# Patient Record
Sex: FEMALE | Race: WHITE | NOT HISPANIC OR LATINO | Employment: PART TIME | URBAN - METROPOLITAN AREA
[De-identification: names, ages, dates, MRNs, and addresses within clinical notes are randomized per-mention and may not be internally consistent; named-entity substitution may affect disease eponyms.]

---

## 2017-03-08 ENCOUNTER — ALLSCRIPTS OFFICE VISIT (OUTPATIENT)
Dept: OTHER | Facility: OTHER | Age: 72
End: 2017-03-08

## 2017-03-08 DIAGNOSIS — N18.30 CHRONIC KIDNEY DISEASE, STAGE III (MODERATE) (HCC): ICD-10-CM

## 2017-03-27 ENCOUNTER — GENERIC CONVERSION - ENCOUNTER (OUTPATIENT)
Dept: OTHER | Facility: OTHER | Age: 72
End: 2017-03-27

## 2017-09-27 ENCOUNTER — GENERIC CONVERSION - ENCOUNTER (OUTPATIENT)
Dept: OTHER | Facility: OTHER | Age: 72
End: 2017-09-27

## 2017-11-21 ENCOUNTER — ALLSCRIPTS OFFICE VISIT (OUTPATIENT)
Dept: OTHER | Facility: OTHER | Age: 72
End: 2017-11-21

## 2017-11-21 DIAGNOSIS — N17.9 ACUTE KIDNEY FAILURE (HCC): ICD-10-CM

## 2017-11-21 DIAGNOSIS — N18.30 CHRONIC KIDNEY DISEASE, STAGE III (MODERATE) (HCC): ICD-10-CM

## 2017-11-22 NOTE — PROGRESS NOTES
Assessment  1  PRATIMA (acute kidney injury) (584 9) (N17 9)   2  Stage III chronic kidney disease (585 3) (N18 3)   3  Diabetes mellitus, type 2 (250 00) (E11 9)   4  Hypertension (401 9) (I10)    Plan  PRATIMA (acute kidney injury)    · (1) BASIC METABOLIC PROFILE; Status:Active; Requested SZW:55PPX7623;    Perform:[facility]; FAJ:74YHN5988; Ordered; For:PRATIMA (acute kidney injury); Ordered By:Ramy Mir;  Hyperlipidemia, mixed    · Simvastatin 40 MG Oral Tablet   Rx By: Yuriy Marino; Dispense: 30 Days ; #:30 TAB; Refill: 10; For: Hyperlipidemia, mixed; GERRY = N; Sent To: [facility]/PHARMACY #5571; Last Updated By: Merissa Garza; 11/21/2017 2:00:20 PM  Hypertension    · Valsartan-Hydrochlorothiazide 320-25 MG Oral Tablet   Rx By: Yuriy Marino; Dispense: 30 Days ; #:30 TAB; Refill: 3;For: Hypertension; GERRY = N; Sent To: [facility]/PHARMACY #8663; Last Updated By: Merissa Garza; 11/21/2017 2:00:19 PM  Hypothyroidism    · Levothyroxine Sodium 150 MCG Oral Tablet   Rx By: Yuriy Marino; Dispense: 30 Days ; #:30 TAB; Refill: 5;For: Hypothyroidism; GERRY = N; Sent To: [facility]/PHARMACY #5659; Last Updated By: Merissa Garza; 11/21/2017 2:00:19 PM  Stage III chronic kidney disease    · (1) BASIC METABOLIC PROFILE; Status:Active; Requested KJF:96VSI3164;    Perform:[facility]; RYV:58NGV1888; Ordered; For:Stage III chronic kidney disease; Ordered By:Ramy Mir;   · (1) CBC/ PLT (NO DIFF); Status:Active; Requested HFW:56TQK3877;    Perform:[facility]; VDU:48BNJ3068; Ordered; For:Stage III chronic kidney disease; Ordered By:Ramy Mir;   · (1) PHOSPHORUS; Status:Active; Requested UVC:82ZQU1464;    Perform:[facility]; AKQ:36UYK1619; Ordered; For:Stage III chronic kidney disease; Ordered By:Ramy Mir;   · (1) PTH N-TERMINAL (INTACT); Status:Active; Requested FKI:73FCD8676;    Perform:[facility]; HXH:96YNC4347; Ordered;III chronic kidney disease; Ordered By:Ramy Mir;   · (1) URINALYSIS (will reflex a microscopy if leukocytes, occult blood, protein or nitrites arenot within normal limits); Status:Active; Requested QKW:83PGL4917;    Perform:[facility]; LISETTE:14FVN8761; Ordered; For:Stage III chronic kidney disease; Ordered By:Ramy Mir;   · (1) URINE PROTEIN CREATININE RATIO; Status:Active; Requested FRANCIS:31UUZ5495;    Perform:[facility]; NUS:06NZV4762; Ordered;III chronic kidney disease; Ordered By:Ramy Mir;   · Follow-up visit in 3 months Evaluation and Treatment  Follow-up  Status: Complete -Scheduling  Done: 16LOU0015 02:53PM   Ordered;Stage III chronic kidney disease; Ordered By: Jefe Ponce Performed:  Due: 74DCV6760; Last Updated By: Bassam Willoughby; 11/21/2017 2:53:29 PM  Unlinked    · Simvastatin 40 MG Oral Tablet   Dispense: 0 Days ; #: Sufficient Tablet; Refill: 0; GERRY = N; Record; Last Updated By: Jefe Ponce; 11/21/2017 2:01:15 PM    Discussion/Summary    Acute kidney injury: Kidney function is due to reacting I am not sure why  Advised to drink lots of liquid and I will repeat blood test in 1 month  stage 3: Will continue to monitor closely  Very well control  will see her back in 3 months but she will get blood tests in 1 month and in 3 months  The patient was counseled regarding diagnostic results,-- instructions for management,-- risk factor reductions,-- prognosis  The patient has the current Goals: Keep kidney function stable  The patent has the current Barriers: None  Patient is able to Self-Care  Possible side effects of new medications were reviewed with the patient/guardian today  Indication for Services: CKD Stage 3  CKD Teaching includes hypertension management, Avoid nephrotoxic medication, sodium restriction and fluid management  Reason For Visit  Eulalio Hein came in today for follow-up of stage III CKD      History of Present Illness  She is feeling quite well  Denies any complaint  Nothing much to happened since her last visit   No change in her medication      Review of Systems   Constitutional: No complaints of fever, no chills, no anorexia, no tiredness, no recent weight gain or weight loss  Integumentary: No complaints of skin rash  Gastrointestinal: No complains of abdominal pain, no constipation or diarrhea, no nausea or vomiting  Respiratory: No complaints of shortness of breath, no cough, no productive sputum  Cardiovascular: No complaints of orthopnea, no PND, no chest pain, no palpitations, no lower extremity edema  Musculoskeletal: No complaints of joint pain or swelling  Neurological: No complaints of headache, no lightheadedness or dizziness  Genitourinary: No dysuria, no hematuria, no nocturia, no urinary frequency, no incomplete emptying of bladder, no foamy urine  Eyes: No complaints of eyesight problems or dryness of eyes  ENT: no complaints of hearing loss, no nasal discharge  Psychiatric: Not suicidal, no sleep disturbance, no anxiety or depression, no change in personality, no emotional problems  ROS reviewed  Past Medical History    The active problems and past medical history were reviewed and updated today  Surgical History    The surgical history was reviewed and updated today  Current Meds   1  Aspirin Adult Low Dose 81 MG Oral Tablet Delayed Release; TAKE 1 TABLET DAILY; Therapy: (Recorded:22Jun2016) to Recorded   2  DilTIAZem HCl ER Beads 180 MG Oral Capsule Extended Release 24 Hour; TAKE 1 CAPSULE DAILY IN THE MORNING; Therapy: (Buffy [place]) to Recorded   3  Ferrous Sulfate 325 (65 Fe) MG Oral Tablet; TAKE 1 TABLET DAILY; Therapy: (Recorded:04May2016) to Recorded   4  Furosemide 20 MG Oral Tablet; TAKE 1 TABLET DAILY; Therapy: (Recorded:04May2016) to Recorded   5  GlyBURIDE 5 MG Oral Tablet; TAKE 2 TABLETS BY MOUTH IN THE AM,AND 1 IN THE PM; Therapy: 32MEK6803 to (Evaluate:21Upd7979)  Requested for: 14Apr2015; Last Rx:14Apr2015 Ordered   6   Klor-Con 10 10 MEQ Oral Tablet Extended Release; TAKE 1 TABLET DAILY WITH FOOD; Therapy: ([place] [facility]) to Recorded   7  Levo-T 175 MCG Oral Tablet; Therapy: 49HNH6826 to Recorded   8  Levothyroxine Sodium 150 MCG Oral Tablet; take 1 tablet every day; Therapy: 73TJM5782 to (Evaluate:27Jun2016)  Requested for: 69RCO0315; Last Rx:48Qyf0570; Status: ACTIVE - Renewal Denied Ordered   9  Metoprolol Succinate  MG Oral Tablet Extended Release 24 Hour; TAKE 2 TABLETS DAILY; Therapy: ([place]) to Recorded   10  Simvastatin 40 MG Oral Tablet; take 1 tablet by mouth every day; Therapy: 41ANY1219 to (Robin Stokeston)  Requested for: 77Yzy6934; Last  Rx:44Red0768; Status: ACTIVE - Renewal Denied Ordered   11  Simvastatin 40 MG Oral Tablet; TAKE 1 TABLET DAILY; Therapy: ([facility]) to Recorded   12  Triamcinolone Acetonide 0 5 % External Cream; APPLY 2-3 TIMES DAILY TO AFFECTED  AREA(S); Therapy: 22FPR4820 to (Last Rx:23Mar2015)  Requested for: 23Mar2015 Ordered   13  Valsartan-Hydrochlorothiazide 320-25 MG Oral Tablet; TAKE 1 TABLET DAILY; Therapy: ([facility]) to Recorded   14  Valsartan-Hydrochlorothiazide 320-25 MG Oral Tablet; take 1 tablet every day; Therapy: 79JNM5259 to (Evaluate:17Nov2015)  Requested for: 58FBA7300; Last  Rx:04Emh5276; Status: ACTIVE - Renewal Denied Ordered    The medication list was reviewed and updated today  Allergies  1  Augmentin   2  bacitracin   3  clindamycin  4  Seasonal    Vitals  Vital Signs    Recorded: 21Nov2017 02:04PM Recorded: 21Nov2017 01:54PM   Temperature  97 9 F, Oral   Heart Rate 80, Apical    Pulse Quality Normal, Apical    Respiration Quality Normal    Respiration 16    Systolic 739, LUE, Sitting    Diastolic 80, LUE, Sitting    Height  5 ft 4 in   Weight  247 lb 4 oz   BMI Calculated  42 44   BSA Calculated  2 14       Physical Exam   Constitutional: General appearance: No acute distress, well appearing and well nourished  Eyes: Anicteric sclerae      Neck: No bruit heard over either carotid  JVD:  No JVD present  Pulmonary: Respiratory effort: No increased work of breathing or signs of respiratory distress  -- Auscultation of lungs: Clear to auscultation  Cardiovascular: Auscultation of heart: Normal rate and rhythm, normal S1 and S2, without murmurs  Abdomen: Non-tender, no masses  Extremities: No cyanosis, clubbing or edema  Pulses: Dorsalis Pedis and Posterior Tibial pulses normal   Rash: No rash present  Neurologic: Non Focal     Psychiatric: Orientation to person, place, and time: Normal        Health Management  Other screening mammogram   Digital Bilateral Screening Mammogram With CAD; every 1 year; Next Due: 03CNX5789; Overdue  Screening for diabetic retinopathy   *VB - Eye Exam; every 1 year; Next Due: 40GCE6517; Overdue    Future Appointments    Date/Time Provider Specialty Site   02/26/2018 10:15 AM ARPIT Quarles   Nephrology [address]       Signatures   Electronically signed by : ARPIT Gonsalves ; Nov 21 2017  3:58PM EST                       (Author)

## 2018-01-09 NOTE — MISCELLANEOUS
Message   Recorded as Task   Date: 04/27/2016 04:55 PM, Created By: Marquis Tamez   Task Name: Call Back   Assigned To: Aiyana Messina   Regarding Patient: Mikaela Richter, Status: In Progress   Comment:    Freida Jackson - 27 Apr 2016 4:55 PM     TASK CREATED  Caller: Self; [phone] (Home); [phone] (Work)  SHARON CALLED AND SAID HER DERMATOLOGIST SAID SHE WANTS TO PRESCRIBED FEXOFENADINE BUT THAT YOU HAVE TO DECIDE WHAT DOSAGE  Ramy Mir - 27 Apr 2016 5:39 PM     TASK REASSIGNED: Previously Assigned To Adeola,Ramy  no dose adjusment   Aiyana Messina - 28 Apr 2016 8:38 AM     TASK IN PROGRESS   Aiyana Messina - 28 Apr 2016 8:39 AM     TASK EDITED  Left message for the patient to call back  Rocco Solothal had called and said her Dermatologist said she wants to prescribe fexofenadine but that you have to decide  Per Dr Jas Lucas no dose adjustment  Spoke with the patient and she is aware  Kaiden Whipple      Active Problems    1  Anemia (285 9) (D64 9)   2  Chronic kidney disease (CKD) (585 9) (N18 9)   3  Diabetes mellitus, type 2 (250 00) (E11 9)   4  Eczema (692 9) (L30 9)   5  Hyperlipidemia, mixed (272 2) (E78 2)   6  Hypertension (401 9) (I10)   7  Hypothyroidism (244 9) (E03 9)   8  Obesity (278 00) (E66 9)   9  Osteoarthritis (715 90) (M19 90)   10  Other screening mammogram (V76 12) (Z12 31)   11  Screening for diabetic retinopathy (V80 2) (Z13 5)    Current Meds   1  Adult Aspirin Low Strength TBDP Recorded   2  Allopurinol 300 MG Oral Tablet; take 1 tablet by mouth daily; Therapy: 14XQC3433 to (Chante Holt)  Requested for: 95ZBP7179; Last   Rx:78Klz8415; Status: ACTIVE - Renewal Denied Ordered   3  Diltiazem HCl ER Coated Beads 180 MG Oral Capsule Extended Release 24 Hour; take   1 capsule daily in the morning; Therapy: 03PUC6252 to (Evaluate:18Mar2016)  Requested for: 28Mar2016; Last   Rx:75Ded6833; Status: ACTIVE - Renewal Denied Ordered   4   Furosemide 40 MG Oral Tablet; TAKE 1 TABLET DAILY; Therapy: 89Ang8326 to (Evaluate:16Jan2016)  Requested for: 08Iyp4598; Last   Rx:77Xyr3192; Status: ACTIVE - Renewal Denied Ordered   5  GlyBURIDE 5 MG Oral Tablet; TAKE 2 TABLETS BY MOUTH IN THE AM,AND 1 IN THE   PM;   Therapy: 98TWS4461 to (Evaluate:10Dec2015)  Requested for: 14Apr2015; Last   Rx:14Apr2015 Ordered   6  Klor-Con 10 10 MEQ Oral Tablet Extended Release; TAKE 1 TABLET DAILY; Therapy: 01Mxq5821 to (Evaluate:16Jan2016)  Requested for: 83Lyy3517; Last   Rx:74Pun5434; Status: ACTIVE - Renewal Denied Ordered   7  Klor-Con M10 10 MEQ Oral Tablet Extended Release; TAKE 1 TABLET DAILY    Requested for: 64FBW1082; Last Rx:26Jan2015; Status: ACTIVE - Renewal Denied   Ordered   8  Levothyroxine Sodium 150 MCG Oral Tablet; take 1 tablet every day; Therapy: 21XSI7329 to (Evaluate:27Jun2016)  Requested for: 39EZU4643; Last   Rx:89Ztp6625 Ordered   9  Metoprolol Succinate  MG Oral Tablet Extended Release 24 Hour; TAKE 2   TABLETS EVERY DAY; Therapy: 84CRC3383 to (Hunter Apple)  Requested for: 28Apr2016; Last   Rx:37Qso8201; Status: ACTIVE - Renewal Denied Ordered   10  Simvastatin 40 MG Oral Tablet; take 1 tablet by mouth every day; Therapy: 34EVU4770 to (Hunter Apple)  Requested for: 99Ibi8578; Last    Rx:14Apr2015; Status: ACTIVE - Renewal Denied Ordered   11  Triamcinolone Acetonide 0 5 % External Cream; APPLY 2-3 TIMES DAILY TO    AFFECTED AREA(S); Therapy: 76IUZ2647 to (Last Rx:23Mar2015)  Requested for: 23Mar2015 Ordered   12  Valsartan-Hydrochlorothiazide 320-25 MG Oral Tablet; take 1 tablet every day; Therapy: 96ABE7000 to (Evaluate:17Nov2015)  Requested for: 96HSL2202; Last    Rx:73Rze3411; Status: ACTIVE - Renewal Denied Ordered    Allergies    1   Augmentin   2  clindamycin    Signatures   Electronically signed by : ARPIT Madera ; Apr 29 2016  7:35PM EST                       (Author)

## 2018-01-12 NOTE — PROGRESS NOTES
Assessment    1  Stage III chronic kidney disease (585 3) (N18 3)   2  Diabetes mellitus, type 2 (250 00) (E11 9)   3  Hyperkalemia (276 7) (E87 5)    Plan  Hyperkalemia    · (1) BASIC METABOLIC PROFILE; Status:Hold For - Exact Date; Requested for:Before next  appointment;    Perform:[facility]; VEF:89RYM9491; Ordered;  For:Hyperkalemia; Ordered By:Ramy Mir;   · Follow-up visit in 6 months Evaluation and Treatment  Follow-up  Status: Complete -  Scheduling  Done: 01AET0269 02:12PM   Ordered; For: Hyperkalemia; Ordered By: Ian Lees Performed:  Due: 09COX2154; Last Updated By: Rosemary Delacruz; 3/8/2017 2:12:17 PM  Stage III chronic kidney disease    · (1) BASIC METABOLIC PROFILE; Status:Active; Requested for:08Mar2017;    Perform:[facility]; PCN:98TIU4565; Ordered; For:Stage III chronic kidney disease; Ordered By:Ramy Mir;   · (1) CBC/ PLT (NO DIFF); Status:Active; Requested for:08Mar2017;    Perform:[facility]; ZSS:51BAQ0515; Ordered; For:Stage III chronic kidney disease; Ordered By:Ramy Mir;   · (1) PHOSPHORUS; Status:Active; Requested for:08Mar2017;    Perform:[facility]; VLD:47HFG5873; Ordered; For:Stage III chronic kidney disease; Ordered By:Ramy Mir;   · (1) PTH N-TERMINAL (INTACT); Status:Active; Requested for:08Mar2017;    Perform:[facility]; QHB:59WWA6158; Ordered; For:Stage III chronic kidney disease; Ordered By:Ramy Mir;   · (1) URINALYSIS (will reflex a microscopy if leukocytes, occult blood, protein or nitrites are  not within normal limits); Status:Active; Requested for:08Mar2017;    Perform:[facility]; UZY:74VSN3794; Ordered; For:Stage III chronic kidney disease; Ordered By:Ramy Mir;   · (1) URINE PROTEIN CREATININE RATIO; Status:Active; Requested for:08Mar2017;    Perform:[facility]; CRK:34OCX9234; Ordered;   For:Stage III chronic kidney disease; Ordered By:Adeola Ramy;    Discussion/Summary    I discuss blood work with her which suggest stable kidney function and potassium is high  Advised her to stop potassium which she is taking I will recheck her blood work in 2 weeks  I will see her back in 6 month unless blood work is still abnormal  I will redo blood work in 11 month  The patient has the current Goals: Keep kidney function is stable and improvement in potassium  The patent has the current Barriers:   Possible side effects of new medications were reviewed with the patient/guardian today  The patient was counseled regarding diagnostic results, instructions for management, prognosis  She has no barriers to learning  Indication for Services: CKD Stage 3  CKD Teaching includes hypertension management, Avoid nephrotoxic medication and dietician counseling  Reason For Visit  Deo Cooper him in today for follow-up of CKD stage III      History of Present Illness  She is oral doing well  Recovering from sinusitis though she claims see is getting that again  No other acute complaint      Review of Systems    Constitutional: No complaints of fever, no chills, no anorexia, no tiredness, no recent weight gain or weight loss  Integumentary: No complaints of skin rash  Respiratory: No complaints of shortness of breath, no cough, no productive sputum  Cardiovascular: No complaints of orthopnea, no PND, no chest pain, no palpitations, no lower extremity edema  Musculoskeletal: No complaints of joint pain or swelling  Neurological: No complaints of headache, no lightheadedness or dizziness  Genitourinary: No dysuria, no hematuria, no nocturia, no urinary frequency, no incomplete emptying of bladder, no foamy urine  Eyes: No complaints of eyesight problems or dryness of eyes  ENT: no complaints of hearing loss, no nasal discharge  Psychiatric: Not suicidal, no sleep disturbance, no anxiety or depression, no change in personality, no emotional problems       ROS reviewed  Past Medical History    The active problems and past medical history were reviewed and updated today  Current Meds   1  Allopurinol 300 MG Oral Tablet; take 1 tablet by mouth daily; Therapy: 86HSQ4690 to (Jefferson Pineda)  Requested for: 86CLJ7359; Last   Rx:29Cpl8772; Status: ACTIVE - Renewal Denied Ordered   2  Aspirin Adult Low Dose 81 MG Oral Tablet Delayed Release; TAKE 1 TABLET DAILY; Therapy: (Adonica Jubilee) to Recorded   3  DiltiaZEM HCl ER Beads 180 MG Oral Capsule Extended Release 24 Hour; TAKE 1   CAPSULE DAILY IN THE MORNING; Therapy: (Adonica Jubilee) to Recorded   4  DiltiaZEM HCl ER Coated Beads 180 MG Oral Capsule Extended Release 24 Hour; take   1 capsule daily in the morning; Therapy: 57JMJ6416 to (Evaluate:18Mar2016)  Requested for: 28Mar2016; Last   Rx:20Oct2015; Status: ACTIVE - Renewal Denied Ordered   5  Ferrous Sulfate 325 (65 Fe) MG Oral Tablet; TAKE 1 TABLET DAILY; Therapy: (Recorded:11Zkj1061) to Recorded   6  Furosemide 20 MG Oral Tablet; TAKE 1 TABLET DAILY; Therapy: (Recorded:04May2016) to Recorded   7  Furosemide 40 MG Oral Tablet; TAKE 1 TABLET DAILY; Therapy: 27Gjl2662 to (Evaluate:16Jan2016)  Requested for: 18Dza0650; Last   Rx:61Qrc4312; Status: ACTIVE - Renewal Denied Ordered   8  GlyBURIDE 5 MG Oral Tablet; TAKE 2 TABLETS BY MOUTH IN THE AM,AND 1 IN THE PM;   Therapy: 57OQE2771 to (Evaluate:39Lpw3453)  Requested for: 14Apr2015; Last   Rx:14Apr2015 Ordered   9  Klor-Con 10 10 MEQ Oral Tablet Extended Release; TAKE 1 TABLET DAILY WITH FOOD; Therapy: (Adonica Jubilee) to Recorded   10  Klor-Con 10 10 MEQ Oral Tablet Extended Release; TAKE 1 TABLET DAILY; Therapy: 35Itl3563 to (Evaluate:16Jan2016)  Requested for: 63Yjm5059; Last    Rx:73Lhj4607; Status: ACTIVE - Renewal Denied Ordered   11  Klor-Con M10 10 MEQ Oral Tablet Extended Release; TAKE 1 TABLET DAILY  Requested    for: 07YXG1561;  Last Rx:26Jan2015; Status: ACTIVE - Renewal Denied Ordered   12  Levothyroxine Sodium 150 MCG Oral Tablet; take 1 tablet every day; Therapy: 13KLT7540 to (Evaluate:27Jun2016)  Requested for: 47AXH7265; Last    Rx:90Ozt0250; Status: ACTIVE - Renewal Denied Ordered   13  Metoprolol Succinate  MG Oral Tablet Extended Release 24 Hour; TAKE 2    TABLETS DAILY; Therapy: (Jagdish Ro) to Recorded   14  Metoprolol Succinate  MG Oral Tablet Extended Release 24 Hour; TAKE 2    TABLETS EVERY DAY; Therapy: 06OAX5253 to (Dhaval Eldridge)  Requested for: 28Apr2016; Last    Rx:22Adk3111; Status: ACTIVE - Renewal Denied Ordered   15  Simvastatin 40 MG Oral Tablet; take 1 tablet by mouth every day; Therapy: 13BMC5581 to (Dhaval Eldridge)  Requested for: 42Ehz5079; Last    Rx:21Mec8145; Status: ACTIVE - Renewal Denied Ordered   16  Simvastatin 40 MG Oral Tablet; TAKE 1 TABLET DAILY; Therapy: (Jagdish Ro) to Recorded   17  Triamcinolone Acetonide 0 5 % External Cream; APPLY 2-3 TIMES DAILY TO AFFECTED    AREA(S); Therapy: 30FEU6822 to (Last Rx:23Mar2015)  Requested for: 23Mar2015 Ordered   18  Valsartan-Hydrochlorothiazide 320-25 MG Oral Tablet; TAKE 1 TABLET DAILY; Therapy: (Jagdish Ro) to Recorded   19  Valsartan-Hydrochlorothiazide 320-25 MG Oral Tablet; take 1 tablet every day; Therapy: 01WFD3177 to (Evaluate:17Nov2015)  Requested for: 04OEA1237; Last    Rx:28Sgi0937; Status: ACTIVE - Renewal Denied Ordered    The medication list was reviewed and updated today  Allergies    1  Augmentin   2  bacitracin   3  clindamycin    4   Seasonal    Vitals  Vital Signs    Recorded: 99STP7989 01:49PM Recorded: 20BRO7140 01:40PM   Temperature  97 6 F, Oral   Heart Rate 80, Apical    Pulse Quality Normal, Apical    Respiration Quality Normal    Respiration 16    Systolic 753, LUE, Sitting    Diastolic 60, LUE, Sitting    Height  5 ft 4 5 in   Weight  250 lb    BMI Calculated  42 25   BSA Calculated  2 16     Physical Exam    Constitutional: General appearance: Abnormal   obese  ENT: External ears and nose appear normal      Eyes: Anicteric sclerae  Neck: No bruit heard over either carotid  Pulmonary: Respiratory effort: No increased work of breathing or signs of respiratory distress  Auscultation of lungs: Clear to auscultation  Cardiovascular: Auscultation of heart: Normal rate and rhythm, normal S1 and S2, without murmurs  Abdomen: Non-tender, no masses  Extremities: No cyanosis, clubbing or edema  Neurologic: Non Focal      Psychiatric: Orientation to person, place, and time: Normal   and Mood and affect: Normal        Results/Data  Nephrology Flowsheet 18FJT2304 12:00AM Danish Florentino     Test Name Result Flag Reference   WBC 7 6     Hemoglobin 11 7     Hematocrit 34 8     Platelets 486     Sodium 143     Potassium 5 4     Chloride 105     Carbon Dioxide 21     Calcium 9 5     GLUCOSE 162     BUN 34     Serum Creatinine 162     GFR,  48     GFR, NON-AFRICAN AMERICAN 42     Phosphorus 4 3     PTH 30     Urine Protein Creatinine Ratio 121     Urinalysis Date 2/28/2017     SPECIFIC GRAVITY UA 1 013     BLOOD UA NEG     PH UA 6 0     PROTEIN UA NEG     NITRITE UA NEG     LEUKOCYTE ESTERASE UA 1+ A    WBC, Urine 6-10 A    RBC 0-2     BACTERIA FEW     Glucose, Urine NEG         Health Management  Other screening mammogram   Digital Bilateral Screening Mammogram With CAD; every 1 year; Next Due: 22LQJ6838; Overdue  Screening for diabetic retinopathy   *VB - Eye Exam; every 1 year; Next Due: 73AGS0198; Overdue    Future Appointments    Date/Time Provider Specialty Site   09/20/2017 01:30 PM [facility] Garrett   Nephrology [address]     Signatures   Electronically signed by : Lazarus Hooker, M D ; Mar  8 2017  4:38PM EST                       (Author)

## 2018-01-13 VITALS
HEART RATE: 80 BPM | BODY MASS INDEX: 41.65 KG/M2 | RESPIRATION RATE: 16 BRPM | TEMPERATURE: 97.6 F | DIASTOLIC BLOOD PRESSURE: 60 MMHG | WEIGHT: 250 LBS | HEIGHT: 65 IN | SYSTOLIC BLOOD PRESSURE: 120 MMHG

## 2018-01-14 VITALS
DIASTOLIC BLOOD PRESSURE: 80 MMHG | BODY MASS INDEX: 42.21 KG/M2 | WEIGHT: 247.25 LBS | SYSTOLIC BLOOD PRESSURE: 120 MMHG | HEIGHT: 64 IN | TEMPERATURE: 97.9 F | HEART RATE: 80 BPM | RESPIRATION RATE: 16 BRPM

## 2018-02-16 RX ORDER — TRIAMCINOLONE ACETONIDE 5 MG/G
CREAM TOPICAL 3 TIMES DAILY
COMMUNITY
Start: 2014-10-14 | End: 2018-02-26 | Stop reason: CLARIF

## 2018-02-16 RX ORDER — DILTIAZEM HYDROCHLORIDE 180 MG/1
360 CAPSULE, EXTENDED RELEASE ORAL DAILY
COMMUNITY

## 2018-02-16 RX ORDER — FUROSEMIDE 20 MG/1
1 TABLET ORAL DAILY
COMMUNITY
End: 2019-10-07 | Stop reason: ALTCHOICE

## 2018-02-16 RX ORDER — LEVOTHYROXINE SODIUM 175 UG/1
150 TABLET ORAL
COMMUNITY
Start: 2017-11-21

## 2018-02-16 RX ORDER — POTASSIUM CHLORIDE 750 MG/1
1 TABLET, FILM COATED, EXTENDED RELEASE ORAL DAILY
COMMUNITY
End: 2018-02-26 | Stop reason: CLARIF

## 2018-02-16 RX ORDER — VALSARTAN AND HYDROCHLOROTHIAZIDE 320; 25 MG/1; MG/1
1 TABLET, FILM COATED ORAL DAILY
COMMUNITY
End: 2018-10-03 | Stop reason: SDUPTHER

## 2018-02-16 RX ORDER — FERROUS SULFATE 325(65) MG
1 TABLET ORAL DAILY
COMMUNITY
End: 2018-10-03 | Stop reason: ALTCHOICE

## 2018-02-16 RX ORDER — ASPIRIN 81 MG/1
1 TABLET ORAL DAILY
COMMUNITY
End: 2019-10-07 | Stop reason: ALTCHOICE

## 2018-02-16 RX ORDER — GLYBURIDE 5 MG/1
TABLET ORAL 2 TIMES DAILY WITH MEALS
COMMUNITY
Start: 2015-03-24 | End: 2018-10-03 | Stop reason: ALTCHOICE

## 2018-02-16 RX ORDER — METOPROLOL SUCCINATE 100 MG/1
1 TABLET, EXTENDED RELEASE ORAL DAILY
COMMUNITY

## 2018-02-20 LAB
APPEARANCE UR: CLEAR
BACTERIA URNS QL MICRO: ABNORMAL
BILIRUB UR QL STRIP: NEGATIVE
BUN SERPL-MCNC: 30 MG/DL (ref 8–27)
BUN/CREAT SERPL: 26 (ref 12–28)
CALCIUM SERPL-MCNC: 9.6 MG/DL (ref 8.7–10.3)
CASTS URNS MICRO: ABNORMAL
CASTS URNS QL MICRO: PRESENT /LPF
CHLORIDE SERPL-SCNC: 100 MMOL/L (ref 96–106)
CO2 SERPL-SCNC: 23 MMOL/L (ref 18–29)
COLOR UR: YELLOW
CREAT SERPL-MCNC: 1.15 MG/DL (ref 0.57–1)
CREAT UR-MCNC: 44.3 MG/DL
EPI CELLS #/AREA URNS HPF: ABNORMAL /HPF
ERYTHROCYTE [DISTWIDTH] IN BLOOD BY AUTOMATED COUNT: 13.8 % (ref 12.3–15.4)
GLUCOSE SERPL-MCNC: 148 MG/DL (ref 65–99)
GLUCOSE UR QL: NEGATIVE
HCT VFR BLD AUTO: 32.7 % (ref 34–46.6)
HGB BLD-MCNC: 10.9 G/DL (ref 11.1–15.9)
HGB UR QL STRIP: NEGATIVE
KETONES UR QL STRIP: NEGATIVE
LEUKOCYTE ESTERASE UR QL STRIP: ABNORMAL
MCH RBC QN AUTO: 29.3 PG (ref 26.6–33)
MCHC RBC AUTO-ENTMCNC: 33.3 G/DL (ref 31.5–35.7)
MCV RBC AUTO: 88 FL (ref 79–97)
MICRO URNS: ABNORMAL
MUCOUS THREADS URNS QL MICRO: PRESENT
NITRITE UR QL STRIP: NEGATIVE
PH UR STRIP: 5.5 [PH] (ref 5–7.5)
PHOSPHATE SERPL-MCNC: 3.1 MG/DL (ref 2.5–4.5)
PLATELET # BLD AUTO: 289 X10E3/UL (ref 150–379)
POTASSIUM SERPL-SCNC: 4.9 MMOL/L (ref 3.5–5.2)
PROT UR QL STRIP: NEGATIVE
PROT UR-MCNC: 5.7 MG/DL
PROT/CREAT UR: 129 MG/G{CREAT} (ref 0–200)
PTH-INTACT SERPL-MCNC: 28 PG/ML (ref 15–65)
RBC # BLD AUTO: 3.72 X10E6/UL (ref 3.77–5.28)
RBC #/AREA URNS HPF: ABNORMAL /HPF
SL AMB EGFR AFRICAN AMERICAN: 55
SL AMB EGFR NON AFRICAN AMERICAN: 48
SODIUM SERPL-SCNC: 141 MMOL/L (ref 134–144)
SP GR UR: 1.01 (ref 1–1.03)
UROBILINOGEN UR STRIP-ACNC: 0.2 EU/DL (ref 0.2–1)
WBC # BLD AUTO: 6.9 X10E3/UL (ref 3.4–10.8)
WBC #/AREA URNS HPF: ABNORMAL /HPF

## 2018-02-26 ENCOUNTER — OFFICE VISIT (OUTPATIENT)
Dept: NEPHROLOGY | Facility: CLINIC | Age: 73
End: 2018-02-26
Payer: COMMERCIAL

## 2018-02-26 VITALS
TEMPERATURE: 98.2 F | HEART RATE: 78 BPM | BODY MASS INDEX: 38.41 KG/M2 | SYSTOLIC BLOOD PRESSURE: 130 MMHG | DIASTOLIC BLOOD PRESSURE: 70 MMHG | HEIGHT: 66 IN | WEIGHT: 239 LBS

## 2018-02-26 DIAGNOSIS — E11.22 TYPE 2 DIABETES MELLITUS WITH STAGE 3 CHRONIC KIDNEY DISEASE, WITHOUT LONG-TERM CURRENT USE OF INSULIN (HCC): ICD-10-CM

## 2018-02-26 DIAGNOSIS — N18.30 STAGE III CHRONIC KIDNEY DISEASE (HCC): ICD-10-CM

## 2018-02-26 DIAGNOSIS — N18.30 TYPE 2 DIABETES MELLITUS WITH STAGE 3 CHRONIC KIDNEY DISEASE, WITHOUT LONG-TERM CURRENT USE OF INSULIN (HCC): ICD-10-CM

## 2018-02-26 DIAGNOSIS — N18.30 CKD (CHRONIC KIDNEY DISEASE) STAGE 3, GFR 30-59 ML/MIN (HCC): Primary | ICD-10-CM

## 2018-02-26 PROBLEM — E87.5 HYPERKALEMIA: Status: ACTIVE | Noted: 2017-03-08

## 2018-02-26 PROCEDURE — 3066F NEPHROPATHY DOC TX: CPT | Performed by: INTERNAL MEDICINE

## 2018-02-26 PROCEDURE — 99213 OFFICE O/P EST LOW 20 MIN: CPT | Performed by: INTERNAL MEDICINE

## 2018-02-26 NOTE — PATIENT INSTRUCTIONS
Chronic Kidney Disease   AMBULATORY CARE:   Chronic kidney disease (CKD)  is the gradual and permanent loss of kidney function  Normally, the kidneys remove fluid, chemicals, and waste from your blood  These wastes are turned into urine by your kidneys  CKD may worsen over time and lead to kidney failure  Common symptoms include the following:   · Changes in how often you need to urinate    · Swelling in your arms, legs, or feet    · Shortness of breath    · Fatigue or weakness    · Bad or bitter taste in your mouth    · Nausea, vomiting, or loss of appetite  Seek care immediately if:   · You are confused and very drowsy  · You have a seizure  · You have shortness of breath  Contact your healthcare provider if:   · You suddenly gain or lose more weight than your healthcare provider has told you is okay  · You have itchy skin or a rash  · You urinate more or less than you normally do  · You have blood in your urine  · You have nausea and repeated vomiting  · You have fatigue or muscle weakness  · You have hiccups that will not stop  · You have questions or concerns about your condition or care  Treatment for CKD:  Medicines may be given to decrease blood pressure and get rid of extra fluid  You may also receive medicine to manage health conditions that may occur with CKD  Dialysis is a treatment to remove chemicals and waste from your blood when your kidneys can no longer do this  Surgery may be needed to create an arteriovenous fistula (AVF) in your arm or insert a catheter into your abdomen so that you can receive dialysis  A kidney transplant may be done if your CKD becomes severe  Manage CKD:   · Maintain a healthy weight  Ask your healthcare provider how much you should weigh  Ask him to help you create a weight loss plan if you are overweight  · Exercise 30 to 60 minutes a day, 4 to 7 times a week, or as directed  Ask about the best exercise plan for you   Regular exercise can help you manage CKD, high blood pressure, and diabetes  · Follow your healthcare provider's advice about what to eat and drink  He may tell you to eat food low in sodium (salt), potassium, phosphorus, or protein  You may need to see a dietitian if you need help planning meals  Ask how much liquid to drink each day and which liquids are best for you  · Limit alcohol  Ask how much alcohol is safe for you to drink  A drink of alcohol is 12 ounces of beer, 5 ounces of wine, or 1½ ounces of liquor  · Do not smoke  Nicotine and other chemicals in cigarettes and cigars can cause lung and kidney damage  Ask your healthcare provider for information if you currently smoke and need help to quit  E-cigarettes or smokeless tobacco still contain nicotine  Talk to your healthcare provider before you use these products  · Ask your healthcare provider if you need vaccines  Infections such as pneumonia, influenza, and hepatitis can be more harmful or more likely to occur in a person who has CKD  Vaccines reduce your risk of infection with these viruses  Follow up with your healthcare provider as directed:  Write down your questions so you remember to ask them during your visits  © 2017 [employer] Information is for End User's use only and may not be sold, redistributed or otherwise used for commercial purposes  All illustrations and images included in CareNotes® are the copyrighted property of [employer]  or [employer]  The above information is an  only  It is not intended as medical advice for individual conditions or treatments  Talk to your doctor, nurse or pharmacist before following any medical regimen to see if it is safe and effective for you

## 2018-02-26 NOTE — ASSESSMENT & PLAN NOTE
Kidney function is stable at this point  Advised to continue what she is doing with losing weight  Asymptomatic and progressive nature of kidney disease discussed with her with advised to avoid any nephrotoxic medicine    Hydration was also discussed with her

## 2018-02-26 NOTE — LETTER
February 26, 2018     [address]  [facility]    Patient: Aung Victoria   YOB: 1945   Date of Visit: 2/26/2018       Dear Dr [facility]: Thank you for referring Jean Pierre Sahni to me for evaluation  Below are my notes for this consultation  If you have questions, please do not hesitate to call me  I look forward to following your patient along with you  Sincerely,        Peace Gambino MD        CC: No Recipients  Peace Gambino MD  2/26/2018 11:56 AM  Sign at close encounter  Uriah Long 72 y o  female MRN: [id number]    Encounter: [id number] 2/26/2018    REASON FOR VISIT: Aung Victoria is a 72 y o  female who is here on 2/26/2018 for No chief complaint on file  Griffith Organ HPI:    Adore Castillo came for follow-up of stage III CKD  Feeling quite well  Denies any complaint  Losing some weight with diet and exercise and quite happy about it        REVIEW OF SYSTEMS:    Review of Systems   Constitutional: Negative for activity change and fatigue  HENT: Negative for congestion and ear discharge  Eyes: Negative for photophobia and pain  Respiratory: Negative for apnea and choking  Cardiovascular: Negative for chest pain and palpitations  Gastrointestinal: Negative for abdominal distention and blood in stool  Endocrine: Negative for heat intolerance and polyphagia  Genitourinary: Negative for flank pain and urgency  Musculoskeletal: Negative for neck pain and neck stiffness  Skin: Negative for color change and wound  Allergic/Immunologic: Negative for food allergies and immunocompromised state  Neurological: Negative for seizures and facial asymmetry  Hematological: Negative for adenopathy  Does not bruise/bleed easily  Psychiatric/Behavioral: Negative for self-injury and suicidal ideas           PAST MEDICAL HISTORY:  Past Medical History:   Diagnosis Date    Anemia     Chronic kidney disease     Diabetes mellitus ([facility] [address] )     Hypertension        PAST SURGICAL HISTORY:  Past Surgical History:   Procedure Laterality Date    GALLBLADDER SURGERY      HERNIA REPAIR      REPLACEMENT TOTAL KNEE      TONSILLECTOMY         SOCIAL HISTORY:  History   Alcohol Use No     History   Drug Use No     History   Smoking Status    Former Smoker   Smokeless Tobacco    Never Used       FAMILY HISTORY:  Family History   Problem Relation Age of Onset    No Known Problems Mother     Heart disease Father     No Known Problems Sister        MEDICATIONS:    Current Outpatient Prescriptions:     aspirin (ASPIRIN ADULT LOW DOSE) 81 mg EC tablet, Take 1 tablet by mouth daily, Disp: , Rfl:     diltiazem (TIAZAC) 180 MG 24 hr capsule, Take 1 capsule by mouth Daily, Disp: , Rfl:     ferrous sulfate 325 (65 Fe) mg tablet, Take 1 tablet by mouth daily, Disp: , Rfl:     furosemide (LASIX) 20 mg tablet, Take 1 tablet by mouth daily, Disp: , Rfl:     glyBURIDE (DIABETA) 5 mg tablet, Take by mouth 2 (two) times a day with meals  , Disp: , Rfl:     levothyroxine (LEVO-T) 175 mcg tablet, Take by mouth, Disp: , Rfl:     metoprolol succinate (TOPROL-XL) 100 mg 24 hr tablet, Take 2 tablets by mouth daily, Disp: , Rfl:     valsartan-hydrochlorothiazide (DIOVAN-HCT) 320-25 MG per tablet, Take 1 tablet by mouth daily, Disp: , Rfl:     PHYSICAL EXAM:  Vitals:    02/26/18 1000   BP: 130/70   BP Location: Right arm   Patient Position: Sitting   Pulse: 78   Temp: 98 2 °F (36 8 °C)   Weight: 108 kg (239 lb)   Height: 5' 6" (1 676 m)     Body mass index is 38 58 kg/m²  Physical Exam   Constitutional: She is oriented to person, place, and time  She appears well-developed  No distress  HENT:   Head: Normocephalic  Mouth/Throat: Oropharynx is clear and moist    Eyes: Conjunctivae are normal  No scleral icterus  Neck: Neck supple  No JVD present  Cardiovascular: Normal rate and normal heart sounds      Pulmonary/Chest: Effort normal  She has no wheezes  Abdominal: Soft  There is no tenderness  Musculoskeletal: Normal range of motion  She exhibits edema  Neurological: She is alert and oriented to person, place, and time  Skin: Skin is warm  No rash noted  Psychiatric: She has a normal mood and affect  Her behavior is normal        LAB RESULTS:  Results for orders placed or performed in visit on 02/19/18   Urinalysis with microscopic   Result Value Ref Range    Specific Gravity 1 012 1 005 - 1 030    Ph 5 5 5 0 - 7 5    SL AMB COLOR, URINE Yellow Yellow    Urine Appearance Clear Clear    SL AMB LEUKOCYTE ESTERASE URINE Trace (A) Negative    Protein Negative Negative/Trace    Glucose, Urine Negative Negative    SL AMB KETONE, URINE, QUAL  Negative Negative    SL AMB BLOOD, URINE Negative Negative    SL AMB BILIRUBIN, URINE Negative Negative    Urobilinogen Urine 0 2 0 2 - 1 0 EU/dL    SL AMB NITRITES URINE, QUAL  Negative Negative    Microscopic Examination See below:    Microscopic Examination   Result Value Ref Range    SL AMB WBC, URINE 6-10 (A) 0 - 5 /hpf    SL AMB RBC, URINE 0-2 0 - 2 /hpf    Epithelial Cells (non renal) 0-10 0 - 10 /hpf    Casts Present (A) None seen /lpf    Cast Type Hyaline casts N/A    Mucus Threads Present Not Estab      Bacteria, Urine None seen None seen/Few   CBC   Result Value Ref Range    SL AMB LAB WHITE BLOOD CELL COUNT 6 9 3 4 - 10 8 x10E3/uL    SL AMB LAB RED BLOOD CELLS 3 72 (L) 3 77 - 5 28 x10E6/uL    Hemoglobin 10 9 (L) 11 1 - 15 9 g/dL    Hematocrit 32 7 (L) 34 0 - 46 6 %    MCV 88 79 - 97 fL    MCH 29 3 26 6 - 33 0 pg    MCHC 33 3 31 5 - 35 7 g/dL    RDW 13 8 12 3 - 15 4 %    Platelet Count 560 588 - 379 [id number]   Basic metabolic panel   Result Value Ref Range    SL AMB GLUCOSE 148 (H) 65 - 99 mg/dL    BUN 30 (H) 8 - 27 mg/dL    Creatinine, Serum 1 15 (H) 0 57 - 1 00 mg/dL    eGFR Non  48 (L) >59    SL AMB EGFR  55 (L) >59    SL AMB BUN/CREATININE RATIO 26 12 - 28    SL AMB SODIUM 141 134 - 144 mmol/L    SL AMB POTASSIUM 4 9 3 5 - 5 2 mmol/L    SL AMB CHLORIDE 100 96 - 106 mmol/L    SL AMB CARBON DIOXIDE 23 18 - 29 mmol/L    CALCIUM 9 6 8 7 - 10 3 mg/dL   Protein / creatinine ratio, urine   Result Value Ref Range    Creatinine, Urine 44 3 Not Estab  mg/dL    SL AMB TOTAL PROTEIN, URINE 5 7 Not Estab  mg/dL    Prot/Creat Ratio, Ur 129 0 - 200   Phosphorus   Result Value Ref Range    Phosphorus, Serum 3 1 2 5 - 4 5 mg/dL   PTH, intact   Result Value Ref Range    PTH, Intact 28 15 - 65 pg/mL       ASSESSMENT and PLAN:      Stage III chronic kidney disease  Kidney function is stable at this point  Advised to continue what she is doing with losing weight  Asymptomatic and progressive nature of kidney disease discussed with her with advised to avoid any nephrotoxic medicine  Hydration was also discussed with her    Diabetes mellitus, type 2 ([facility] [address] )  Diabetes is reasonably well controlled and being monitored by primary doctor      I will see her back in 6 months  Will get blood and urine test before that visit        Portions of the record may have been created with voice recognition software  Occasional wrong word or "sound a like" substitutions may have occurred due to the inherent limitations of voice recognition software  Read the chart carefully and recognize, using context, where substitutions have occurred  If you have any questions, please contact the dictating provider

## 2018-02-26 NOTE — PROGRESS NOTES
NEPHROLOGY OFFICE FOLLOW UP  Ese Ramirez 72 y o  female MRN: [id number]    Encounter: [id number] 2/26/2018    REASON FOR VISIT: Ese Ramirez is a 72 y o  female who is here on 2/26/2018 for No chief complaint on file  Lajean Cassette HPI:    Dara Addison came for follow-up of stage III CKD  Feeling quite well  Denies any complaint  Losing some weight with diet and exercise and quite happy about it        REVIEW OF SYSTEMS:    Review of Systems   Constitutional: Negative for activity change and fatigue  HENT: Negative for congestion and ear discharge  Eyes: Negative for photophobia and pain  Respiratory: Negative for apnea and choking  Cardiovascular: Negative for chest pain and palpitations  Gastrointestinal: Negative for abdominal distention and blood in stool  Endocrine: Negative for heat intolerance and polyphagia  Genitourinary: Negative for flank pain and urgency  Musculoskeletal: Negative for neck pain and neck stiffness  Skin: Negative for color change and wound  Allergic/Immunologic: Negative for food allergies and immunocompromised state  Neurological: Negative for seizures and facial asymmetry  Hematological: Negative for adenopathy  Does not bruise/bleed easily  Psychiatric/Behavioral: Negative for self-injury and suicidal ideas           PAST MEDICAL HISTORY:  Past Medical History:   Diagnosis Date    Anemia     Chronic kidney disease     Diabetes mellitus ([address] )     Hypertension        PAST SURGICAL HISTORY:  Past Surgical History:   Procedure Laterality Date    GALLBLADDER SURGERY      HERNIA REPAIR      REPLACEMENT TOTAL KNEE      TONSILLECTOMY         SOCIAL HISTORY:  History   Alcohol Use No     History   Drug Use No     History   Smoking Status    Former Smoker   Smokeless Tobacco    Never Used       FAMILY HISTORY:  Family History   Problem Relation Age of Onset    No Known Problems Mother     Heart disease Father     No Known Problems Sister MEDICATIONS:    Current Outpatient Prescriptions:     aspirin (ASPIRIN ADULT LOW DOSE) 81 mg EC tablet, Take 1 tablet by mouth daily, Disp: , Rfl:     diltiazem (TIAZAC) 180 MG 24 hr capsule, Take 1 capsule by mouth Daily, Disp: , Rfl:     ferrous sulfate 325 (65 Fe) mg tablet, Take 1 tablet by mouth daily, Disp: , Rfl:     furosemide (LASIX) 20 mg tablet, Take 1 tablet by mouth daily, Disp: , Rfl:     glyBURIDE (DIABETA) 5 mg tablet, Take by mouth 2 (two) times a day with meals  , Disp: , Rfl:     levothyroxine (LEVO-T) 175 mcg tablet, Take by mouth, Disp: , Rfl:     metoprolol succinate (TOPROL-XL) 100 mg 24 hr tablet, Take 2 tablets by mouth daily, Disp: , Rfl:     valsartan-hydrochlorothiazide (DIOVAN-HCT) 320-25 MG per tablet, Take 1 tablet by mouth daily, Disp: , Rfl:     PHYSICAL EXAM:  Vitals:    02/26/18 1000   BP: 130/70   BP Location: Right arm   Patient Position: Sitting   Pulse: 78   Temp: 98 2 °F (36 8 °C)   Weight: 108 kg (239 lb)   Height: 5' 6" (1 676 m)     Body mass index is 38 58 kg/m²  Physical Exam   Constitutional: She is oriented to person, place, and time  She appears well-developed  No distress  HENT:   Head: Normocephalic  Mouth/Throat: Oropharynx is clear and moist    Eyes: Conjunctivae are normal  No scleral icterus  Neck: Neck supple  No JVD present  Cardiovascular: Normal rate and normal heart sounds  Pulmonary/Chest: Effort normal  She has no wheezes  Abdominal: Soft  There is no tenderness  Musculoskeletal: Normal range of motion  She exhibits edema  Neurological: She is alert and oriented to person, place, and time  Skin: Skin is warm  No rash noted  Psychiatric: She has a normal mood and affect   Her behavior is normal        LAB RESULTS:  Results for orders placed or performed in visit on 02/19/18   Urinalysis with microscopic   Result Value Ref Range    Specific Gravity 1 012 1 005 - 1 030    Ph 5 5 5 0 - 7 5    SL AMB COLOR, URINE Yellow Yellow    Urine Appearance Clear Clear    SL AMB LEUKOCYTE ESTERASE URINE Trace (A) Negative    Protein Negative Negative/Trace    Glucose, Urine Negative Negative    SL AMB KETONE, URINE, QUAL  Negative Negative    SL AMB BLOOD, URINE Negative Negative    SL AMB BILIRUBIN, URINE Negative Negative    Urobilinogen Urine 0 2 0 2 - 1 0 EU/dL    SL AMB NITRITES URINE, QUAL  Negative Negative    Microscopic Examination See below:    Microscopic Examination   Result Value Ref Range    SL AMB WBC, URINE 6-10 (A) 0 - 5 /hpf    SL AMB RBC, URINE 0-2 0 - 2 /hpf    Epithelial Cells (non renal) 0-10 0 - 10 /hpf    Casts Present (A) None seen /lpf    Cast Type Hyaline casts N/A    Mucus Threads Present Not Estab      Bacteria, Urine None seen None seen/Few   CBC   Result Value Ref Range    SL AMB LAB WHITE BLOOD CELL COUNT 6 9 3 4 - 10 8 x10E3/uL    SL AMB LAB RED BLOOD CELLS 3 72 (L) 3 77 - 5 28 x10E6/uL    Hemoglobin 10 9 (L) 11 1 - 15 9 g/dL    Hematocrit 32 7 (L) 34 0 - 46 6 %    MCV 88 79 - 97 fL    MCH 29 3 26 6 - 33 0 pg    MCHC 33 3 31 5 - 35 7 g/dL    RDW 13 8 12 3 - 15 4 %    Platelet Count 690 440 - 379 [id number]   Basic metabolic panel   Result Value Ref Range    SL AMB GLUCOSE 148 (H) 65 - 99 mg/dL    BUN 30 (H) 8 - 27 mg/dL    Creatinine, Serum 1 15 (H) 0 57 - 1 00 mg/dL    eGFR Non  48 (L) >59    SL AMB EGFR  55 (L) >59    SL AMB BUN/CREATININE RATIO 26 12 - 28    SL AMB SODIUM 141 134 - 144 mmol/L    SL AMB POTASSIUM 4 9 3 5 - 5 2 mmol/L    SL AMB CHLORIDE 100 96 - 106 mmol/L    SL AMB CARBON DIOXIDE 23 18 - 29 mmol/L    CALCIUM 9 6 8 7 - 10 3 mg/dL   Protein / creatinine ratio, urine   Result Value Ref Range    Creatinine, Urine 44 3 Not Estab  mg/dL    SL AMB TOTAL PROTEIN, URINE 5 7 Not Estab  mg/dL    Prot/Creat Ratio, Ur 129 0 - 200   Phosphorus   Result Value Ref Range    Phosphorus, Serum 3 1 2 5 - 4 5 mg/dL   PTH, intact   Result Value Ref Range    PTH, Intact 28 15 - 65 pg/mL       ASSESSMENT and PLAN:      Stage III chronic kidney disease  Kidney function is stable at this point  Advised to continue what she is doing with losing weight  Asymptomatic and progressive nature of kidney disease discussed with her with advised to avoid any nephrotoxic medicine  Hydration was also discussed with her    Diabetes mellitus, type 2 ([facility] [address] )  Diabetes is reasonably well controlled and being monitored by primary doctor      I will see her back in 6 months  Will get blood and urine test before that visit        Portions of the record may have been created with voice recognition software  Occasional wrong word or "sound a like" substitutions may have occurred due to the inherent limitations of voice recognition software  Read the chart carefully and recognize, using context, where substitutions have occurred  If you have any questions, please contact the dictating provider

## 2018-09-28 LAB
APPEARANCE UR: CLEAR
BACTERIA URNS QL MICRO: ABNORMAL
BASOPHILS # BLD AUTO: 0 X10E3/UL (ref 0–0.2)
BASOPHILS NFR BLD AUTO: 0 %
BILIRUB UR QL STRIP: NEGATIVE
BUN SERPL-MCNC: 41 MG/DL (ref 8–27)
BUN/CREAT SERPL: 28 (ref 12–28)
CALCIUM SERPL-MCNC: 9.7 MG/DL (ref 8.7–10.3)
CASTS URNS MICRO: ABNORMAL
CASTS URNS QL MICRO: PRESENT /LPF
CHLORIDE SERPL-SCNC: 100 MMOL/L (ref 96–106)
CO2 SERPL-SCNC: 23 MMOL/L (ref 20–29)
COLOR UR: YELLOW
CREAT SERPL-MCNC: 1.45 MG/DL (ref 0.57–1)
CREAT UR-MCNC: 34.9 MG/DL
EOSINOPHIL # BLD AUTO: 0.3 X10E3/UL (ref 0–0.4)
EOSINOPHIL NFR BLD AUTO: 5 %
EPI CELLS #/AREA URNS HPF: ABNORMAL /HPF
ERYTHROCYTE [DISTWIDTH] IN BLOOD BY AUTOMATED COUNT: 13.9 % (ref 12.3–15.4)
GLUCOSE SERPL-MCNC: 181 MG/DL (ref 65–99)
GLUCOSE UR QL: NEGATIVE
HCT VFR BLD AUTO: 34.6 % (ref 34–46.6)
HGB BLD-MCNC: 11.5 G/DL (ref 11.1–15.9)
HGB UR QL STRIP: NEGATIVE
IMM GRANULOCYTES # BLD: 0 X10E3/UL (ref 0–0.1)
IMM GRANULOCYTES NFR BLD: 0 %
KETONES UR QL STRIP: NEGATIVE
LEUKOCYTE ESTERASE UR QL STRIP: ABNORMAL
LYMPHOCYTES # BLD AUTO: 1.8 X10E3/UL (ref 0.7–3.1)
LYMPHOCYTES NFR BLD AUTO: 26 %
MCH RBC QN AUTO: 29.5 PG (ref 26.6–33)
MCHC RBC AUTO-ENTMCNC: 33.2 G/DL (ref 31.5–35.7)
MCV RBC AUTO: 89 FL (ref 79–97)
MICRO URNS: ABNORMAL
MONOCYTES # BLD AUTO: 0.5 X10E3/UL (ref 0.1–0.9)
MONOCYTES NFR BLD AUTO: 7 %
MUCOUS THREADS URNS QL MICRO: PRESENT
NEUTROPHILS # BLD AUTO: 4.1 X10E3/UL (ref 1.4–7)
NEUTROPHILS NFR BLD AUTO: 62 %
NITRITE UR QL STRIP: NEGATIVE
PH UR STRIP: 6 [PH] (ref 5–7.5)
PHOSPHATE SERPL-MCNC: 3.4 MG/DL (ref 2.5–4.5)
PLATELET # BLD AUTO: 239 X10E3/UL (ref 150–379)
POTASSIUM SERPL-SCNC: 4.9 MMOL/L (ref 3.5–5.2)
PROT UR QL STRIP: NEGATIVE
PROT UR-MCNC: 7.8 MG/DL
PROT/CREAT UR: 223 MG/G CREAT (ref 0–200)
PTH-INTACT SERPL-MCNC: 37 PG/ML (ref 15–65)
RBC # BLD AUTO: 3.9 X10E6/UL (ref 3.77–5.28)
RBC #/AREA URNS HPF: ABNORMAL /HPF
SL AMB EGFR AFRICAN AMERICAN: 42 ML/MIN/1.73
SL AMB EGFR NON AFRICAN AMERICAN: 36 ML/MIN/1.73
SODIUM SERPL-SCNC: 139 MMOL/L (ref 134–144)
SP GR UR: 1.01 (ref 1–1.03)
UROBILINOGEN UR STRIP-ACNC: 0.2 EU/DL (ref 0.2–1)
WBC # BLD AUTO: 6.7 X10E3/UL (ref 3.4–10.8)
WBC #/AREA URNS HPF: ABNORMAL /HPF

## 2018-10-01 LAB
BUN SERPL-MCNC: 41 MG/DL (ref 5–25)
CREAT ?TM UR-SCNC: 34.9 UMOL/L
CREAT SERPL-MCNC: 1.45 MG/DL (ref 0.6–1.3)
EXT BILIRUBIN, UA: ABNORMAL
EXT BLOOD, UA: ABNORMAL
EXT COLOR, UA: YELLOW
EXT GLUCOSE BLD: 181
EXT GLUCOSE, UA: ABNORMAL
EXT KETONES: ABNORMAL
EXT NITRITE, UA: ABNORMAL
EXT PH, UA: 6
EXT PROTEIN URINE: 7.8
EXT PROTEIN, UA: ABNORMAL
EXT SPECIFIC GRAVITY, UA: 1.01
EXT UROBILINOGEN: 0.2
EXTERNAL BACTERIA (UA): ABNORMAL
EXTERNAL CALCIUM: 9.7
EXTERNAL CASTS (UA): PRESENT
EXTERNAL CHLORIDE: 100
EXTERNAL CO2: 23
EXTERNAL EGFR: 36
EXTERNAL PHOSPHORUS: 3.4
EXTERNAL POTASSIUM: 4.9
EXTERNAL PTH: 37
EXTERNAL RBC (UA): ABNORMAL
EXTERNAL SODIUM: 139
EXTERNAL WBC (UA): ABNORMAL
HCT VFR BLD AUTO: 34.6 % (ref 34.8–46.1)
HGB BLD-MCNC: 11.5 G/DL (ref 11.5–15.4)
PLATELET # BLD AUTO: 239 THOUSANDS/UL (ref 149–390)
PROT/CREAT UR: 223 MG/G{CREAT}
WBC # BLD AUTO: 6.7 THOUSAND/UL
WBC # BLD EST: ABNORMAL 10*3/UL

## 2018-10-03 ENCOUNTER — OFFICE VISIT (OUTPATIENT)
Dept: NEPHROLOGY | Facility: CLINIC | Age: 73
End: 2018-10-03
Payer: COMMERCIAL

## 2018-10-03 VITALS
WEIGHT: 246 LBS | HEART RATE: 80 BPM | HEIGHT: 66 IN | RESPIRATION RATE: 16 BRPM | SYSTOLIC BLOOD PRESSURE: 100 MMHG | TEMPERATURE: 98 F | BODY MASS INDEX: 39.53 KG/M2 | DIASTOLIC BLOOD PRESSURE: 70 MMHG

## 2018-10-03 DIAGNOSIS — E11.22 TYPE 2 DIABETES MELLITUS WITH STAGE 3 CHRONIC KIDNEY DISEASE, WITHOUT LONG-TERM CURRENT USE OF INSULIN (HCC): ICD-10-CM

## 2018-10-03 DIAGNOSIS — N18.30 TYPE 2 DIABETES MELLITUS WITH STAGE 3 CHRONIC KIDNEY DISEASE, WITHOUT LONG-TERM CURRENT USE OF INSULIN (HCC): ICD-10-CM

## 2018-10-03 DIAGNOSIS — N18.30 STAGE III CHRONIC KIDNEY DISEASE (HCC): Primary | ICD-10-CM

## 2018-10-03 DIAGNOSIS — I10 ESSENTIAL HYPERTENSION: ICD-10-CM

## 2018-10-03 PROCEDURE — 99213 OFFICE O/P EST LOW 20 MIN: CPT | Performed by: INTERNAL MEDICINE

## 2018-10-03 RX ORDER — LOSARTAN POTASSIUM AND HYDROCHLOROTHIAZIDE 12.5; 5 MG/1; MG/1
1 TABLET ORAL DAILY
Qty: 30 TABLET | Refills: 3 | Status: SHIPPED | OUTPATIENT
Start: 2018-10-03 | End: 2019-02-14 | Stop reason: SDUPTHER

## 2018-10-03 RX ORDER — LINAGLIPTIN 5 MG/1
5 TABLET, FILM COATED ORAL DAILY
Refills: 3 | Status: ON HOLD | COMMUNITY
Start: 2018-07-12 | End: 2019-11-13 | Stop reason: ALTCHOICE

## 2018-10-03 NOTE — PATIENT INSTRUCTIONS
Chronic Kidney Disease   AMBULATORY CARE:   Chronic kidney disease (CKD)  is the gradual and permanent loss of kidney function  Normally, the kidneys remove fluid, chemicals, and waste from your blood  These wastes are turned into urine by your kidneys  CKD may worsen over time and lead to kidney failure  Common symptoms include the following:   · Changes in how often you need to urinate    · Swelling in your arms, legs, or feet    · Shortness of breath    · Fatigue or weakness    · Bad or bitter taste in your mouth    · Nausea, vomiting, or loss of appetite  Seek care immediately if:   · You are confused and very drowsy  · You have a seizure  · You have shortness of breath  Contact your healthcare provider if:   · You suddenly gain or lose more weight than your healthcare provider has told you is okay  · You have itchy skin or a rash  · You urinate more or less than you normally do  · You have blood in your urine  · You have nausea and repeated vomiting  · You have fatigue or muscle weakness  · You have hiccups that will not stop  · You have questions or concerns about your condition or care  Treatment for CKD:  Medicines may be given to decrease blood pressure and get rid of extra fluid  You may also receive medicine to manage health conditions that may occur with CKD  Dialysis is a treatment to remove chemicals and waste from your blood when your kidneys can no longer do this  Surgery may be needed to create an arteriovenous fistula (AVF) in your arm or insert a catheter into your abdomen so that you can receive dialysis  A kidney transplant may be done if your CKD becomes severe  Manage CKD:   · Maintain a healthy weight  Ask your healthcare provider how much you should weigh  Ask him to help you create a weight loss plan if you are overweight  · Exercise 30 to 60 minutes a day, 4 to 7 times a week, or as directed  Ask about the best exercise plan for you   Regular exercise can help you manage CKD, high blood pressure, and diabetes  · Follow your healthcare provider's advice about what to eat and drink  He may tell you to eat food low in sodium (salt), potassium, phosphorus, or protein  You may need to see a dietitian if you need help planning meals  Ask how much liquid to drink each day and which liquids are best for you  · Limit alcohol  Ask how much alcohol is safe for you to drink  A drink of alcohol is 12 ounces of beer, 5 ounces of wine, or 1½ ounces of liquor  · Do not smoke  Nicotine and other chemicals in cigarettes and cigars can cause lung and kidney damage  Ask your healthcare provider for information if you currently smoke and need help to quit  E-cigarettes or smokeless tobacco still contain nicotine  Talk to your healthcare provider before you use these products  · Ask your healthcare provider if you need vaccines  Infections such as pneumonia, influenza, and hepatitis can be more harmful or more likely to occur in a person who has CKD  Vaccines reduce your risk of infection with these viruses  Follow up with your healthcare provider as directed:  Write down your questions so you remember to ask them during your visits  © 2017 [employer] Information is for End User's use only and may not be sold, redistributed or otherwise used for commercial purposes  All illustrations and images included in CareNotes® are the copyrighted property of [employer]  or [employer]  The above information is an  only  It is not intended as medical advice for individual conditions or treatments  Talk to your doctor, nurse or pharmacist before following any medical regimen to see if it is safe and effective for you

## 2018-10-03 NOTE — LETTER
October 3, 2018     Shana Garza DO  [address] [place] [address]    Patient: Marycarmen Fofana   YOB: 1945   Date of Visit: 10/3/2018       Dear Dr Ginger Martínez: Thank you for referring Chapo Danette to me for evaluation  Below are my notes for this consultation  If you have questions, please do not hesitate to call me  I look forward to following your patient along with you  Sincerely,        Flavio Cm MD        CC: No Recipients  Flavio Cm MD  10/3/2018  4:39 PM  Sign at close encounter  [address] 72 y o  female MRN: [id number]    Encounter: [id number] 10/3/2018    REASON FOR VISIT: Marycarmen Fofana is a 72 y o  female who is here on 10/3/2018 for Follow-up and CKD III       HPI:    Jes Ramon came in today for follow-up of CKD stage 3  She is overall doing well  Denies any complaint no chest pain no palpitation no shortness of breath no breathing problem still taking same medication        REVIEW OF SYSTEMS:    Review of Systems   Constitutional: Negative for activity change, chills, fatigue and unexpected weight change  HENT: Negative for congestion, ear discharge, sinus pain and sinus pressure  Eyes: Negative for photophobia and pain  Respiratory: Negative for apnea, cough, choking, chest tightness and shortness of breath  Cardiovascular: Negative for chest pain, palpitations and leg swelling  Gastrointestinal: Negative for abdominal distention, abdominal pain, anal bleeding, blood in stool, constipation and diarrhea  Endocrine: Negative for heat intolerance, polyphagia and polyuria  Genitourinary: Negative for dysuria, flank pain, genital sores and urgency  Musculoskeletal: Positive for arthralgias and back pain  Negative for neck pain and neck stiffness  Skin: Negative for color change and wound  Allergic/Immunologic: Negative for food allergies and immunocompromised state     Neurological: Negative for seizures, facial asymmetry and weakness  Hematological: Negative for adenopathy  Does not bruise/bleed easily  Psychiatric/Behavioral: Negative for self-injury and suicidal ideas  PAST MEDICAL HISTORY:  Past Medical History:   Diagnosis Date    Anemia     Chronic kidney disease     Diabetes mellitus ([address] )     Hypertension        PAST SURGICAL HISTORY:  Past Surgical History:   Procedure Laterality Date    CATARACT EXTRACTION, BILATERAL      GALLBLADDER SURGERY      HERNIA REPAIR      REPLACEMENT TOTAL KNEE      TONSILLECTOMY         SOCIAL HISTORY:  History   Alcohol Use No     History   Drug Use No     History   Smoking Status    Former Smoker   Smokeless Tobacco    Never Used       FAMILY HISTORY:  Family History   Problem Relation Age of Onset    No Known Problems Mother     Heart disease Father     No Known Problems Sister        MEDICATIONS:    Current Outpatient Prescriptions:     aspirin (ASPIRIN ADULT LOW DOSE) 81 mg EC tablet, Take 1 tablet by mouth daily, Disp: , Rfl:     diltiazem (TIAZAC) 180 MG 24 hr capsule, Take 1 capsule by mouth Daily, Disp: , Rfl:     furosemide (LASIX) 20 mg tablet, Take 1 tablet by mouth daily, Disp: , Rfl:     levothyroxine (LEVO-T) 175 mcg tablet, Take by mouth, Disp: , Rfl:     losartan-hydrochlorothiazide (HYZAAR) 50-12 5 mg per tablet, Take 1 tablet by mouth daily, Disp: 30 tablet, Rfl: 3    metoprolol succinate (TOPROL-XL) 100 mg 24 hr tablet, Take 2 tablets by mouth daily, Disp: , Rfl:     TRADJENTA 5 MG TABS, Take 5 mg by mouth daily, Disp: , Rfl: 3    PHYSICAL EXAM:  Vitals:    10/03/18 1509   BP: 100/70   BP Location: Right arm   Patient Position: Sitting   Pulse: 80   Resp: 16   Temp: 98 °F (36 7 °C)   TempSrc: Tympanic   Weight: 112 kg (246 lb)   Height: 5' 6" (1 676 m)     Body mass index is 39 71 kg/m²  Physical Exam   Constitutional: She is oriented to person, place, and time  She appears well-developed  No distress     HENT: Head: Normocephalic and atraumatic  Mouth/Throat: Oropharynx is clear and moist    Eyes: Pupils are equal, round, and reactive to light  Conjunctivae and EOM are normal  No scleral icterus  Neck: Normal range of motion  Neck supple  No JVD present  Cardiovascular: Normal rate, regular rhythm and normal heart sounds  No murmur heard  Pulmonary/Chest: Effort normal and breath sounds normal  No respiratory distress  She has no wheezes  She has no rales  Abdominal: Soft  Bowel sounds are normal  There is no tenderness  Musculoskeletal: Normal range of motion  She exhibits edema  Neurological: She is alert and oriented to person, place, and time  Skin: Skin is warm  No rash noted  Psychiatric: She has a normal mood and affect   Her behavior is normal        LAB RESULTS:  Results for orders placed or performed in visit on 10/01/18   CBC   Result Value Ref Range    WBC 6 70 Thousand/uL    Hemoglobin 11 5 11 5 - 15 4 g/dL    Hematocrit 34 6 (A) 34 8 - 46 1 %    Platelets 893 127 - 747 Thousands/uL   Urinalysis with microscopic   Result Value Ref Range    EXTERNAL COLOR,UA yellow     EXT Spec Grav, UA (Ref:1 003-1 030) 1 013     EXT Glucose, UA (Ref: Negative) neg     Ketones, UA (Ref: Negative) neg     Blood, UA neg     Nitrite, UA (Ref: Negative) neg      Leukocytes, UA (Ref: Negative) 1+     EXT pH, UA (Ref: 4 5-8 0) 6 0     EXT Protein, UA (Ref: Negative) neg     EXT Bilirubin, UA (Ref: Negative) neg     Urobilinogen, UA (Ref: 0 2- 1 0) 0 2     RBC, UA 0-2     EXTERNAL WBC, UA 6-10     EXTERNAL BACTERIA, UA few     EXTERNAL CASTS, UA present    Basic metabolic panel   Result Value Ref Range    SODIUM 139     POTASSIUM 4 9     CHLORIDE 100     CO2 23     BUN 41 (A) 5 - 25 mg/dL    Creatinine 1 45 (A) 0 60 - 1 30 mg/dL    Glucose 181     EXTERNAL CALCIUM 9 7     GFR MDRD Af Amer 42 ml/min/1 73sq m    EXTERNAL EGFR 36    Protein / creatinine ratio, urine   Result Value Ref Range    PROTEIN UA 7 8 EXT Creatinine Urine 34 9     EXTERNAL Ur Prot/Creat Ratio 223    Phosphorus   Result Value Ref Range    EXTERNAL PHOSPHORUS 3 4    PTH, intact   Result Value Ref Range    EXTERNAL PTH 37        ASSESSMENT and PLAN:      Stage III chronic kidney disease  Kidney function is deteriorating  Blood pressure is running low  I will reduce dose of valsartan from 320 actually I will change medicine to losartan 50 of leg 12 5 once a day  Advised to monitor blood pressure at home I will repeat blood test in 2 months and I will see her back in 3 months    Hypertension  Blood pressure is actually low so I will reduce the dose of medication    Diabetes mellitus, type 2 (HCC)  No results found for: HGBA1C    No results for input(s): POCGLU in the last 72 hours  Blood Sugar Average: Last 72 hrs:    Do not have any recent hemoglobin A1c  Advised to monitor glucose at home  Discussed with her about importance of diabetic control and kidney disease      I will see her back in 3 months        Portions of the record may have been created with voice recognition software  Occasional wrong word or "sound a like" substitutions may have occurred due to the inherent limitations of voice recognition software  Read the chart carefully and recognize, using context, where substitutions have occurred  If you have any questions, please contact the dictating provider

## 2018-10-03 NOTE — ASSESSMENT & PLAN NOTE
No results found for: HGBA1C    No results for input(s): POCGLU in the last 72 hours  Blood Sugar Average: Last 72 hrs:    Do not have any recent hemoglobin A1c  Advised to monitor glucose at home    Discussed with her about importance of diabetic control and kidney disease

## 2018-10-03 NOTE — PROGRESS NOTES
NEPHROLOGY OFFICE FOLLOW UP  Jordon Maurice 72 y o  female MRN: [id number]    Encounter: [id number] 10/3/2018    REASON FOR VISIT: Jordon Maurice is a 72 y o  female who is here on 10/3/2018 for Follow-up and CKD III       HPI:    Valentin Notice came in today for follow-up of CKD stage 3  She is overall doing well  Denies any complaint no chest pain no palpitation no shortness of breath no breathing problem still taking same medication        REVIEW OF SYSTEMS:    Review of Systems   Constitutional: Negative for activity change, chills, fatigue and unexpected weight change  HENT: Negative for congestion, ear discharge, sinus pain and sinus pressure  Eyes: Negative for photophobia and pain  Respiratory: Negative for apnea, cough, choking, chest tightness and shortness of breath  Cardiovascular: Negative for chest pain, palpitations and leg swelling  Gastrointestinal: Negative for abdominal distention, abdominal pain, anal bleeding, blood in stool, constipation and diarrhea  Endocrine: Negative for heat intolerance, polyphagia and polyuria  Genitourinary: Negative for dysuria, flank pain, genital sores and urgency  Musculoskeletal: Positive for arthralgias and back pain  Negative for neck pain and neck stiffness  Skin: Negative for color change and wound  Allergic/Immunologic: Negative for food allergies and immunocompromised state  Neurological: Negative for seizures, facial asymmetry and weakness  Hematological: Negative for adenopathy  Does not bruise/bleed easily  Psychiatric/Behavioral: Negative for self-injury and suicidal ideas           PAST MEDICAL HISTORY:  Past Medical History:   Diagnosis Date    Anemia     Chronic kidney disease     Diabetes mellitus ([address] )     Hypertension        PAST SURGICAL HISTORY:  Past Surgical History:   Procedure Laterality Date    CATARACT EXTRACTION, BILATERAL      GALLBLADDER SURGERY      HERNIA REPAIR      REPLACEMENT TOTAL KNEE      TONSILLECTOMY         SOCIAL HISTORY:  History   Alcohol Use No     History   Drug Use No     History   Smoking Status    Former Smoker   Smokeless Tobacco    Never Used       FAMILY HISTORY:  Family History   Problem Relation Age of Onset    No Known Problems Mother     Heart disease Father     No Known Problems Sister        MEDICATIONS:    Current Outpatient Prescriptions:     aspirin (ASPIRIN ADULT LOW DOSE) 81 mg EC tablet, Take 1 tablet by mouth daily, Disp: , Rfl:     diltiazem (TIAZAC) 180 MG 24 hr capsule, Take 1 capsule by mouth Daily, Disp: , Rfl:     furosemide (LASIX) 20 mg tablet, Take 1 tablet by mouth daily, Disp: , Rfl:     levothyroxine (LEVO-T) 175 mcg tablet, Take by mouth, Disp: , Rfl:     losartan-hydrochlorothiazide (HYZAAR) 50-12 5 mg per tablet, Take 1 tablet by mouth daily, Disp: 30 tablet, Rfl: 3    metoprolol succinate (TOPROL-XL) 100 mg 24 hr tablet, Take 2 tablets by mouth daily, Disp: , Rfl:     TRADJENTA 5 MG TABS, Take 5 mg by mouth daily, Disp: , Rfl: 3    PHYSICAL EXAM:  Vitals:    10/03/18 1509   BP: 100/70   BP Location: Right arm   Patient Position: Sitting   Pulse: 80   Resp: 16   Temp: 98 °F (36 7 °C)   TempSrc: Tympanic   Weight: 112 kg (246 lb)   Height: 5' 6" (1 676 m)     Body mass index is 39 71 kg/m²  Physical Exam   Constitutional: She is oriented to person, place, and time  She appears well-developed  No distress  HENT:   Head: Normocephalic and atraumatic  Mouth/Throat: Oropharynx is clear and moist    Eyes: Pupils are equal, round, and reactive to light  Conjunctivae and EOM are normal  No scleral icterus  Neck: Normal range of motion  Neck supple  No JVD present  Cardiovascular: Normal rate, regular rhythm and normal heart sounds  No murmur heard  Pulmonary/Chest: Effort normal and breath sounds normal  No respiratory distress  She has no wheezes  She has no rales  Abdominal: Soft  Bowel sounds are normal  There is no tenderness  Musculoskeletal: Normal range of motion  She exhibits edema  Neurological: She is alert and oriented to person, place, and time  Skin: Skin is warm  No rash noted  Psychiatric: She has a normal mood and affect  Her behavior is normal        LAB RESULTS:  Results for orders placed or performed in visit on 10/01/18   CBC   Result Value Ref Range    WBC 6 70 Thousand/uL    Hemoglobin 11 5 11 5 - 15 4 g/dL    Hematocrit 34 6 (A) 34 8 - 46 1 %    Platelets 548 691 - 805 Thousands/uL   Urinalysis with microscopic   Result Value Ref Range    EXTERNAL COLOR,UA yellow     EXT Spec Grav, UA (Ref:1 003-1 030) 1 013     EXT Glucose, UA (Ref: Negative) neg     Ketones, UA (Ref: Negative) neg     Blood, UA neg     Nitrite, UA (Ref: Negative) neg      Leukocytes, UA (Ref: Negative) 1+     EXT pH, UA (Ref: 4 5-8 0) 6 0     EXT Protein, UA (Ref: Negative) neg     EXT Bilirubin, UA (Ref: Negative) neg     Urobilinogen, UA (Ref: 0 2- 1 0) 0 2     RBC, UA 0-2     EXTERNAL WBC, UA 6-10     EXTERNAL BACTERIA, UA few     EXTERNAL CASTS, UA present    Basic metabolic panel   Result Value Ref Range    SODIUM 139     POTASSIUM 4 9     CHLORIDE 100     CO2 23     BUN 41 (A) 5 - 25 mg/dL    Creatinine 1 45 (A) 0 60 - 1 30 mg/dL    Glucose 181     EXTERNAL CALCIUM 9 7     GFR MDRD Af Amer 42 ml/min/1 73sq m    EXTERNAL EGFR 36    Protein / creatinine ratio, urine   Result Value Ref Range    PROTEIN UA 7 8     EXT Creatinine Urine 34 9     EXTERNAL Ur Prot/Creat Ratio 223    Phosphorus   Result Value Ref Range    EXTERNAL PHOSPHORUS 3 4    PTH, intact   Result Value Ref Range    EXTERNAL PTH 37        ASSESSMENT and PLAN:      Stage III chronic kidney disease  Kidney function is deteriorating  Blood pressure is running low  I will reduce dose of valsartan from 320 actually I will change medicine to losartan 50 of leg 12 5 once a day    Advised to monitor blood pressure at home I will repeat blood test in 2 months and I will see her back in 3 months    Hypertension  Blood pressure is actually low so I will reduce the dose of medication    Diabetes mellitus, type 2 (HCC)  No results found for: HGBA1C    No results for input(s): POCGLU in the last 72 hours  Blood Sugar Average: Last 72 hrs:    Do not have any recent hemoglobin A1c  Advised to monitor glucose at home  Discussed with her about importance of diabetic control and kidney disease      I will see her back in 3 months        Portions of the record may have been created with voice recognition software  Occasional wrong word or "sound a like" substitutions may have occurred due to the inherent limitations of voice recognition software  Read the chart carefully and recognize, using context, where substitutions have occurred  If you have any questions, please contact the dictating provider

## 2018-10-03 NOTE — ASSESSMENT & PLAN NOTE
Kidney function is deteriorating  Blood pressure is running low  I will reduce dose of valsartan from 320 actually I will change medicine to losartan 50 of leg 12 5 once a day    Advised to monitor blood pressure at home I will repeat blood test in 2 months and I will see her back in 3 months

## 2019-02-05 ENCOUNTER — TELEPHONE (OUTPATIENT)
Dept: NEPHROLOGY | Facility: CLINIC | Age: 74
End: 2019-02-05

## 2019-02-05 NOTE — TELEPHONE ENCOUNTER
A message has been left asking pt to contact the office to inquire if any blood work was completed for an upcoming appointment with Dr Ayanna Stovall

## 2019-02-14 DIAGNOSIS — N18.30 STAGE III CHRONIC KIDNEY DISEASE (HCC): ICD-10-CM

## 2019-02-14 RX ORDER — LOSARTAN POTASSIUM AND HYDROCHLOROTHIAZIDE 12.5; 5 MG/1; MG/1
1 TABLET ORAL DAILY
Qty: 30 TABLET | Refills: 3 | Status: SHIPPED | OUTPATIENT
Start: 2019-02-14 | End: 2019-06-20 | Stop reason: SDUPTHER

## 2019-03-27 LAB
BASOPHILS # BLD AUTO: 49 CELLS/UL (ref 0–200)
BASOPHILS NFR BLD AUTO: 0.6 %
BUN SERPL-MCNC: 50 MG/DL (ref 7–25)
BUN/CREAT SERPL: 33 (CALC) (ref 6–22)
CALCIUM SERPL-MCNC: 9.2 MG/DL (ref 8.6–10.4)
CHLORIDE SERPL-SCNC: 97 MMOL/L (ref 98–110)
CO2 SERPL-SCNC: 30 MMOL/L (ref 20–32)
CREAT SERPL-MCNC: 1.53 MG/DL (ref 0.6–0.93)
EOSINOPHIL # BLD AUTO: 8 CELLS/UL (ref 15–500)
EOSINOPHIL NFR BLD AUTO: 0.1 %
ERYTHROCYTE [DISTWIDTH] IN BLOOD BY AUTOMATED COUNT: 13.1 % (ref 11–15)
GLUCOSE SERPL-MCNC: 222 MG/DL (ref 65–99)
HCT VFR BLD AUTO: 34.9 % (ref 35–45)
HGB BLD-MCNC: 11.6 G/DL (ref 11.7–15.5)
LYMPHOCYTES # BLD AUTO: 1583 CELLS/UL (ref 850–3900)
LYMPHOCYTES NFR BLD AUTO: 19.3 %
MCH RBC QN AUTO: 29.4 PG (ref 27–33)
MCHC RBC AUTO-ENTMCNC: 33.2 G/DL (ref 32–36)
MCV RBC AUTO: 88.4 FL (ref 80–100)
MONOCYTES # BLD AUTO: 689 CELLS/UL (ref 200–950)
MONOCYTES NFR BLD AUTO: 8.4 %
NEUTROPHILS # BLD AUTO: 5871 CELLS/UL (ref 1500–7800)
NEUTROPHILS NFR BLD AUTO: 71.6 %
PLATELET # BLD AUTO: 247 THOUSAND/UL (ref 140–400)
PMV BLD REES-ECKER: 10.3 FL (ref 7.5–12.5)
POTASSIUM SERPL-SCNC: 4.4 MMOL/L (ref 3.5–5.3)
RBC # BLD AUTO: 3.95 MILLION/UL (ref 3.8–5.1)
SL AMB EGFR AFRICAN AMERICAN: 39 ML/MIN/1.73M2
SL AMB EGFR NON AFRICAN AMERICAN: 34 ML/MIN/1.73M2
SODIUM SERPL-SCNC: 136 MMOL/L (ref 135–146)
WBC # BLD AUTO: 8.2 THOUSAND/UL (ref 3.8–10.8)

## 2019-03-29 ENCOUNTER — TELEPHONE (OUTPATIENT)
Dept: NEPHROLOGY | Facility: CLINIC | Age: 74
End: 2019-03-29

## 2019-04-01 ENCOUNTER — OFFICE VISIT (OUTPATIENT)
Dept: NEPHROLOGY | Facility: CLINIC | Age: 74
End: 2019-04-01
Payer: COMMERCIAL

## 2019-04-01 VITALS
RESPIRATION RATE: 16 BRPM | SYSTOLIC BLOOD PRESSURE: 130 MMHG | HEART RATE: 80 BPM | DIASTOLIC BLOOD PRESSURE: 70 MMHG | BODY MASS INDEX: 41.69 KG/M2 | HEIGHT: 65 IN | WEIGHT: 250.2 LBS | TEMPERATURE: 97.1 F

## 2019-04-01 DIAGNOSIS — I10 BENIGN ESSENTIAL HYPERTENSION: ICD-10-CM

## 2019-04-01 DIAGNOSIS — N18.30 STAGE III CHRONIC KIDNEY DISEASE (HCC): Primary | ICD-10-CM

## 2019-04-01 DIAGNOSIS — I48.91 ATRIAL FIBRILLATION, UNSPECIFIED TYPE (HCC): ICD-10-CM

## 2019-04-01 DIAGNOSIS — N18.30 TYPE 2 DIABETES MELLITUS WITH STAGE 3 CHRONIC KIDNEY DISEASE, WITHOUT LONG-TERM CURRENT USE OF INSULIN (HCC): ICD-10-CM

## 2019-04-01 DIAGNOSIS — E11.22 TYPE 2 DIABETES MELLITUS WITH STAGE 3 CHRONIC KIDNEY DISEASE, WITHOUT LONG-TERM CURRENT USE OF INSULIN (HCC): ICD-10-CM

## 2019-04-01 PROCEDURE — 99213 OFFICE O/P EST LOW 20 MIN: CPT | Performed by: INTERNAL MEDICINE

## 2019-04-01 PROCEDURE — 3066F NEPHROPATHY DOC TX: CPT | Performed by: INTERNAL MEDICINE

## 2019-04-01 RX ORDER — ERGOCALCIFEROL (VITAMIN D2) 50 MCG
2000 CAPSULE ORAL DAILY
COMMUNITY

## 2019-06-20 DIAGNOSIS — N18.30 STAGE III CHRONIC KIDNEY DISEASE (HCC): ICD-10-CM

## 2019-06-20 RX ORDER — LOSARTAN POTASSIUM AND HYDROCHLOROTHIAZIDE 12.5; 5 MG/1; MG/1
1 TABLET ORAL DAILY
Qty: 30 TABLET | Refills: 3 | Status: SHIPPED | OUTPATIENT
Start: 2019-06-20 | End: 2019-10-07 | Stop reason: ALTCHOICE

## 2019-09-26 ENCOUNTER — TRANSCRIBE ORDERS (OUTPATIENT)
Dept: ADMINISTRATIVE | Facility: HOSPITAL | Age: 74
End: 2019-09-26

## 2019-09-26 ENCOUNTER — APPOINTMENT (OUTPATIENT)
Dept: LAB | Facility: HOSPITAL | Age: 74
End: 2019-09-26
Attending: INTERNAL MEDICINE
Payer: COMMERCIAL

## 2019-09-26 DIAGNOSIS — E11.9 DIABETES MELLITUS WITHOUT COMPLICATION (HCC): ICD-10-CM

## 2019-09-26 DIAGNOSIS — R73.9 HYPERGLYCEMIA: ICD-10-CM

## 2019-09-26 DIAGNOSIS — E03.8 TSH DEFICIENCY: ICD-10-CM

## 2019-09-26 DIAGNOSIS — R53.83 TIREDNESS: Primary | ICD-10-CM

## 2019-09-26 DIAGNOSIS — N18.30 STAGE III CHRONIC KIDNEY DISEASE (HCC): ICD-10-CM

## 2019-09-26 DIAGNOSIS — E04.9 ENLARGEMENT OF THYROID: ICD-10-CM

## 2019-09-26 DIAGNOSIS — R53.83 TIREDNESS: ICD-10-CM

## 2019-09-26 LAB
ALBUMIN SERPL BCP-MCNC: 3.3 G/DL (ref 3.5–5)
ALP SERPL-CCNC: 70 U/L (ref 46–116)
ALT SERPL W P-5'-P-CCNC: 12 U/L (ref 12–78)
ANION GAP SERPL CALCULATED.3IONS-SCNC: 11 MMOL/L (ref 4–13)
AST SERPL W P-5'-P-CCNC: 15 U/L (ref 5–45)
BACTERIA UR QL AUTO: ABNORMAL /HPF
BASOPHILS # BLD AUTO: 0.06 THOUSANDS/ΜL (ref 0–0.1)
BASOPHILS NFR BLD AUTO: 1 % (ref 0–1)
BILIRUB SERPL-MCNC: 1.5 MG/DL (ref 0.2–1)
BILIRUB UR QL STRIP: NEGATIVE
BUN SERPL-MCNC: 14 MG/DL (ref 5–25)
CALCIUM SERPL-MCNC: 9.6 MG/DL (ref 8.3–10.1)
CHLORIDE SERPL-SCNC: 100 MMOL/L (ref 100–108)
CLARITY UR: CLEAR
CO2 SERPL-SCNC: 28 MMOL/L (ref 21–32)
COLOR UR: YELLOW
CREAT SERPL-MCNC: 1.21 MG/DL (ref 0.6–1.3)
CREAT UR-MCNC: 113 MG/DL
EOSINOPHIL # BLD AUTO: 0.55 THOUSAND/ΜL (ref 0–0.61)
EOSINOPHIL NFR BLD AUTO: 7 % (ref 0–6)
ERYTHROCYTE [DISTWIDTH] IN BLOOD BY AUTOMATED COUNT: 13.8 % (ref 11.6–15.1)
EST. AVERAGE GLUCOSE BLD GHB EST-MCNC: 217 MG/DL
GFR SERPL CREATININE-BSD FRML MDRD: 45 ML/MIN/1.73SQ M
GLUCOSE P FAST SERPL-MCNC: 250 MG/DL (ref 65–99)
GLUCOSE UR STRIP-MCNC: NEGATIVE MG/DL
HBA1C MFR BLD: 9.2 % (ref 4.2–6.3)
HCT VFR BLD AUTO: 39.1 % (ref 34.8–46.1)
HGB BLD-MCNC: 12.2 G/DL (ref 11.5–15.4)
HGB UR QL STRIP.AUTO: NEGATIVE
HYALINE CASTS #/AREA URNS LPF: ABNORMAL /LPF
IMM GRANULOCYTES # BLD AUTO: 0.04 THOUSAND/UL (ref 0–0.2)
IMM GRANULOCYTES NFR BLD AUTO: 1 % (ref 0–2)
KETONES UR STRIP-MCNC: NEGATIVE MG/DL
LEUKOCYTE ESTERASE UR QL STRIP: ABNORMAL
LYMPHOCYTES # BLD AUTO: 1.75 THOUSANDS/ΜL (ref 0.6–4.47)
LYMPHOCYTES NFR BLD AUTO: 22 % (ref 14–44)
MCH RBC QN AUTO: 27.7 PG (ref 26.8–34.3)
MCHC RBC AUTO-ENTMCNC: 31.2 G/DL (ref 31.4–37.4)
MCV RBC AUTO: 89 FL (ref 82–98)
MICROALBUMIN UR-MCNC: 162 MG/L (ref 0–20)
MICROALBUMIN/CREAT 24H UR: 143 MG/G CREATININE (ref 0–30)
MONOCYTES # BLD AUTO: 0.63 THOUSAND/ΜL (ref 0.17–1.22)
MONOCYTES NFR BLD AUTO: 8 % (ref 4–12)
NEUTROPHILS # BLD AUTO: 5.02 THOUSANDS/ΜL (ref 1.85–7.62)
NEUTS SEG NFR BLD AUTO: 61 % (ref 43–75)
NITRITE UR QL STRIP: NEGATIVE
NON-SQ EPI CELLS URNS QL MICRO: ABNORMAL /HPF
NRBC BLD AUTO-RTO: 0 /100 WBCS
PH UR STRIP.AUTO: 6 [PH]
PHOSPHATE SERPL-MCNC: 3.8 MG/DL (ref 2.3–4.1)
PLATELET # BLD AUTO: 303 THOUSANDS/UL (ref 149–390)
PMV BLD AUTO: 10.7 FL (ref 8.9–12.7)
POTASSIUM SERPL-SCNC: 4.2 MMOL/L (ref 3.5–5.3)
PROT SERPL-MCNC: 7.1 G/DL (ref 6.4–8.2)
PROT UR STRIP-MCNC: ABNORMAL MG/DL
PTH-INTACT SERPL-MCNC: 26.7 PG/ML (ref 18.4–80.1)
RBC # BLD AUTO: 4.4 MILLION/UL (ref 3.81–5.12)
RBC #/AREA URNS AUTO: ABNORMAL /HPF
SODIUM SERPL-SCNC: 139 MMOL/L (ref 136–145)
SP GR UR STRIP.AUTO: 1.01 (ref 1–1.03)
T3FREE SERPL-MCNC: 2.32 PG/ML (ref 2.3–4.2)
T4 FREE SERPL-MCNC: 1.83 NG/DL (ref 0.76–1.46)
TSH SERPL DL<=0.05 MIU/L-ACNC: 0.62 UIU/ML (ref 0.36–3.74)
UROBILINOGEN UR QL STRIP.AUTO: 0.2 E.U./DL
WBC # BLD AUTO: 8.05 THOUSAND/UL (ref 4.31–10.16)
WBC #/AREA URNS AUTO: ABNORMAL /HPF

## 2019-09-26 PROCEDURE — 81001 URINALYSIS AUTO W/SCOPE: CPT

## 2019-09-26 PROCEDURE — 83970 ASSAY OF PARATHORMONE: CPT

## 2019-09-26 PROCEDURE — 80053 COMPREHEN METABOLIC PANEL: CPT

## 2019-09-26 PROCEDURE — 84100 ASSAY OF PHOSPHORUS: CPT

## 2019-09-26 PROCEDURE — 82043 UR ALBUMIN QUANTITATIVE: CPT | Performed by: GENERAL PRACTICE

## 2019-09-26 PROCEDURE — 84481 FREE ASSAY (FT-3): CPT

## 2019-09-26 PROCEDURE — 82570 ASSAY OF URINE CREATININE: CPT | Performed by: GENERAL PRACTICE

## 2019-09-26 PROCEDURE — 84439 ASSAY OF FREE THYROXINE: CPT

## 2019-09-26 PROCEDURE — 84443 ASSAY THYROID STIM HORMONE: CPT

## 2019-09-26 PROCEDURE — 85025 COMPLETE CBC W/AUTO DIFF WBC: CPT

## 2019-09-26 PROCEDURE — 83036 HEMOGLOBIN GLYCOSYLATED A1C: CPT

## 2019-09-26 PROCEDURE — 36415 COLL VENOUS BLD VENIPUNCTURE: CPT

## 2019-09-28 ENCOUNTER — APPOINTMENT (OUTPATIENT)
Dept: LAB | Facility: HOSPITAL | Age: 74
End: 2019-09-28
Attending: INTERNAL MEDICINE
Payer: COMMERCIAL

## 2019-09-28 PROCEDURE — 82570 ASSAY OF URINE CREATININE: CPT

## 2019-09-28 PROCEDURE — 84156 ASSAY OF PROTEIN URINE: CPT

## 2019-09-29 LAB
CREAT UR-MCNC: 94.5 MG/DL
PROT UR-MCNC: 27 MG/DL
PROT/CREAT UR: 0.29 MG/G{CREAT} (ref 0–0.1)

## 2019-10-07 ENCOUNTER — OFFICE VISIT (OUTPATIENT)
Dept: NEPHROLOGY | Facility: CLINIC | Age: 74
End: 2019-10-07
Payer: COMMERCIAL

## 2019-10-07 VITALS
WEIGHT: 237.4 LBS | HEIGHT: 65 IN | SYSTOLIC BLOOD PRESSURE: 120 MMHG | DIASTOLIC BLOOD PRESSURE: 80 MMHG | BODY MASS INDEX: 39.55 KG/M2 | HEART RATE: 68 BPM | RESPIRATION RATE: 16 BRPM | TEMPERATURE: 97.9 F

## 2019-10-07 DIAGNOSIS — N18.30 TYPE 2 DIABETES MELLITUS WITH STAGE 3 CHRONIC KIDNEY DISEASE, WITHOUT LONG-TERM CURRENT USE OF INSULIN (HCC): ICD-10-CM

## 2019-10-07 DIAGNOSIS — N18.9 CHRONIC KIDNEY DISEASE-MINERAL AND BONE DISORDER: ICD-10-CM

## 2019-10-07 DIAGNOSIS — E83.9 CHRONIC KIDNEY DISEASE-MINERAL AND BONE DISORDER: ICD-10-CM

## 2019-10-07 DIAGNOSIS — N18.30 STAGE III CHRONIC KIDNEY DISEASE (HCC): Primary | ICD-10-CM

## 2019-10-07 DIAGNOSIS — M89.9 CHRONIC KIDNEY DISEASE-MINERAL AND BONE DISORDER: ICD-10-CM

## 2019-10-07 DIAGNOSIS — I48.91 ATRIAL FIBRILLATION, UNSPECIFIED TYPE (HCC): ICD-10-CM

## 2019-10-07 DIAGNOSIS — I10 BENIGN ESSENTIAL HYPERTENSION: ICD-10-CM

## 2019-10-07 DIAGNOSIS — E11.22 TYPE 2 DIABETES MELLITUS WITH STAGE 3 CHRONIC KIDNEY DISEASE, WITHOUT LONG-TERM CURRENT USE OF INSULIN (HCC): ICD-10-CM

## 2019-10-07 PROCEDURE — 99214 OFFICE O/P EST MOD 30 MIN: CPT | Performed by: INTERNAL MEDICINE

## 2019-10-07 PROCEDURE — 3074F SYST BP LT 130 MM HG: CPT | Performed by: INTERNAL MEDICINE

## 2019-10-07 PROCEDURE — 3079F DIAST BP 80-89 MM HG: CPT | Performed by: INTERNAL MEDICINE

## 2019-10-07 RX ORDER — SIMVASTATIN 40 MG
TABLET ORAL
COMMUNITY

## 2019-10-07 RX ORDER — MULTIVIT WITH MINERALS/LUTEIN
1 TABLET ORAL DAILY
COMMUNITY

## 2019-10-07 RX ORDER — MONTELUKAST SODIUM 10 MG/1
10 TABLET ORAL
COMMUNITY

## 2019-10-07 NOTE — ASSESSMENT & PLAN NOTE
Lab Results   Component Value Date    HGBA1C 9 2 (H) 09/26/2019    Diabetes is not well control  She is going discussed with the primary doctor about that    She is working on it that also importance of diabetic control and kidney disease discussed with her

## 2019-10-07 NOTE — PROGRESS NOTES
NEPHROLOGY OFFICE FOLLOW UP  Tucker Lopez 69 y o  female MRN: [id number]    Encounter: [id number] 10/7/2019    REASON FOR VISIT: Tucker Lopez is a 69 y o  female who is here on 10/7/2019 for Follow-up and Chronic Kidney Disease    HPI:    Familia Hardin came in today for follow-up of stage III CKD  She is doing quite well  Since I saw her last she was hospitalized with atrial fibrillation and rapid ventricular response  She claims he forgot to take her medicine for couple of days  In hospital her ACE-inhibitor was stopped and diltiazem was increased  She is being closely monitored by cardiologist    She is feeling well denies any chest pain no palpitation no shortness of breath      REVIEW OF SYSTEMS:    Review of Systems   Constitutional: Negative for activity change and fatigue  HENT: Negative for congestion and ear discharge  Eyes: Negative for photophobia and pain  Respiratory: Negative for apnea and choking  Cardiovascular: Negative for chest pain and palpitations  Gastrointestinal: Negative for abdominal distention, abdominal pain, blood in stool and diarrhea  Endocrine: Negative for heat intolerance and polyphagia  Genitourinary: Negative for flank pain and urgency  Musculoskeletal: Negative for neck pain and neck stiffness  Skin: Negative for color change and wound  Allergic/Immunologic: Negative for food allergies and immunocompromised state  Neurological: Negative for seizures and facial asymmetry  Hematological: Negative for adenopathy  Does not bruise/bleed easily  Psychiatric/Behavioral: Negative for self-injury and suicidal ideas           PAST MEDICAL HISTORY:  Past Medical History:   Diagnosis Date    Anemia     Chronic kidney disease     Diabetes mellitus ([address] )     Hypertension        PAST SURGICAL HISTORY:  Past Surgical History:   Procedure Laterality Date    CATARACT EXTRACTION, BILATERAL      GALLBLADDER SURGERY      HERNIA REPAIR      REPLACEMENT TOTAL KNEE      TONSILLECTOMY         SOCIAL HISTORY:  Social History     Substance and Sexual Activity   Alcohol Use No     Social History     Substance and Sexual Activity   Drug Use No     Social History     Tobacco Use   Smoking Status Former Smoker   Smokeless Tobacco Never Used       FAMILY HISTORY:  Family History   Problem Relation Age of Onset    No Known Problems Mother     Heart disease Father     No Known Problems Sister        MEDICATIONS:    Current Outpatient Medications:     apixaban (ELIQUIS) 5 mg, Take 5 mg by mouth 2 (two) times a day, Disp: , Rfl:     diltiazem (TIAZAC) 180 MG 24 hr capsule, Take 360 mg by mouth Daily , Disp: , Rfl:     levothyroxine (LEVO-T) 175 mcg tablet, Take by mouth, Disp: , Rfl:     metoprolol succinate (TOPROL-XL) 100 mg 24 hr tablet, Take 2 tablets by mouth daily, Disp: , Rfl:     montelukast (SINGULAIR) 10 mg tablet, Take 10 mg by mouth daily at bedtime, Disp: , Rfl:     Multiple Vitamins-Minerals (CENTRUM SILVER) tablet, Take 1 tablet by mouth daily, Disp: , Rfl:     simvastatin (ZOCOR) 40 mg tablet, simvastatin 40 mg tablet  TAKE 1 TABLET BY MOUTH EVERY DAY, Disp: , Rfl:     tiotropium (SPIRIVA RESPIMAT) 1 25 MCG/ACT AERS inhaler, Inhale 2 puffs daily, Disp: , Rfl:     TRADJENTA 5 MG TABS, Take 5 mg by mouth daily, Disp: , Rfl: 3    Vitamin D, Ergocalciferol, 2000 units CAPS, Take 2,000 capsules by mouth daily, Disp: , Rfl:     PHYSICAL EXAM:  Vitals:    10/07/19 0859   BP: 120/80   BP Location: Right arm   Patient Position: Sitting   Pulse: 68   Resp: 16   Temp: 97 9 °F (36 6 °C)   TempSrc: Tympanic   Weight: 108 kg (237 lb 6 4 oz)   Height: 5' 4 5" (1 638 m)     Body mass index is 40 12 kg/m²  Physical Exam   Constitutional: She is oriented to person, place, and time  She appears well-developed  No distress  HENT:   Head: Normocephalic  Mouth/Throat: Oropharynx is clear and moist    Eyes: Pupils are equal, round, and reactive to light  Conjunctivae are normal  No scleral icterus  Neck: Normal range of motion  Neck supple  No JVD present  Cardiovascular: Normal rate, regular rhythm and normal heart sounds  Pulmonary/Chest: Effort normal and breath sounds normal  No respiratory distress  She has no wheezes  Abdominal: Soft  Bowel sounds are normal  There is no tenderness  Musculoskeletal: Normal range of motion  She exhibits edema  Neurological: She is alert and oriented to person, place, and time  Skin: Skin is warm  Rash noted  Psychiatric: She has a normal mood and affect   Her behavior is normal        LAB RESULTS:  Results for orders placed or performed in visit on 09/26/19   CBC and differential   Result Value Ref Range    WBC 8 05 4 31 - 10 16 Thousand/uL    RBC 4 40 3 81 - 5 12 Million/uL    Hemoglobin 12 2 11 5 - 15 4 g/dL    Hematocrit 39 1 34 8 - 46 1 %    MCV 89 82 - 98 fL    MCH 27 7 26 8 - 34 3 pg    MCHC 31 2 (L) 31 4 - 37 4 g/dL    RDW 13 8 11 6 - 15 1 %    MPV 10 7 8 9 - 12 7 fL    Platelets 996 424 - 593 Thousands/uL    nRBC 0 /100 WBCs    Neutrophils Relative 61 43 - 75 %    Immat GRANS % 1 0 - 2 %    Lymphocytes Relative 22 14 - 44 %    Monocytes Relative 8 4 - 12 %    Eosinophils Relative 7 (H) 0 - 6 %    Basophils Relative 1 0 - 1 %    Neutrophils Absolute 5 02 1 85 - 7 62 Thousands/µL    Immature Grans Absolute 0 04 0 00 - 0 20 Thousand/uL    Lymphocytes Absolute 1 75 0 60 - 4 47 Thousands/µL    Monocytes Absolute 0 63 0 17 - 1 22 Thousand/µL    Eosinophils Absolute 0 55 0 00 - 0 61 Thousand/µL    Basophils Absolute 0 06 0 00 - 0 10 Thousands/µL   Phosphorus   Result Value Ref Range    Phosphorus 3 8 2 3 - 4 1 mg/dL   Protein / creatinine ratio, urine   Result Value Ref Range    Creatinine, Ur 94 5 mg/dL    Protein Urine Random 27 mg/dL    Prot/Creat Ratio, Ur 0 29 (H) 0 00 - 0 10   Urinalysis with reflex to microscopic   Result Value Ref Range    Color, UA Yellow     Clarity, UA Clear     Specific [place], UA 1 015 1 003 - 1 030    pH, UA 6 0 4 5, 5 0, 5 5, 6 0, 6 5, 7 0, 7 5, 8 0    Leukocytes, UA Moderate (A) Negative    Nitrite, UA Negative Negative    Protein, UA 30 (1+) (A) Negative mg/dl    Glucose, UA Negative Negative mg/dl    Ketones, UA Negative Negative mg/dl    Urobilinogen, UA 0 2 0 2, 1 0 E U /dl E U /dl    Bilirubin, UA Negative Negative    Blood, UA Negative Negative   Comprehensive metabolic panel   Result Value Ref Range    Sodium 139 136 - 145 mmol/L    Potassium 4 2 3 5 - 5 3 mmol/L    Chloride 100 100 - 108 mmol/L    CO2 28 21 - 32 mmol/L    ANION GAP 11 4 - 13 mmol/L    BUN 14 5 - 25 mg/dL    Creatinine 1 21 0 60 - 1 30 mg/dL    Glucose, Fasting 250 (H) 65 - 99 mg/dL    Calcium 9 6 8 3 - 10 1 mg/dL    AST 15 5 - 45 U/L    ALT 12 12 - 78 U/L    Alkaline Phosphatase 70 46 - 116 U/L    Total Protein 7 1 6 4 - 8 2 g/dL    Albumin 3 3 (L) 3 5 - 5 0 g/dL    Total Bilirubin 1 50 (H) 0 20 - 1 00 mg/dL    eGFR 45 ml/min/1 73sq m   TSH, 3rd generation   Result Value Ref Range    TSH 3RD GENERATON 0 618 0 358 - 3 740 uIU/mL   T3, free   Result Value Ref Range    T3, Free 2 32 2 30 - 4 20 pg/mL   T4, free   Result Value Ref Range    Free T4 1 83 (H) 0 76 - 1 46 ng/dL   Hemoglobin A1C   Result Value Ref Range    Hemoglobin A1C 9 2 (H) 4 2 - 6 3 %     mg/dl   PTH, intact   Result Value Ref Range    PTH 26 7 18 4 - 80 1 pg/mL   Urine Microscopic   Result Value Ref Range    RBC, UA 1-2 (A) None Seen, 0-5 /hpf    WBC, UA 10-20 (A) None Seen, 0-5, 5-55, 5-65 /hpf    Epithelial Cells Occasional None Seen, Occasional /hpf    Bacteria, UA Moderate (A) None Seen, Occasional /hpf    Hyaline Casts, UA 0-1 (A) (none) /lpf       ASSESSMENT and PLAN:      Stage III chronic kidney disease  Her renal function is stable creatinine is 1 2 with GFR of 45  Advised hydration and avoidance of any nephrotoxic medicine    She is off ACE-inhibitor which I prefer to start her back once get clear by cardiologist      Chronic kidney disease-mineral and bone disorder  PTH and phosphorus are within normal range  Will continue to monitor as part of the CKD management    Afib ([email or web address] 75 )  Rate seems to well control  She is going to see cardiologist within couple of weeks for further management    Diabetes mellitus, type 2 ([facility] [[email or web address] 75 )    Lab Results   Component Value Date    HGBA1C 9 2 (H) 09/26/2019    Diabetes is not well control  She is going discussed with the primary doctor about that  She is working on it that also importance of diabetic control and kidney disease discussed with her    Benign essential hypertension  Very well control with medication  Again will prefer ACE-inhibitor as part of the management    She will come back to see me in 6 months  Will do blood work and urine test before that visit  Everything discussed at length with her          Portions of the record may have been created with voice recognition software  Occasional wrong word or "sound a like" substitutions may have occurred due to the inherent limitations of voice recognition software  Read the chart carefully and recognize, using context, where substitutions have occurred  If you have any questions, please contact the dictating provider

## 2019-10-07 NOTE — ASSESSMENT & PLAN NOTE
Rate seems to well control    She is going to see cardiologist within couple of weeks for further management

## 2019-10-07 NOTE — LETTER
October 7, 2019     Dedrick Paez, DO  [address] [place] [address]    Patient: Anne Pham   YOB: 1945   Date of Visit: 10/7/2019       Dear Dr Bri Little: Thank you for referring Jos Alecia to me for evaluation  Below are my notes for this consultation  If you have questions, please do not hesitate to call me  I look forward to following your patient along with you  Sincerely,        Kaley Morris MD        CC: Pamela Barros MD  10/7/2019  1:05 PM  Sign at close encounter  Uriah Long 69 y o  female MRN: [id number]    Encounter: [id number] 10/7/2019    REASON FOR VISIT: Anne Pham is a 69 y o  female who is here on 10/7/2019 for Follow-up and Chronic Kidney Disease    HPI:    Izabella Borden came in today for follow-up of stage III CKD  She is doing quite well  Since I saw her last she was hospitalized with atrial fibrillation and rapid ventricular response  She claims he forgot to take her medicine for couple of days  In hospital her ACE-inhibitor was stopped and diltiazem was increased  She is being closely monitored by cardiologist    She is feeling well denies any chest pain no palpitation no shortness of breath      REVIEW OF SYSTEMS:    Review of Systems   Constitutional: Negative for activity change and fatigue  HENT: Negative for congestion and ear discharge  Eyes: Negative for photophobia and pain  Respiratory: Negative for apnea and choking  Cardiovascular: Negative for chest pain and palpitations  Gastrointestinal: Negative for abdominal distention, abdominal pain, blood in stool and diarrhea  Endocrine: Negative for heat intolerance and polyphagia  Genitourinary: Negative for flank pain and urgency  Musculoskeletal: Negative for neck pain and neck stiffness  Skin: Negative for color change and wound     Allergic/Immunologic: Negative for food allergies and immunocompromised state    Neurological: Negative for seizures and facial asymmetry  Hematological: Negative for adenopathy  Does not bruise/bleed easily  Psychiatric/Behavioral: Negative for self-injury and suicidal ideas           PAST MEDICAL HISTORY:  Past Medical History:   Diagnosis Date    Anemia     Chronic kidney disease     Diabetes mellitus ([address] )     Hypertension        PAST SURGICAL HISTORY:  Past Surgical History:   Procedure Laterality Date    CATARACT EXTRACTION, BILATERAL      GALLBLADDER SURGERY      HERNIA REPAIR      REPLACEMENT TOTAL KNEE      TONSILLECTOMY         SOCIAL HISTORY:  Social History     Substance and Sexual Activity   Alcohol Use No     Social History     Substance and Sexual Activity   Drug Use No     Social History     Tobacco Use   Smoking Status Former Smoker   Smokeless Tobacco Never Used       FAMILY HISTORY:  Family History   Problem Relation Age of Onset    No Known Problems Mother     Heart disease Father     No Known Problems Sister        MEDICATIONS:    Current Outpatient Medications:     apixaban (ELIQUIS) 5 mg, Take 5 mg by mouth 2 (two) times a day, Disp: , Rfl:     diltiazem (TIAZAC) 180 MG 24 hr capsule, Take 360 mg by mouth Daily , Disp: , Rfl:     levothyroxine (LEVO-T) 175 mcg tablet, Take by mouth, Disp: , Rfl:     metoprolol succinate (TOPROL-XL) 100 mg 24 hr tablet, Take 2 tablets by mouth daily, Disp: , Rfl:     montelukast (SINGULAIR) 10 mg tablet, Take 10 mg by mouth daily at bedtime, Disp: , Rfl:     Multiple Vitamins-Minerals (CENTRUM SILVER) tablet, Take 1 tablet by mouth daily, Disp: , Rfl:     simvastatin (ZOCOR) 40 mg tablet, simvastatin 40 mg tablet  TAKE 1 TABLET BY MOUTH EVERY DAY, Disp: , Rfl:     tiotropium (SPIRIVA RESPIMAT) 1 25 MCG/ACT AERS inhaler, Inhale 2 puffs daily, Disp: , Rfl:     TRADJENTA 5 MG TABS, Take 5 mg by mouth daily, Disp: , Rfl: 3    Vitamin D, Ergocalciferol, 2000 units CAPS, Take 2,000 capsules by mouth daily, Disp: , Rfl:     PHYSICAL EXAM:  Vitals:    10/07/19 0859   BP: 120/80   BP Location: Right arm   Patient Position: Sitting   Pulse: 68   Resp: 16   Temp: 97 9 °F (36 6 °C)   TempSrc: Tympanic   Weight: 108 kg (237 lb 6 4 oz)   Height: 5' 4 5" (1 638 m)     Body mass index is 40 12 kg/m²  Physical Exam   Constitutional: She is oriented to person, place, and time  She appears well-developed  No distress  HENT:   Head: Normocephalic  Mouth/Throat: Oropharynx is clear and moist    Eyes: Pupils are equal, round, and reactive to light  Conjunctivae are normal  No scleral icterus  Neck: Normal range of motion  Neck supple  No JVD present  Cardiovascular: Normal rate, regular rhythm and normal heart sounds  Pulmonary/Chest: Effort normal and breath sounds normal  No respiratory distress  She has no wheezes  Abdominal: Soft  Bowel sounds are normal  There is no tenderness  Musculoskeletal: Normal range of motion  She exhibits edema  Neurological: She is alert and oriented to person, place, and time  Skin: Skin is warm  Rash noted  Psychiatric: She has a normal mood and affect   Her behavior is normal        LAB RESULTS:  Results for orders placed or performed in visit on 09/26/19   CBC and differential   Result Value Ref Range    WBC 8 05 4 31 - 10 16 Thousand/uL    RBC 4 40 3 81 - 5 12 Million/uL    Hemoglobin 12 2 11 5 - 15 4 g/dL    Hematocrit 39 1 34 8 - 46 1 %    MCV 89 82 - 98 fL    MCH 27 7 26 8 - 34 3 pg    MCHC 31 2 (L) 31 4 - 37 4 g/dL    RDW 13 8 11 6 - 15 1 %    MPV 10 7 8 9 - 12 7 fL    Platelets 219 320 - 902 Thousands/uL    nRBC 0 /100 WBCs    Neutrophils Relative 61 43 - 75 %    Immat GRANS % 1 0 - 2 %    Lymphocytes Relative 22 14 - 44 %    Monocytes Relative 8 4 - 12 %    Eosinophils Relative 7 (H) 0 - 6 %    Basophils Relative 1 0 - 1 %    Neutrophils Absolute 5 02 1 85 - 7 62 Thousands/µL    Immature Grans Absolute 0 04 0 00 - 0 20 Thousand/uL    Lymphocytes Absolute 1 75 0 60 - 4 47 Thousands/µL    Monocytes Absolute 0 63 0 17 - 1 22 Thousand/µL    Eosinophils Absolute 0 55 0 00 - 0 61 Thousand/µL    Basophils Absolute 0 06 0 00 - 0 10 Thousands/µL   Phosphorus   Result Value Ref Range    Phosphorus 3 8 2 3 - 4 1 mg/dL   Protein / creatinine ratio, urine   Result Value Ref Range    Creatinine, Ur 94 5 mg/dL    Protein Urine Random 27 mg/dL    Prot/Creat Ratio, Ur 0 29 (H) 0 00 - 0 10   Urinalysis with reflex to microscopic   Result Value Ref Range    Color, UA Yellow     Clarity, UA Clear     Specific [place], UA 1 015 1 003 - 1 030    pH, UA 6 0 4 5, 5 0, 5 5, 6 0, 6 5, 7 0, 7 5, 8 0    Leukocytes, UA Moderate (A) Negative    Nitrite, UA Negative Negative    Protein, UA 30 (1+) (A) Negative mg/dl    Glucose, UA Negative Negative mg/dl    Ketones, UA Negative Negative mg/dl    Urobilinogen, UA 0 2 0 2, 1 0 E U /dl E U /dl    Bilirubin, UA Negative Negative    Blood, UA Negative Negative   Comprehensive metabolic panel   Result Value Ref Range    Sodium 139 136 - 145 mmol/L    Potassium 4 2 3 5 - 5 3 mmol/L    Chloride 100 100 - 108 mmol/L    CO2 28 21 - 32 mmol/L    ANION GAP 11 4 - 13 mmol/L    BUN 14 5 - 25 mg/dL    Creatinine 1 21 0 60 - 1 30 mg/dL    Glucose, Fasting 250 (H) 65 - 99 mg/dL    Calcium 9 6 8 3 - 10 1 mg/dL    AST 15 5 - 45 U/L    ALT 12 12 - 78 U/L    Alkaline Phosphatase 70 46 - 116 U/L    Total Protein 7 1 6 4 - 8 2 g/dL    Albumin 3 3 (L) 3 5 - 5 0 g/dL    Total Bilirubin 1 50 (H) 0 20 - 1 00 mg/dL    eGFR 45 ml/min/1 73sq m   TSH, 3rd generation   Result Value Ref Range    TSH 3RD GENERATON 0 618 0 358 - 3 740 uIU/mL   T3, free   Result Value Ref Range    T3, Free 2 32 2 30 - 4 20 pg/mL   T4, free   Result Value Ref Range    Free T4 1 83 (H) 0 76 - 1 46 ng/dL   Hemoglobin A1C   Result Value Ref Range    Hemoglobin A1C 9 2 (H) 4 2 - 6 3 %     mg/dl   PTH, intact   Result Value Ref Range    PTH 26 7 18 4 - 80 1 pg/mL   Urine Microscopic   Result Value Ref Range RBC, UA 1-2 (A) None Seen, 0-5 /hpf    WBC, UA 10-20 (A) None Seen, 0-5, 5-55, 5-65 /hpf    Epithelial Cells Occasional None Seen, Occasional /hpf    Bacteria, UA Moderate (A) None Seen, Occasional /hpf    Hyaline Casts, UA 0-1 (A) (none) /lpf       ASSESSMENT and PLAN:      Stage III chronic kidney disease  Her renal function is stable creatinine is 1 2 with GFR of 45  Advised hydration and avoidance of any nephrotoxic medicine  She is off ACE-inhibitor which I prefer to start her back once get clear by cardiologist      Chronic kidney disease-mineral and bone disorder  PTH and phosphorus are within normal range  Will continue to monitor as part of the CKD management    Afib ([facility] )  Rate seems to well control  She is going to see cardiologist within couple of weeks for further management    Diabetes mellitus, type 2 ([facility] )    Lab Results   Component Value Date    HGBA1C 9 2 (H) 09/26/2019    Diabetes is not well control  She is going discussed with the primary doctor about that  She is working on it that also importance of diabetic control and kidney disease discussed with her    Benign essential hypertension  Very well control with medication  Again will prefer ACE-inhibitor as part of the management    She will come back to see me in 6 months  Will do blood work and urine test before that visit  Everything discussed at length with her          Portions of the record may have been created with voice recognition software  Occasional wrong word or "sound a like" substitutions may have occurred due to the inherent limitations of voice recognition software  Read the chart carefully and recognize, using context, where substitutions have occurred  If you have any questions, please contact the dictating provider

## 2019-10-07 NOTE — PATIENT INSTRUCTIONS
Chronic Kidney Disease   AMBULATORY CARE:   Chronic kidney disease (CKD)  is the gradual and permanent loss of kidney function  Normally, the kidneys remove fluid, chemicals, and waste from your blood  These wastes are turned into urine by your kidneys  CKD may worsen over time and lead to kidney failure  Common symptoms include the following:   · Changes in how often you need to urinate    · Swelling in your arms, legs, or feet    · Shortness of breath    · Fatigue or weakness    · Bad or bitter taste in your mouth    · Nausea, vomiting, or loss of appetite  Seek care immediately if:   · You are confused and very drowsy  · You have a seizure  · You have shortness of breath  Contact your healthcare provider if:   · You suddenly gain or lose more weight than your healthcare provider has told you is okay  · You have itchy skin or a rash  · You urinate more or less than you normally do  · You have blood in your urine  · You have nausea and repeated vomiting  · You have fatigue or muscle weakness  · You have hiccups that will not stop  · You have questions or concerns about your condition or care  Treatment for CKD:  Medicines may be given to decrease blood pressure and get rid of extra fluid  You may also receive medicine to manage health conditions that may occur with CKD  Dialysis is a treatment to remove chemicals and waste from your blood when your kidneys can no longer do this  Surgery may be needed to create an arteriovenous fistula (AVF) in your arm or insert a catheter into your abdomen so that you can receive dialysis  A kidney transplant may be done if your CKD becomes severe  Manage CKD:   · Maintain a healthy weight  Ask your healthcare provider how much you should weigh  Ask him to help you create a weight loss plan if you are overweight  · Exercise 30 to 60 minutes a day, 4 to 7 times a week, or as directed  Ask about the best exercise plan for you   Regular exercise can help you manage CKD, high blood pressure, and diabetes  · Follow your healthcare provider's advice about what to eat and drink  He may tell you to eat food low in sodium (salt), potassium, phosphorus, or protein  You may need to see a dietitian if you need help planning meals  Ask how much liquid to drink each day and which liquids are best for you  · Limit alcohol  Ask how much alcohol is safe for you to drink  A drink of alcohol is 12 ounces of beer, 5 ounces of wine, or 1½ ounces of liquor  · Do not smoke  Nicotine and other chemicals in cigarettes and cigars can cause lung and kidney damage  Ask your healthcare provider for information if you currently smoke and need help to quit  E-cigarettes or smokeless tobacco still contain nicotine  Talk to your healthcare provider before you use these products  · Ask your healthcare provider if you need vaccines  Infections such as pneumonia, influenza, and hepatitis can be more harmful or more likely to occur in a person who has CKD  Vaccines reduce your risk of infection with these viruses  Follow up with your healthcare provider as directed:  Write down your questions so you remember to ask them during your visits  © 2017 [employer] Information is for End User's use only and may not be sold, redistributed or otherwise used for commercial purposes  All illustrations and images included in CareNotes® are the copyrighted property of [employer]  or [employer]  The above information is an  only  It is not intended as medical advice for individual conditions or treatments  Talk to your doctor, nurse or pharmacist before following any medical regimen to see if it is safe and effective for you

## 2019-10-07 NOTE — ASSESSMENT & PLAN NOTE
Her renal function is stable creatinine is 1 2 with GFR of 45  Advised hydration and avoidance of any nephrotoxic medicine    She is off ACE-inhibitor which I prefer to start her back once get clear by cardiologist

## 2019-10-07 NOTE — ASSESSMENT & PLAN NOTE
PTH and phosphorus are within normal range    Will continue to monitor as part of the CKD management

## 2019-11-13 ENCOUNTER — HOSPITAL ENCOUNTER (INPATIENT)
Facility: HOSPITAL | Age: 74
LOS: 2 days | Discharge: HOME | DRG: 392 | End: 2019-11-15
Attending: EMERGENCY MEDICINE | Admitting: HOSPITALIST
Payer: COMMERCIAL

## 2019-11-13 ENCOUNTER — APPOINTMENT (EMERGENCY)
Dept: CT IMAGING | Facility: HOSPITAL | Age: 74
DRG: 392 | End: 2019-11-13
Payer: COMMERCIAL

## 2019-11-13 DIAGNOSIS — E86.0 DEHYDRATION: ICD-10-CM

## 2019-11-13 DIAGNOSIS — N85.8 UTERINE MASS: Primary | ICD-10-CM

## 2019-11-13 DIAGNOSIS — R11.2 NAUSEA AND VOMITING: ICD-10-CM

## 2019-11-13 DIAGNOSIS — I48.91 ATRIAL FIBRILLATION (HCC): ICD-10-CM

## 2019-11-13 LAB
ALBUMIN SERPL BCP-MCNC: 3.2 G/DL (ref 3.5–5)
ALP SERPL-CCNC: 82 U/L (ref 46–116)
ALT SERPL W P-5'-P-CCNC: 21 U/L (ref 12–78)
ANION GAP SERPL CALCULATED.3IONS-SCNC: 20 MMOL/L (ref 4–13)
AST SERPL W P-5'-P-CCNC: 34 U/L (ref 5–45)
ATRIAL RATE: 111 BPM
ATRIAL RATE: 159 BPM
BASOPHILS # BLD AUTO: 0.02 THOUSANDS/ΜL (ref 0–0.1)
BASOPHILS NFR BLD AUTO: 0 % (ref 0–1)
BILIRUB SERPL-MCNC: 1.2 MG/DL (ref 0.2–1)
BUN SERPL-MCNC: 28 MG/DL (ref 5–25)
CALCIUM SERPL-MCNC: 8.7 MG/DL (ref 8.3–10.1)
CHLORIDE SERPL-SCNC: 97 MMOL/L (ref 100–108)
CO2 SERPL-SCNC: 19 MMOL/L (ref 21–32)
CREAT SERPL-MCNC: 1.6 MG/DL (ref 0.6–1.3)
EOSINOPHIL # BLD AUTO: 0 THOUSAND/ΜL (ref 0–0.61)
EOSINOPHIL NFR BLD AUTO: 0 % (ref 0–6)
ERYTHROCYTE [DISTWIDTH] IN BLOOD BY AUTOMATED COUNT: 15.1 % (ref 11.6–15.1)
GFR SERPL CREATININE-BSD FRML MDRD: 32 ML/MIN/1.73SQ M
GLUCOSE SERPL-MCNC: 119 MG/DL (ref 65–140)
GLUCOSE SERPL-MCNC: 144 MG/DL (ref 65–140)
HCT VFR BLD AUTO: 48.2 % (ref 34.8–46.1)
HGB BLD-MCNC: 15.3 G/DL (ref 11.5–15.4)
IMM GRANULOCYTES # BLD AUTO: 0.02 THOUSAND/UL (ref 0–0.2)
IMM GRANULOCYTES NFR BLD AUTO: 0 % (ref 0–2)
LIPASE SERPL-CCNC: 118 U/L (ref 73–393)
LYMPHOCYTES # BLD AUTO: 0.69 THOUSANDS/ΜL (ref 0.6–4.47)
LYMPHOCYTES NFR BLD AUTO: 9 % (ref 14–44)
MCH RBC QN AUTO: 27.1 PG (ref 26.8–34.3)
MCHC RBC AUTO-ENTMCNC: 31.7 G/DL (ref 31.4–37.4)
MCV RBC AUTO: 86 FL (ref 82–98)
MONOCYTES # BLD AUTO: 0.45 THOUSAND/ΜL (ref 0.17–1.22)
MONOCYTES NFR BLD AUTO: 6 % (ref 4–12)
NEUTROPHILS # BLD AUTO: 6.5 THOUSANDS/ΜL (ref 1.85–7.62)
NEUTS SEG NFR BLD AUTO: 85 % (ref 43–75)
NRBC BLD AUTO-RTO: 0 /100 WBCS
PLATELET # BLD AUTO: 227 THOUSANDS/UL (ref 149–390)
PMV BLD AUTO: 9.7 FL (ref 8.9–12.7)
POTASSIUM SERPL-SCNC: 3.9 MMOL/L (ref 3.5–5.3)
PROT SERPL-MCNC: 7.4 G/DL (ref 6.4–8.2)
QRS AXIS: -35 DEGREES
QRS AXIS: -43 DEGREES
QRSD INTERVAL: 74 MS
QRSD INTERVAL: 76 MS
QT INTERVAL: 326 MS
QT INTERVAL: 372 MS
QTC INTERVAL: 483 MS
QTC INTERVAL: 553 MS
RBC # BLD AUTO: 5.64 MILLION/UL (ref 3.81–5.12)
SODIUM SERPL-SCNC: 136 MMOL/L (ref 136–145)
T WAVE AXIS: 77 DEGREES
T WAVE AXIS: 93 DEGREES
TROPONIN I SERPL-MCNC: <0.02 NG/ML
VENTRICULAR RATE: 132 BPM
VENTRICULAR RATE: 133 BPM
WBC # BLD AUTO: 7.68 THOUSAND/UL (ref 4.31–10.16)

## 2019-11-13 PROCEDURE — 93005 ELECTROCARDIOGRAM TRACING: CPT

## 2019-11-13 PROCEDURE — 99223 1ST HOSP IP/OBS HIGH 75: CPT | Performed by: HOSPITALIST

## 2019-11-13 PROCEDURE — 82948 REAGENT STRIP/BLOOD GLUCOSE: CPT

## 2019-11-13 PROCEDURE — 84484 ASSAY OF TROPONIN QUANT: CPT | Performed by: EMERGENCY MEDICINE

## 2019-11-13 PROCEDURE — 99285 EMERGENCY DEPT VISIT HI MDM: CPT | Performed by: EMERGENCY MEDICINE

## 2019-11-13 PROCEDURE — 96374 THER/PROPH/DIAG INJ IV PUSH: CPT

## 2019-11-13 PROCEDURE — 93010 ELECTROCARDIOGRAM REPORT: CPT | Performed by: INTERNAL MEDICINE

## 2019-11-13 PROCEDURE — 74176 CT ABD & PELVIS W/O CONTRAST: CPT

## 2019-11-13 PROCEDURE — 80053 COMPREHEN METABOLIC PANEL: CPT | Performed by: EMERGENCY MEDICINE

## 2019-11-13 PROCEDURE — 96361 HYDRATE IV INFUSION ADD-ON: CPT

## 2019-11-13 PROCEDURE — 83690 ASSAY OF LIPASE: CPT | Performed by: EMERGENCY MEDICINE

## 2019-11-13 PROCEDURE — 99285 EMERGENCY DEPT VISIT HI MDM: CPT

## 2019-11-13 PROCEDURE — 85025 COMPLETE CBC W/AUTO DIFF WBC: CPT | Performed by: EMERGENCY MEDICINE

## 2019-11-13 PROCEDURE — 36415 COLL VENOUS BLD VENIPUNCTURE: CPT | Performed by: EMERGENCY MEDICINE

## 2019-11-13 PROCEDURE — 96375 TX/PRO/DX INJ NEW DRUG ADDON: CPT

## 2019-11-13 RX ORDER — MONTELUKAST SODIUM 10 MG/1
10 TABLET ORAL
Status: DISCONTINUED | OUTPATIENT
Start: 2019-11-13 | End: 2019-11-15 | Stop reason: HOSPADM

## 2019-11-13 RX ORDER — ONDANSETRON 2 MG/ML
4 INJECTION INTRAMUSCULAR; INTRAVENOUS ONCE
Status: COMPLETED | OUTPATIENT
Start: 2019-11-13 | End: 2019-11-13

## 2019-11-13 RX ORDER — LABETALOL 20 MG/4 ML (5 MG/ML) INTRAVENOUS SYRINGE
10 ONCE
Status: COMPLETED | OUTPATIENT
Start: 2019-11-13 | End: 2019-11-13

## 2019-11-13 RX ORDER — SODIUM CHLORIDE 9 MG/ML
75 INJECTION, SOLUTION INTRAVENOUS CONTINUOUS
Status: DISCONTINUED | OUTPATIENT
Start: 2019-11-13 | End: 2019-11-14

## 2019-11-13 RX ORDER — METOPROLOL SUCCINATE 100 MG/1
200 TABLET, EXTENDED RELEASE ORAL DAILY
Status: DISCONTINUED | OUTPATIENT
Start: 2019-11-14 | End: 2019-11-15 | Stop reason: HOSPADM

## 2019-11-13 RX ORDER — DILTIAZEM HYDROCHLORIDE 5 MG/ML
20 INJECTION INTRAVENOUS ONCE
Status: COMPLETED | OUTPATIENT
Start: 2019-11-13 | End: 2019-11-13

## 2019-11-13 RX ORDER — METOCLOPRAMIDE HYDROCHLORIDE 5 MG/ML
10 INJECTION INTRAMUSCULAR; INTRAVENOUS ONCE
Status: COMPLETED | OUTPATIENT
Start: 2019-11-13 | End: 2019-11-13

## 2019-11-13 RX ORDER — LEVOTHYROXINE SODIUM 175 UG/1
175 TABLET ORAL
Status: DISCONTINUED | OUTPATIENT
Start: 2019-11-14 | End: 2019-11-15 | Stop reason: HOSPADM

## 2019-11-13 RX ORDER — ONDANSETRON 2 MG/ML
4 INJECTION INTRAMUSCULAR; INTRAVENOUS EVERY 6 HOURS PRN
Status: DISCONTINUED | OUTPATIENT
Start: 2019-11-13 | End: 2019-11-15 | Stop reason: HOSPADM

## 2019-11-13 RX ADMIN — APIXABAN 5 MG: 5 TABLET, FILM COATED ORAL at 20:20

## 2019-11-13 RX ADMIN — DILTIAZEM HYDROCHLORIDE 20 MG: 5 INJECTION INTRAVENOUS at 15:18

## 2019-11-13 RX ADMIN — ONDANSETRON 4 MG: 2 INJECTION INTRAMUSCULAR; INTRAVENOUS at 14:57

## 2019-11-13 RX ADMIN — SODIUM CHLORIDE 75 ML/HR: 0.9 INJECTION, SOLUTION INTRAVENOUS at 21:49

## 2019-11-13 RX ADMIN — DILTIAZEM HYDROCHLORIDE 5 MG/HR: 5 INJECTION INTRAVENOUS at 20:12

## 2019-11-13 RX ADMIN — LABETALOL 20 MG/4 ML (5 MG/ML) INTRAVENOUS SYRINGE 10 MG: at 16:33

## 2019-11-13 RX ADMIN — METOCLOPRAMIDE 10 MG: 5 INJECTION, SOLUTION INTRAMUSCULAR; INTRAVENOUS at 16:32

## 2019-11-13 RX ADMIN — SODIUM CHLORIDE 1000 ML: 0.9 INJECTION, SOLUTION INTRAVENOUS at 14:55

## 2019-11-13 NOTE — ED NOTES
1  CC    2  Is this admission due to an injury? 3  Orientation status A&OX3    4  Abnormal labs/ focused assessment/ vitals-CT    5  Medications/ drips-reglan/zofran    6  Narcotic time/ pain medications- none    7  IV lines/drains/ etc  20G rt AC    8  Isolation status-none    9  Skin-intact    10  Ambulation status-with assist    11   ED phone number [phone]     Sita Herrera RN  11/13/19 1771

## 2019-11-13 NOTE — ED PROVIDER NOTES
History  Chief Complaint   Patient presents with    Abdominal Pain     Patient c/o mid center abdominal pain that started Monday  Patient c/o nausea and vomitting  77-year-old female presents with nausea and vomiting over the past few days  Because of this she has been unable to keep down her cardiac medication (labetolol and cardizem)  She presents in AFib with RVR  She has no pain - denies CP, no Abd pain, just complains of nausea and vomiting  Vomits green here  Prior to Admission Medications   Prescriptions Last Dose Informant Patient Reported? Taking?    Multiple Vitamins-Minerals (CENTRUM SILVER) tablet 11/13/2019 at Unknown time Self Yes Yes   Sig: Take 1 tablet by mouth daily   TRADJENTA 5 MG TABS 11/13/2019 at Unknown time Self Yes Yes   Sig: Take 5 mg by mouth daily   Vitamin D, Ergocalciferol, 2000 units CAPS 11/13/2019 at Unknown time Self Yes Yes   Sig: Take 2,000 capsules by mouth daily   apixaban (ELIQUIS) 5 mg 11/13/2019 at Unknown time Self Yes Yes   Sig: Take 5 mg by mouth 2 (two) times a day   diltiazem (TIAZAC) 180 MG 24 hr capsule 11/13/2019 at Unknown time Self Yes Yes   Sig: Take 360 mg by mouth Daily    levothyroxine (LEVO-T) 175 mcg tablet 11/13/2019 at Unknown time Self Yes Yes   Sig: Take by mouth   metoprolol succinate (TOPROL-XL) 100 mg 24 hr tablet 11/13/2019 at Unknown time Self Yes Yes   Sig: Take 2 tablets by mouth daily   montelukast (SINGULAIR) 10 mg tablet 11/13/2019 at Unknown time Self Yes Yes   Sig: Take 10 mg by mouth daily at bedtime   simvastatin (ZOCOR) 40 mg tablet 11/13/2019 at Unknown time Self Yes Yes   Sig: simvastatin 40 mg tablet   TAKE 1 TABLET BY MOUTH EVERY DAY   tiotropium (SPIRIVA RESPIMAT) 1 25 MCG/ACT AERS inhaler 11/13/2019 at Unknown time Self Yes Yes   Sig: Inhale 2 puffs daily      Facility-Administered Medications: None       Past Medical History:   Diagnosis Date    [facility] [address] [facility]     Anemia     Chronic kidney disease     Diabetes mellitus ([facility] [address] [facility]     Hypertension        Past Surgical History:   Procedure Laterality Date    CATARACT EXTRACTION, BILATERAL      CHOLECYSTECTOMY      GALLBLADDER SURGERY      HERNIA REPAIR      REPLACEMENT TOTAL KNEE      TONSILLECTOMY         Family History   Problem Relation Age of Onset    No Known Problems Mother     Heart disease Father     No Known Problems Sister      I have reviewed and agree with the history as documented  Social History     Tobacco Use    Smoking status: Former Smoker    Smokeless tobacco: Former User   Substance Use Topics    Alcohol use: No    Drug use: No        Review of Systems   Constitutional: Positive for appetite change and fatigue  Negative for chills, diaphoresis and fever  HENT: Negative for congestion and sore throat  Respiratory: Negative for apnea, cough, chest tightness and shortness of breath  Cardiovascular: Positive for chest pain (patient states it is GERD)  Negative for palpitations and leg swelling  Gastrointestinal: Positive for nausea and vomiting  Negative for abdominal pain, constipation and diarrhea  Genitourinary: Positive for decreased urine volume  Negative for dysuria and flank pain  Musculoskeletal: Negative for back pain and neck pain  Skin: Negative for color change and rash  Allergic/Immunologic: Negative for immunocompromised state  Neurological: Negative for dizziness, syncope and headaches  Psychiatric/Behavioral: Negative for confusion  Physical Exam  Physical Exam   Constitutional: She is oriented to person, place, and time  She appears well-developed and well-nourished  Non-toxic appearance  She does not appear ill  No distress  HENT:   Head: Normocephalic and atraumatic  Oropharynx is clear but dry consistent dehydration   Eyes: Pupils are equal, round, and reactive to light  Conjunctivae and EOM are normal  Right eye exhibits no discharge  Left eye exhibits no discharge  No scleral icterus     Neck: Normal range of motion  Neck supple  No JVD present  Cardiovascular: Normal heart sounds and intact distal pulses  Exam reveals no gallop and no friction rub  No murmur heard  Tachycardia, AFib with RVR   Pulmonary/Chest: Effort normal and breath sounds normal  No respiratory distress  She has no wheezes  She has no rhonchi  She has no rales  She exhibits no tenderness  Abdominal: Soft  Bowel sounds are normal  She exhibits no distension  There is no tenderness  There is no rebound and no guarding  Musculoskeletal: Normal range of motion  She exhibits no edema, tenderness or deformity  Neurological: She is alert and oriented to person, place, and time  No cranial nerve deficit  Skin: Skin is warm and dry  No rash noted  She is not diaphoretic  No erythema  No pallor  Psychiatric: She has a normal mood and affect  Her behavior is normal    Vitals reviewed        Vital Signs  ED Triage Vitals   Temperature Pulse Respirations Blood Pressure SpO2   11/13/19 1318 11/13/19 1318 11/13/19 1318 11/13/19 1318 11/13/19 1318   98 1 °F (36 7 °C) 88 18 140/76 99 %      Temp Source Heart Rate Source Patient Position - Orthostatic VS BP Location FiO2 (%)   11/13/19 1318 11/13/19 1318 11/13/19 1318 11/13/19 1318 --   Oral Monitor Sitting Left arm       Pain Score       11/13/19 1530       No Pain           Vitals:    11/13/19 1445 11/13/19 1530 11/13/19 1600 11/13/19 1630   BP: 127/69 114/51 111/59 100/74   Pulse: (!) 127 (!) 112 (!) 114 (!) 117   Patient Position - Orthostatic VS:  Lying Sitting Sitting         Visual Acuity      ED Medications  Medications   sodium chloride 0 9 % bolus 1,000 mL (0 mL Intravenous Stopped 11/13/19 1555)   ondansetron (ZOFRAN) injection 4 mg (4 mg Intravenous Given 11/13/19 1457)   diltiazem (CARDIZEM) injection 20 mg (20 mg Intravenous Given 11/13/19 1518)   metoclopramide (REGLAN) injection 10 mg (10 mg Intravenous Given 11/13/19 1632)   Labetalol HCl (NORMODYNE) injection 10 mg (10 mg Intravenous Given 11/13/19 1633)       Diagnostic Studies  Results Reviewed     Procedure Component Value Units Date/Time    Troponin I [[id number]]  (Normal) Collected:  11/13/19 1454    Lab Status:  Final result Specimen:  Blood from Arm, Right Updated:  11/13/19 1526     Troponin I <0 02 ng/mL     Comprehensive metabolic panel [[id number]]  (Abnormal) Collected:  11/13/19 1454    Lab Status:  Final result Specimen:  Blood from Arm, Right Updated:  11/13/19 1524     Sodium 136 mmol/L      Potassium 3 9 mmol/L      Chloride 97 mmol/L      CO2 19 mmol/L      ANION GAP 20 mmol/L      BUN 28 mg/dL      Creatinine 1 60 mg/dL      Glucose 144 mg/dL      Calcium 8 7 mg/dL      AST 34 U/L      ALT 21 U/L      Alkaline Phosphatase 82 U/L      Total Protein 7 4 g/dL      Albumin 3 2 g/dL      Total Bilirubin 1 20 mg/dL      eGFR 32 ml/min/1 73sq m     Narrative:       [place] guidelines for Chronic Kidney Disease (CKD):     Stage 1 with normal or high GFR (GFR > 90 mL/min/1 73 square meters)    Stage 2 Mild CKD (GFR = 60-89 mL/min/1 73 square meters)    Stage 3A Moderate CKD (GFR = 45-59 mL/min/1 73 square meters)    Stage 3B Moderate CKD (GFR = 30-44 mL/min/1 73 square meters)    Stage 4 Severe CKD (GFR = 15-29 mL/min/1 73 square meters)    Stage 5 End Stage CKD (GFR <15 mL/min/1 73 square meters)  Note: GFR calculation is accurate only with a steady state creatinine    Lipase [[id number]]  (Normal) Collected:  11/13/19 1454    Lab Status:  Final result Specimen:  Blood from Arm, Right Updated:  11/13/19 1524     Lipase 118 u/L     CBC and differential [[id number]]  (Abnormal) Collected:  11/13/19 1454    Lab Status:  Final result Specimen:  Blood from Arm, Right Updated:  11/13/19 1506     WBC 7 68 Thousand/uL      RBC 5 64 Million/uL      Hemoglobin 15 3 g/dL      Hematocrit 48 2 %      MCV 86 fL      MCH 27 1 pg      MCHC 31 7 g/dL      RDW 15 1 %      MPV 9 7 fL      Platelets 280 Thousands/uL      nRBC 0 /100 WBCs      Neutrophils Relative 85 %      Immat GRANS % 0 %      Lymphocytes Relative 9 %      Monocytes Relative 6 %      Eosinophils Relative 0 %      Basophils Relative 0 %      Neutrophils Absolute 6 50 Thousands/µL      Immature Grans Absolute 0 02 Thousand/uL      Lymphocytes Absolute 0 69 Thousands/µL      Monocytes Absolute 0 45 Thousand/µL      Eosinophils Absolute 0 00 Thousand/µL      Basophils Absolute 0 02 Thousands/µL     UA w Reflex to Microscopic w Reflex to Culture [[id number]]     Lab Status:  No result Specimen:  Urine                  CT abdomen pelvis wo contrast   ED Interpretation by Clint Haynes DO (11/13 1740)   No acute inflammatory changes in the abdomen or pelvis       Hazy central mesenteric edema with multiple subcentimeter mesenteric lymph nodes of a nonspecific degree may indicate a mild chronic mesenteric panniculitis       Dense right adnexal lesion measuring 5 6 cm in keeping with 1 more likely to represent a uterine fibroid than a right adnexal hemorrhagic cyst  This can be better characterized with pelvic ultrasound  Final Result by Shyam Vasquez MD (11/13 1733)      No acute inflammatory changes in the abdomen or pelvis      Hazy central mesenteric edema with multiple subcentimeter mesenteric lymph nodes of a nonspecific degree may indicate a mild chronic mesenteric panniculitis      Dense right adnexal lesion measuring 5 6 cm in keeping with 1 more likely to represent a uterine fibroid than a right adnexal hemorrhagic cyst  This can be better characterized with pelvic ultrasound  The study was marked in [facility] for immediate notification            Workstation performed: [id number]                    Procedures  ECG 12 Lead Documentation Only  Date/Time: 11/13/2019 2:46 PM  Performed by: Clint Haynes DO  Authorized by: Clint Haynes DO     Indications / Diagnosis:  Atrial fibrillation  ECG reviewed by me, the ED Provider: yes    Patient location:  ED  Previous ECG:     Previous ECG:  Unavailable    Comparison to cardiac monitor: Yes    Interpretation:     Interpretation: abnormal    Rate:     ECG rate:  133    ECG rate assessment: tachycardic    Rhythm:     Rhythm comment:  Atrial fibrillation with RVR  Ectopy:     Ectopy: none    QRS:     QRS axis:  Normal    QRS intervals:  Normal  Conduction:     Conduction: abnormal    ST segments:     ST segments:  Normal  T waves:     T waves: normal             ED Course  ED Course as of Nov 13 1750   Wed Nov 13, 2019   1459 Currently in atrial fibrillation with RVR  Patient admits that she was unable to keep down metoprolol or Cardizem this morning because she threw them up  Will give her to them IV        1533 Dehydration, worsening from prior  Creatinine(!): 1 60                               MDM  Number of Diagnoses or Management Options  Atrial fibrillation [facility]):   Dehydration:   Nausea and vomiting:   Uterine mass:   Diagnosis management comments: Nausea vomiting over the past few days, patient has been unable to keep down food or fluid, or her medications  She is presenting in AFib with RVR  She is dehydrated  Worsening of her PRATIMA  Unlikely for her to be able to keep down her medications at home, she is admitted for dehydration, PRATIMA, and IV treatment of underlying AFib with RVR         Amount and/or Complexity of Data Reviewed  Clinical lab tests: reviewed and ordered  Tests in the radiology section of CPT®: ordered and reviewed        Disposition  Final diagnoses:   Uterine mass   Dehydration   Atrial fibrillation ([facility] [address] )   Nausea and vomiting     Time reflects when diagnosis was documented in both MDM as applicable and the Disposition within this note     Time User Action Codes Description Comment    11/13/2019  5:40 PM Jonathan Gu Add [N85 8] Uterine mass     11/13/2019  5:40 PM Charley Gu Add [E86 0] Dehydration     11/13/2019  5:41 PM Florecita Wagner [I48 91] Atrial fibrillation ([facility] [address] )     11/13/2019  5:41 PM Trae Gu [R11 2] Nausea and vomiting       ED Disposition     ED Disposition Condition Date/Time Comment    Admit Stable Wed Nov 13, 2019  5:50 PM Case was discussed with Shakeel VILA) and the patient's admission status was agreed to be Admission Status: inpatient status to the service of [facility]   Follow-up Information    None         Patient's Medications   Discharge Prescriptions    No medications on file     No discharge procedures on file      ED Provider  Electronically Signed by           Isa Mukherjee DO  11/13/19 3880

## 2019-11-14 LAB
ANION GAP SERPL CALCULATED.3IONS-SCNC: 21 MMOL/L (ref 4–13)
BACTERIA UR QL AUTO: ABNORMAL /HPF
BASOPHILS # BLD AUTO: 0.01 THOUSANDS/ΜL (ref 0–0.1)
BASOPHILS NFR BLD AUTO: 0 % (ref 0–1)
BILIRUB UR QL STRIP: ABNORMAL
BUN SERPL-MCNC: 28 MG/DL (ref 5–25)
CALCIUM SERPL-MCNC: 7.8 MG/DL (ref 8.3–10.1)
CHLORIDE SERPL-SCNC: 99 MMOL/L (ref 100–108)
CLARITY UR: ABNORMAL
CO2 SERPL-SCNC: 15 MMOL/L (ref 21–32)
COLOR UR: YELLOW
CREAT SERPL-MCNC: 1.49 MG/DL (ref 0.6–1.3)
EOSINOPHIL # BLD AUTO: 0 THOUSAND/ΜL (ref 0–0.61)
EOSINOPHIL NFR BLD AUTO: 0 % (ref 0–6)
ERYTHROCYTE [DISTWIDTH] IN BLOOD BY AUTOMATED COUNT: 15 % (ref 11.6–15.1)
GFR SERPL CREATININE-BSD FRML MDRD: 35 ML/MIN/1.73SQ M
GLUCOSE SERPL-MCNC: 112 MG/DL (ref 65–140)
GLUCOSE SERPL-MCNC: 124 MG/DL (ref 65–140)
GLUCOSE SERPL-MCNC: 156 MG/DL (ref 65–140)
GLUCOSE SERPL-MCNC: 164 MG/DL (ref 65–140)
GLUCOSE SERPL-MCNC: 181 MG/DL (ref 65–140)
GLUCOSE UR STRIP-MCNC: ABNORMAL MG/DL
HCT VFR BLD AUTO: 39.6 % (ref 34.8–46.1)
HGB BLD-MCNC: 12.4 G/DL (ref 11.5–15.4)
HGB UR QL STRIP.AUTO: ABNORMAL
IMM GRANULOCYTES # BLD AUTO: 0.02 THOUSAND/UL (ref 0–0.2)
IMM GRANULOCYTES NFR BLD AUTO: 0 % (ref 0–2)
KETONES UR STRIP-MCNC: ABNORMAL MG/DL
LEUKOCYTE ESTERASE UR QL STRIP: ABNORMAL
LYMPHOCYTES # BLD AUTO: 1.01 THOUSANDS/ΜL (ref 0.6–4.47)
LYMPHOCYTES NFR BLD AUTO: 18 % (ref 14–44)
MAGNESIUM SERPL-MCNC: 1.4 MG/DL (ref 1.6–2.6)
MCH RBC QN AUTO: 26.4 PG (ref 26.8–34.3)
MCHC RBC AUTO-ENTMCNC: 31.3 G/DL (ref 31.4–37.4)
MCV RBC AUTO: 84 FL (ref 82–98)
MONOCYTES # BLD AUTO: 0.5 THOUSAND/ΜL (ref 0.17–1.22)
MONOCYTES NFR BLD AUTO: 9 % (ref 4–12)
MUCOUS THREADS UR QL AUTO: ABNORMAL
NEUTROPHILS # BLD AUTO: 4.1 THOUSANDS/ΜL (ref 1.85–7.62)
NEUTS SEG NFR BLD AUTO: 73 % (ref 43–75)
NITRITE UR QL STRIP: NEGATIVE
NON-SQ EPI CELLS URNS QL MICRO: ABNORMAL /HPF
NRBC BLD AUTO-RTO: 0 /100 WBCS
PH UR STRIP.AUTO: 6 [PH]
PLATELET # BLD AUTO: 190 THOUSANDS/UL (ref 149–390)
PMV BLD AUTO: 9.8 FL (ref 8.9–12.7)
POTASSIUM SERPL-SCNC: 3.4 MMOL/L (ref 3.5–5.3)
PROT UR STRIP-MCNC: ABNORMAL MG/DL
RBC # BLD AUTO: 4.7 MILLION/UL (ref 3.81–5.12)
RBC #/AREA URNS AUTO: ABNORMAL /HPF
SODIUM SERPL-SCNC: 135 MMOL/L (ref 136–145)
SP GR UR STRIP.AUTO: 1.02 (ref 1–1.03)
UROBILINOGEN UR QL STRIP.AUTO: 0.2 E.U./DL
WBC # BLD AUTO: 5.64 THOUSAND/UL (ref 4.31–10.16)
WBC #/AREA URNS AUTO: ABNORMAL /HPF

## 2019-11-14 PROCEDURE — 87086 URINE CULTURE/COLONY COUNT: CPT | Performed by: HOSPITALIST

## 2019-11-14 PROCEDURE — 99232 SBSQ HOSP IP/OBS MODERATE 35: CPT | Performed by: INTERNAL MEDICINE

## 2019-11-14 PROCEDURE — 85025 COMPLETE CBC W/AUTO DIFF WBC: CPT | Performed by: HOSPITALIST

## 2019-11-14 PROCEDURE — 90662 IIV NO PRSV INCREASED AG IM: CPT | Performed by: HOSPITALIST

## 2019-11-14 PROCEDURE — 82948 REAGENT STRIP/BLOOD GLUCOSE: CPT

## 2019-11-14 PROCEDURE — 90471 IMMUNIZATION ADMIN: CPT | Performed by: HOSPITALIST

## 2019-11-14 PROCEDURE — 83735 ASSAY OF MAGNESIUM: CPT | Performed by: HOSPITALIST

## 2019-11-14 PROCEDURE — 81001 URINALYSIS AUTO W/SCOPE: CPT | Performed by: HOSPITALIST

## 2019-11-14 PROCEDURE — 80048 BASIC METABOLIC PNL TOTAL CA: CPT | Performed by: HOSPITALIST

## 2019-11-14 RX ORDER — DILTIAZEM HYDROCHLORIDE 180 MG/1
360 CAPSULE, COATED, EXTENDED RELEASE ORAL DAILY
Status: DISCONTINUED | OUTPATIENT
Start: 2019-11-14 | End: 2019-11-15 | Stop reason: HOSPADM

## 2019-11-14 RX ORDER — POTASSIUM CHLORIDE 20 MEQ/1
40 TABLET, EXTENDED RELEASE ORAL ONCE
Status: COMPLETED | OUTPATIENT
Start: 2019-11-14 | End: 2019-11-14

## 2019-11-14 RX ORDER — MAGNESIUM SULFATE HEPTAHYDRATE 40 MG/ML
2 INJECTION, SOLUTION INTRAVENOUS ONCE
Status: COMPLETED | OUTPATIENT
Start: 2019-11-14 | End: 2019-11-14

## 2019-11-14 RX ADMIN — DILTIAZEM HYDROCHLORIDE 360 MG: 180 CAPSULE, COATED, EXTENDED RELEASE ORAL at 09:10

## 2019-11-14 RX ADMIN — INSULIN LISPRO 1 UNITS: 100 INJECTION, SOLUTION INTRAVENOUS; SUBCUTANEOUS at 17:17

## 2019-11-14 RX ADMIN — INFLUENZA A VIRUS A/MICHIGAN/45/2015 X-275 (H1N1) ANTIGEN (FORMALDEHYDE INACTIVATED), INFLUENZA A VIRUS A/SINGAPORE/INFIMH-16-0019/2016 IVR-186 (H3N2) ANTIGEN (FORMALDEHYDE INACTIVATED), AND INFLUENZA B VIRUS B/MARYLAND/15/2016 BX-69A (A B/COLORADO/6/2017-LIKE VIRUS) ANTIGEN (FORMALDEHYDE INACTIVATED) 0.5 ML: 60; 60; 60 INJECTION, SUSPENSION INTRAMUSCULAR at 05:10

## 2019-11-14 RX ADMIN — POTASSIUM CHLORIDE 40 MEQ: 1500 TABLET, EXTENDED RELEASE ORAL at 09:52

## 2019-11-14 RX ADMIN — MONTELUKAST SODIUM 10 MG: 10 TABLET, FILM COATED ORAL at 21:39

## 2019-11-14 RX ADMIN — APIXABAN 5 MG: 5 TABLET, FILM COATED ORAL at 08:14

## 2019-11-14 RX ADMIN — LEVOTHYROXINE SODIUM 175 MCG: 175 TABLET ORAL at 05:10

## 2019-11-14 RX ADMIN — INSULIN LISPRO 1 UNITS: 100 INJECTION, SOLUTION INTRAVENOUS; SUBCUTANEOUS at 11:31

## 2019-11-14 RX ADMIN — METOPROLOL SUCCINATE 200 MG: 100 TABLET, EXTENDED RELEASE ORAL at 08:14

## 2019-11-14 RX ADMIN — DILTIAZEM HYDROCHLORIDE 15 MG/HR: 5 INJECTION INTRAVENOUS at 03:46

## 2019-11-14 RX ADMIN — TIOTROPIUM BROMIDE 18 MCG: 18 CAPSULE ORAL; RESPIRATORY (INHALATION) at 08:16

## 2019-11-14 RX ADMIN — APIXABAN 5 MG: 5 TABLET, FILM COATED ORAL at 17:18

## 2019-11-14 RX ADMIN — MAGNESIUM SULFATE HEPTAHYDRATE 2 G: 40 INJECTION, SOLUTION INTRAVENOUS at 09:52

## 2019-11-14 NOTE — MALNUTRITION/BMI
This medical record reflects one or more clinical indicators suggestive of malnutrition and/or morbid obesity  Morbid obesity class III r/t energy intake > energy output over time as evidenced by BMI 40 87       BMI Findings:  BMI Classifications: Morbid Obesity 40-44 9     Body mass index is 40 87 kg/m²  See Nutrition note dated 11/14/19 for additional details  Completed nutrition assessment is viewable in the nutrition documentation

## 2019-11-14 NOTE — UTILIZATION REVIEW
Initial Clinical Review    Admission: Date/Time/Statement: Inpatient Admission Orders (From admission, onward)     Ordered        11/13/19 1750  Inpatient Admission  Once                   Orders Placed This Encounter   Procedures    Inpatient Admission     Standing Status:   Standing     Number of Occurrences:   1     Order Specific Question:   Admitting Physician     Answer:   Balbina Keen [[id number]]     Order Specific Question:   Level of Care     Answer:   Med Surg [[id number]]     Order Specific Question:   Estimated length of stay     Answer:   More than 2 Midnights     Order Specific Question:   Certification     Answer:   I certify that inpatient services are medically necessary for this patient for a duration of greater than two midnights  See H&P and MD Progress Notes for additional information about the patient's course of treatment  ED Arrival Information     Expected Arrival Acuity Means of Arrival Escorted By Service Admission Type    - 11/13/2019 13:14 Urgent Walk-In Family Member Hospitalist Urgent    Arrival Complaint    Vomiting        Chief Complaint   Patient presents with    Abdominal Pain     Patient c/o mid center abdominal pain that started Monday  Patient c/o nausea and vomitting  Assessment/Plan:  69year old female to the ED from home with complaints of abdominal pain, nausea, and vomiting for 2 days prior to her arrival   Admitted to ICU stepdown  inpatient for intractable nausea and vomiting, afib with RVR  She has been unable to take her medications due to vomiting and is found to be in afib with HR In the 110-120s  Given IV Cardizem in the ED and started on drip  Activly vomiting green vomitous in the ED, denies abdominal pain  GIven IV zofran with relief  Oropharynx is clear but dry in keeping with dehydration in addition to PRATIMA  Gentle IV fluids initiated  LUngs clear   Intractable nausea and vomiting likely due to gatro enteritis, and possibly new diabetic med she recently started     ED Triage Vitals   Temperature Pulse Respirations Blood Pressure SpO2   11/13/19 1318 11/13/19 1318 11/13/19 1318 11/13/19 1318 11/13/19 1318   98 1 °F (36 7 °C) 88 18 140/76 99 %      Temp Source Heart Rate Source Patient Position - Orthostatic VS BP Location FiO2 (%)   11/13/19 1318 11/13/19 1318 11/13/19 1318 11/13/19 1318 --   Oral Monitor Sitting Left arm       Pain Score       11/13/19 1530       No Pain        Wt Readings from Last 1 Encounters:   10/07/19 108 kg (237 lb 6 4 oz)     Additional Vital Signs:   Date/Time Temp Pulse Resp BP MAP (mmHg) SpO2 O2 Device Patient Position - Orthostatic VS   11/14/19 07:31:50 97 6 °F (36 4 °C) 85 18 106/66 79 98 % None (Room air)    11/14/19 03:10:08 98 °F (36 7 °C) 98 18 97/65 76 97 % Nasal cannula Lying   11/13/19 22:58:24 98 2 °F (36 8 °C) 114Abnormal  18 111/68 82 95 %     11/13/19 22:36:01  108Abnormal  18 115/71 86 95 %     11/13/19 22:18:26  109Abnormal  17 114/72 86 97 %     11/13/19 21:42:31 98 2 °F (36 8 °C) 120Abnormal  19 110/70 83 96 %     11/13/19 1830  119Abnormal  20 119/81  94 %     11/13/19 1800  121Abnormal  24Abnormal  109/67  95 % Nasal cannula Sitting   11/13/19 1630  117Abnormal  19 100/74  97 % None (Room air) Sitting   11/13/19 1600  114Abnormal  19 111/59  93 % None (Room air) Sitting   11/13/19 1530  112Abnormal  18 114/51  98 % None (Room air) Lying   11/13/19 1500       None (Room air)    11/13/19 1445  127Abnormal  19 127/69           Pertinent Labs/Diagnostic Test Results:   CT A/P 11/13: No acute inflammatory changes in the abdomen or pelvis  Hazy central mesenteric edema with multiple subcentimeter mesenteric lymph nodes of a nonspecific degree may indicate a mild chronic mesenteric panniculitis  Dense right adnexal lesion measuring 5 6 cm in keeping with 1 more likely to represent a uterine fibroid than a right adnexal hemorrhagic cyst  This can be better characterized with pelvic ultrasound  EKG:   Interpretation:     Interpretation: abnormal    Rate:     ECG rate:  133    ECG rate assessment: tachycardic    Rhythm:     Rhythm comment:  Atrial fibrillation with RVR  Ectopy:     Ectopy: none    QRS:     QRS axis:  Normal    QRS intervals:  Normal  Conduction:     Conduction: abnormal    ST segments:     ST segments:  Normal  T waves:     T waves: normal    Results from last 7 days   Lab Units 11/14/19  0519 11/13/19  1454   WBC Thousand/uL 5 64 7 68   HEMOGLOBIN g/dL 12 4 15 3   HEMATOCRIT % 39 6 48 2*   PLATELETS Thousands/uL 190 227   NEUTROS ABS Thousands/µL 4 10 6 50         Results from last 7 days   Lab Units 11/14/19  0519 11/13/19  1454   SODIUM mmol/L 135* 136   POTASSIUM mmol/L 3 4* 3 9   CHLORIDE mmol/L 99* 97*   CO2 mmol/L 15* 19*   ANION GAP mmol/L 21* 20*   BUN mg/dL 28* 28*   CREATININE mg/dL 1 49* 1 60*   EGFR ml/min/1 73sq m 35 32   CALCIUM mg/dL 7 8* 8 7   MAGNESIUM mg/dL 1 4*  --      Results from last 7 days   Lab Units 11/13/19  1454   AST U/L 34   ALT U/L 21   ALK PHOS U/L 82   TOTAL PROTEIN g/dL 7 4   ALBUMIN g/dL 3 2*   TOTAL BILIRUBIN mg/dL 1 20*     Results from last 7 days   Lab Units 11/14/19  1113 11/14/19  0626 11/13/19  2111   POC GLUCOSE mg/dl 164* 124 119     Results from last 7 days   Lab Units 11/14/19  0519 11/13/19  1454   GLUCOSE RANDOM mg/dL 112 144*       Results from last 7 days   Lab Units 11/13/19  1454   TROPONIN I ng/mL <0 02     Results from last 7 days   Lab Units 11/13/19  1454   LIPASE u/L 118       ED Treatment:   Medication Administration from 11/13/2019 1314 to 11/13/2019 1925       Date/Time Order Dose Route Action     11/13/2019 1455 sodium chloride 0 9 % bolus 1,000 mL 1,000 mL Intravenous New Bag     11/13/2019 1457 ondansetron (ZOFRAN) injection 4 mg 4 mg Intravenous Given     11/13/2019 1518 diltiazem (CARDIZEM) injection 20 mg 20 mg Intravenous Given     11/13/2019 1632 metoclopramide (REGLAN) injection 10 mg 10 mg Intravenous Given 11/13/2019 1633 Labetalol HCl (NORMODYNE) injection 10 mg 10 mg Intravenous Given        Past Medical History:   Diagnosis Date    A-fib ([facility] )     Anemia     Chronic kidney disease     Diabetes mellitus ([facility] )     Hypertension      Admitting Diagnosis: Atrial fibrillation ([facility] ) [I48 91]  Dehydration [E86 0]  Abdominal pain [R10 9]  Nausea and vomiting [R11 2]  Uterine mass [N85 8]  Age/Sex: 69 y o  female  Admission Orders:  Tele  UA with reflex to micro  Scheduled Medications:    Medications:  apixaban 5 mg Oral BID   diltiazem 360 mg Oral Daily   insulin lispro 1-6 Units Subcutaneous TID AC   levothyroxine 175 mcg Oral Early Morning   magnesium sulfate 2 g Intravenous Once   metoprolol succinate 200 mg Oral Daily   montelukast 10 mg Oral HS   tiotropium 18 mcg Inhalation Daily     Continuous IV Infusions:    sodium chloride 75 mL/hr Intravenous Continuous     PRN Meds:    ondansetron 4 mg Intravenous Q6H PRN       Network Utilization Review Department  [email or web address]  ATTENTION: Please call with any questions or concerns to [phone] and carefully listen to the prompts so that you are directed to the right person  All voicemails are confidential   America Barron all requests for admission clinical reviews, approved or denied determinations and any other requests to dedicated fax number below belonging to the campus where the patient is receiving treatment    FACILITY NAME UR FAX NUMBER   ADMISSION DENIALS (Administrative/Medical Necessity) [phone]   PARENT CHILD HEALTH (Maternity/NICU/Pediatrics) [phone]   [facility] [place] [place] [address] [phone]   [facility] [phone]   Daniel Cardona [address] [address] [phone]

## 2019-11-14 NOTE — ASSESSMENT & PLAN NOTE
afib with RVR  Has not been able to take her po Toprol or diltiazem due to the vomiting  Will put on Diltiazem IV drip      Restart po meds when she is no longer vomiting

## 2019-11-14 NOTE — H&P
H&P- Chante Liang 1945, 69 y o  female MRN: [id number]    Unit/Bed#: [facility] Encounter: [id number]    Primary Care Provider: Amparo Mehta DO   Date and time admitted to hospital: 11/13/2019  1:52 PM        * Intractable nausea and vomiting  Assessment & Plan  Likely viral gastroenteritis  She also started a new diabetic medication, so perhaps this is causing the nausea  Will give supportive care    Afib [facility])  Assessment & Plan  afib with RVR  Has not been able to take her po Toprol or diltiazem due to the vomiting  Will put on Diltiazem IV drip  Restart po meds when she is no longer vomiting    Diabetes mellitus, type 2 (HCC)  Assessment & Plan  Lab Results   Component Value Date    HGBA1C 9 2 (H) 09/26/2019       No results for input(s): POCGLU in the last 72 hours  Blood Sugar Average: Last 72 hrs:       Stop her new diabetic medication in case it is causing the vomiting    HISS for now        Chief Complaint:   vomiting      History of Present Illness:    Chante Liang is a 69 y o  female who presents with vomiting  She has had intractable nausea/vomting for 2 days  She has been unable to take her medications due to vomiting  No fever nor chills  Vomitus is clear liquid  No blood nor blackness to vomitus  No one else around her has a similar illness  She does not feel like she ate anything that was undercooked  She has no diarrhea  She has no abdominal pain  She did just start a new diabetes medicine, so she wonders if that is causing the nausea         Review of Systems:    Review of Systems   Constitutional: Negative  HENT: Negative  Eyes: Negative  Respiratory: Negative  Cardiovascular: Negative  Gastrointestinal: Positive for nausea and vomiting  Endocrine: Negative  Genitourinary: Negative  Musculoskeletal: Negative  All other systems reviewed and are negative          Past Medical and Surgical History:     Past Medical History: Diagnosis Date    A[facility]fib ([facility] )     Anemia     Chronic kidney disease     Diabetes mellitus ([facility] )     Hypertension        Past Surgical History:   Procedure Laterality Date    CATARACT EXTRACTION, BILATERAL      CHOLECYSTECTOMY      GALLBLADDER SURGERY      HERNIA REPAIR      REPLACEMENT TOTAL KNEE      TONSILLECTOMY           Home Medications:    Prior to Admission medications    Medication Sig Start Date End Date Taking? Authorizing Provider   apixaban (ELIQUIS) 5 mg Take 5 mg by mouth 2 (two) times a day   Yes Historical Provider, MD   diltiazem (TIAZAC) 180 MG 24 hr capsule Take 360 mg by mouth Daily    Yes Historical Provider, MD   levothyroxine (LEVO-T) 175 mcg tablet Take by mouth 11/21/17  Yes Historical Provider, MD   metoprolol succinate (TOPROL-XL) 100 mg 24 hr tablet Take 2 tablets by mouth daily   Yes Historical Provider, MD   montelukast (SINGULAIR) 10 mg tablet Take 10 mg by mouth daily at bedtime   Yes Historical Provider, MD   Multiple Vitamins-Minerals (CENTRUM SILVER) tablet Take 1 tablet by mouth daily   Yes Historical Provider, MD   simvastatin (ZOCOR) 40 mg tablet simvastatin 40 mg tablet   TAKE 1 TABLET BY MOUTH EVERY DAY   Yes Historical Provider, MD   tiotropium (SPIRIVA RESPIMAT) 1 25 MCG/ACT AERS inhaler Inhale 2 puffs daily   Yes Historical Provider, MD   TRADJENTA 5 MG TABS Take 5 mg by mouth daily 7/12/18  Yes Historical Provider, MD   Vitamin D, Ergocalciferol, 2000 units CAPS Take 2,000 capsules by mouth daily   Yes Historical Provider, MD     I have reviewed home medications with patient personally  Allergies:    Allergies   Allergen Reactions    Amoxicillin-Pot Clavulanate Rash and Hives    Bacitracin Itching and Edema     Action Taken: osorio swelling, had to go to er;     Clindamycin      Other reaction(s): Rash         Social History:    Substance Use History:   Social History     Substance and Sexual Activity   Alcohol Use No     Social History     Tobacco Use Smoking Status Former Smoker   Smokeless Tobacco Former User     Social History     Substance and Sexual Activity   Drug Use No         Family History:    non-contributory      Physical Exam:     Vitals:   Blood Pressure: 119/81 (11/13/19 1830)  Pulse: (!) 119 (11/13/19 1830)  Temperature: 98 1 °F (36 7 °C) (11/13/19 1318)  Temp Source: Oral (11/13/19 1318)  Respirations: 20 (11/13/19 1830)  SpO2: 94 % (11/13/19 1830)    Physical Exam   HENT:   Head: Normocephalic and atraumatic  Eyes: Pupils are equal, round, and reactive to light  EOM are normal    Cardiovascular: Normal rate and regular rhythm  Exam reveals no gallop and no friction rub  No murmur heard  Pulmonary/Chest: Effort normal and breath sounds normal  She has no wheezes  She has no rales  Abdominal: Soft  Bowel sounds are normal  There is no tenderness  Musculoskeletal: She exhibits no edema  Nursing note and vitals reviewed       Additional Data:     Lab Results: I have personally reviewed pertinent reports  Results from last 7 days   Lab Units 11/13/19  1454   WBC Thousand/uL 7 68   HEMOGLOBIN g/dL 15 3   HEMATOCRIT % 48 2*   PLATELETS Thousands/uL 227   NEUTROS PCT % 85*   LYMPHS PCT % 9*   MONOS PCT % 6   EOS PCT % 0     Results from last 7 days   Lab Units 11/13/19  1454   POTASSIUM mmol/L 3 9   CHLORIDE mmol/L 97*   CO2 mmol/L 19*   BUN mg/dL 28*   CREATININE mg/dL 1 60*   CALCIUM mg/dL 8 7   ALK PHOS U/L 82   ALT U/L 21   AST U/L 34                     Imaging: I have personally reviewed pertinent reports        CT abdomen pelvis wo contrast   ED Interpretation by Dima Ratliff DO (11/13 1740)   No acute inflammatory changes in the abdomen or pelvis       Hazy central mesenteric edema with multiple subcentimeter mesenteric lymph nodes of a nonspecific degree may indicate a mild chronic mesenteric panniculitis       Dense right adnexal lesion measuring 5 6 cm in keeping with 1 more likely to represent a uterine fibroid than a right adnexal hemorrhagic cyst  This can be better characterized with pelvic ultrasound  Final Result by Nicolasa Fox MD (11/13 1003)      No acute inflammatory changes in the abdomen or pelvis      Hazy central mesenteric edema with multiple subcentimeter mesenteric lymph nodes of a nonspecific degree may indicate a mild chronic mesenteric panniculitis      Dense right adnexal lesion measuring 5 6 cm in keeping with 1 more likely to represent a uterine fibroid than a right adnexal hemorrhagic cyst  This can be better characterized with pelvic ultrasound  The study was marked in [facility] for immediate notification  Workstation performed: [id number]               EKG, Pathology, and Other Studies Reviewed on Admission:   · EKG: afib with RVR  No ST changes      VTE Prophylaxis: Apixaban (Eliquis)        Anticipated Length of Stay:  Patient will be admitted on an Inpatient basis with an anticipated length of stay of  Greater than 2 midnights  Justification for Hospital Stay: bhavana has afib with RVR  She needs a diltiazem drip  Her length of stay will be greater than 2 midnights      Total Time for Visit, including Counseling / Coordination of Care: 45 minutes  Greater than 50% of this total time spent on direct patient counseling and coordination of care  ** Please Note: This note has been constructed using a voice recognition system   **

## 2019-11-14 NOTE — ASSESSMENT & PLAN NOTE
Likely viral gastroenteritis    She also started a new diabetic medication, so perhaps this is causing the nausea  Will give supportive care

## 2019-11-14 NOTE — PROGRESS NOTES
[facility] Internal Medicine Progress Note  Patient: Chuy Raines 69 y o  female   MRN: [id number]  PCP: Delwin Klinefelter, DO  Unit/Bed#: -01 Encounter: [id number]  Date Of Visit: 19    Assessment:    Principal Problem:    Intractable nausea and vomiting  Active Problems:    Diabetes mellitus, type 2 ([facility] )    Afib ([facility] )      Plan:    · 1  Intractable nausea and vomiting- possible secondary to viral gastroenteritis  Will continue supportive care  · 2  Afib with RVR secondary to patient unable to have her medications due to nausea and vomiting  Patient weaned off cardizem drip and placed on PO cardizem  · 3  PRATIMA- on IVF  · 4  Hypothyroidism on levothyroxine  · 5   AFib- on cardizem and eliquis       VTE Pharmacologic Prophylaxis:   Pharmacologic: Apixaban (Eliquis)  Mechanical VTE Prophylaxis in Place: Yes    Patient Centered Rounds: I have performed bedside rounds with nursing staff today  Discussions with Specialists or Other Care Team Provider:     Education and Discussions with Family / Patient:     Time Spent for Care: 20 minutes  More than 50% of total time spent on counseling and coordination of care as described above  Current Length of Stay: 1 day(s)    Current Patient Status: Inpatient   Certification Statement: The patient will continue to require additional inpatient hospital stay due to Afib    Discharge Plan / Estimated Discharge Date: am    Code Status: Level 1 - Full Code      Subjective:   Patient seen and examined at bedside  Patient has no new complaints  Objective:     Vitals:   Temp (24hrs), Av 2 °F (36 8 °C), Min:97 6 °F (36 4 °C), Max:99 1 °F (37 3 °C)    Temp:  [97 6 °F (36 4 °C)-99 1 °F (37 3 °C)] 97 8 °F (36 6 °C)  HR:  [] 88  Resp:  [17-24] 18  BP: ()/(51-81) 127/70  SpO2:  [93 %-100 %] 98 %  Body mass index is 40 75 kg/m²  Input and Output Summary (last 24 hours):        Intake/Output Summary (Last 24 hours) at 2019 1408  Last data filed at 11/13/2019 1555  Gross per 24 hour   Intake 1000 ml   Output    Net 1000 ml       Physical Exam:     Physical Exam   Constitutional: She is oriented to person, place, and time  She appears well-developed and well-nourished  HENT:   Head: Normocephalic and atraumatic  Eyes: Pupils are equal, round, and reactive to light  EOM are normal    Neck: Normal range of motion  Neck supple  No JVD present  No tracheal deviation present  No thyromegaly present  Cardiovascular: Exam reveals no gallop and no friction rub  No murmur heard  Pulmonary/Chest: Effort normal and breath sounds normal  No stridor  No respiratory distress  She has no wheezes  Abdominal: Soft  Bowel sounds are normal  She exhibits no distension  There is no tenderness  There is no guarding  Musculoskeletal: Normal range of motion  She exhibits no edema  Neurological: She is alert and oriented to person, place, and time  Skin: Skin is warm and dry  Vitals reviewed  Additional Data:     Labs:    Results from last 7 days   Lab Units 11/14/19  0519   WBC Thousand/uL 5 64   HEMOGLOBIN g/dL 12 4   HEMATOCRIT % 39 6   PLATELETS Thousands/uL 190   NEUTROS PCT % 73   LYMPHS PCT % 18   MONOS PCT % 9   EOS PCT % 0     Results from last 7 days   Lab Units 11/14/19  0519 11/13/19  1454   POTASSIUM mmol/L 3 4* 3 9   CHLORIDE mmol/L 99* 97*   CO2 mmol/L 15* 19*   BUN mg/dL 28* 28*   CREATININE mg/dL 1 49* 1 60*   CALCIUM mg/dL 7 8* 8 7   ALK PHOS U/L  --  82   ALT U/L  --  21   AST U/L  --  34           * I Have Reviewed All Lab Data Listed Above  * Additional Pertinent Lab Tests Reviewed:  [facility] Admission Reviewed    Imaging:    Imaging Reports Reviewed Today Include:   Imaging Personally Reviewed by Myself Includes:      Recent Cultures (last 7 days):           Last 24 Hours Medication List:     Current Facility-Administered Medications:  apixaban 5 mg Oral BID Kaushik Silva DO    diltiazem 360 mg Oral Daily Enrique Parker MD    insulin lispro 1-6 Units Subcutaneous TID AC Kaushik Prechtel, DO    levothyroxine 175 mcg Oral Early Morning Kaushik Prechtel, DO    metoprolol succinate 200 mg Oral Daily Kaushik Prechtel, DO    montelukast 10 mg Oral HS Kaushik Prechtel, DO    ondansetron 4 mg Intravenous Q6H PRN Kaushik Prechtel, DO    sodium chloride 75 mL/hr Intravenous Continuous Kaushik Prechtel, DO Last Rate: 75 mL/hr (11/13/19 2149)   tiotropium 18 mcg Inhalation Daily Horald Gowers, DO         Today, Patient Was Seen By: Enrique Parker MD    ** Please Note: This note has been constructed using a voice recognition system   **

## 2019-11-14 NOTE — PLAN OF CARE
Problem: Potential for Falls  Goal: Patient will remain free of falls  Description  INTERVENTIONS:  - Assess patient frequently for physical needs  -  Identify cognitive and physical deficits and behaviors that affect risk of falls  -  [place] fall precautions as indicated by assessment   - Educate patient/family on patient safety including physical limitations  - Instruct patient to call for assistance with activity based on assessment  - Modify environment to reduce risk of injury  - Consider OT/PT consult to assist with strengthening/mobility  Outcome: Progressing     Problem: Nutrition/Hydration-ADULT  Goal: Nutrient/Hydration intake appropriate for improving, restoring or maintaining nutritional needs  Description  Monitor and assess patient's nutrition/hydration status for malnutrition  Collaborate with interdisciplinary team and initiate plan and interventions as ordered  Monitor patient's weight and dietary intake as ordered or per policy  Utilize nutrition screening tool and intervene as necessary  Determine patient's food preferences and provide high-protein, high-caloric foods as appropriate       INTERVENTIONS:  - Monitor oral intake, urinary output, labs, and treatment plans  - Assess nutrition and hydration status and recommend course of action  - Evaluate amount of meals eaten  - Assist patient with eating if necessary   - Allow adequate time for meals  - Recommend/ encourage appropriate diets, oral nutritional supplements, and vitamin/mineral supplements  - Order, calculate, and assess calorie counts as needed  - Recommend, monitor, and adjust tube feedings and TPN/PPN based on assessed needs  - Assess need for intravenous fluids  - Provide specific nutrition/hydration education as appropriate  - Include patient/family/caregiver in decisions related to nutrition  Outcome: Progressing     Problem: PAIN - ADULT  Goal: Verbalizes/displays adequate comfort level or baseline comfort level  Description  Interventions:  - Encourage patient to monitor pain and request assistance  - Assess pain using appropriate pain scale  - Administer analgesics based on type and severity of pain and evaluate response  - Implement non-pharmacological measures as appropriate and evaluate response  - Consider cultural and social influences on pain and pain management  - Notify physician/advanced practitioner if interventions unsuccessful or patient reports new pain  Outcome: Progressing     Problem: INFECTION - ADULT  Goal: Absence or prevention of progression during hospitalization  Description  INTERVENTIONS:  - Assess and monitor for signs and symptoms of infection  - Monitor lab/diagnostic results  - Monitor all insertion sites, i e  indwelling lines, tubes, and drains  - Monitor endotracheal if appropriate and nasal secretions for changes in amount and color  - [place] appropriate cooling/warming therapies per order  - Administer medications as ordered  - Instruct and encourage patient and family to use good hand hygiene technique  - Identify and instruct in appropriate isolation precautions for identified infection/condition  Outcome: Progressing  Goal: Absence of fever/infection during neutropenic period  Description  INTERVENTIONS:  - Monitor WBC    Outcome: Progressing     Problem: SAFETY ADULT  Goal: Maintain or return to baseline ADL function  Description  INTERVENTIONS:  -  Assess patient's ability to carry out ADLs; assess patient's baseline for ADL function and identify physical deficits which impact ability to perform ADLs (bathing, care of mouth/teeth, toileting, grooming, dressing, etc )  - Assess/evaluate cause of self-care deficits   - Assess range of motion  - Assess patient's mobility; develop plan if impaired  - Assess patient's need for assistive devices and provide as appropriate  - Encourage maximum independence but intervene and supervise when necessary  - Involve family in performance of ADLs  - Assess for home care needs following discharge   - Consider OT consult to assist with ADL evaluation and planning for discharge  - Provide patient education as appropriate  Outcome: Progressing  Goal: Maintain or return mobility status to optimal level  Description  INTERVENTIONS:  - Assess patient's baseline mobility status (ambulation, transfers, stairs, etc )    - Identify cognitive and physical deficits and behaviors that affect mobility  - Identify mobility aids required to assist with transfers and/or ambulation (gait belt, sit-to-stand, lift, walker, cane, etc )  - [place] fall precautions as indicated by assessment  - Record patient progress and toleration of activity level on Mobility SBAR; progress patient to next Phase/Stage  - Instruct patient to call for assistance with activity based on assessment  - Consider rehabilitation consult to assist with strengthening/weightbearing, etc   Outcome: Progressing     Problem: DISCHARGE PLANNING  Goal: Discharge to home or other facility with appropriate resources  Description  INTERVENTIONS:  - Identify barriers to discharge w/patient and caregiver  - Arrange for needed discharge resources and transportation as appropriate  - Identify discharge learning needs (meds, wound care, etc )  - Arrange for interpretive services to assist at discharge as needed  - Refer to Case Management Department for coordinating discharge planning if the patient needs post-hospital services based on physician/advanced practitioner order or complex needs related to functional status, cognitive ability, or social support system  Outcome: Progressing     Problem: Knowledge Deficit  Goal: Patient/family/caregiver demonstrates understanding of disease process, treatment plan, medications, and discharge instructions  Description  Complete learning assessment and assess knowledge base    Interventions:  - Provide teaching at level of understanding  - Provide teaching via preferred learning methods  Outcome: Progressing     Problem: CARDIOVASCULAR - ADULT  Goal: Maintains optimal cardiac output and hemodynamic stability  Description  INTERVENTIONS:  - Monitor I/O, vital signs and rhythm  - Monitor for S/S and trends of decreased cardiac output  - Administer and titrate ordered vasoactive medications to optimize hemodynamic stability  - Assess quality of pulses, skin color and temperature  - Assess for signs of decreased coronary artery perfusion  - Instruct patient to report change in severity of symptoms  Outcome: Progressing  Goal: Absence of cardiac dysrhythmias or at baseline rhythm  Description  INTERVENTIONS:  - Continuous cardiac monitoring, vital signs, obtain 12 lead EKG if ordered  - Administer antiarrhythmic and heart rate control medications as ordered  - Monitor electrolytes and administer replacement therapy as ordered  Outcome: Progressing     Problem: MUSCULOSKELETAL - ADULT  Goal: Maintain or return mobility to safest level of function  Description  INTERVENTIONS:  - Assess patient's ability to carry out ADLs; assess patient's baseline for ADL function and identify physical deficits which impact ability to perform ADLs (bathing, care of mouth/teeth, toileting, grooming, dressing, etc )  - Assess/evaluate cause of self-care deficits   - Assess range of motion  - Assess patient's mobility  - Assess patient's need for assistive devices and provide as appropriate  - Encourage maximum independence but intervene and supervise when necessary  - Involve family in performance of ADLs  - Assess for home care needs following discharge   - Consider OT consult to assist with ADL evaluation and planning for discharge  - Provide patient education as appropriate  Outcome: Progressing  Goal: Maintain proper alignment of affected body part  Description  INTERVENTIONS:  - Support, maintain and protect limb and body alignment  - Provide patient/ family with appropriate education  Outcome: Progressing

## 2019-11-14 NOTE — UTILIZATION REVIEW
Notification of Inpatient Admission/Inpatient Authorization Request   This is a Notification of Inpatient Admission for [address]  Be advised that this patient was admitted to our facility under Inpatient Status  Contact Pau Josue at [phone] for additional admission information  Mandeep Rees  DEPT DEDICATED Laura Thompson [phone]  Patient Name:   Jeronimo Jimenez   YOB: 1945       [address]   [address]:   [address]   Tax ID: [id number]  NPI: [id number] Attending Provider/NPI: Tremayne Grullon Md [[id number]]   Place of Service Code: 24     Place of Service Name:  [address] [place] [address]   Start Date: 11/13/19 1750     Discharge Date & Time: No discharge date for patient encounter  Type of Admission: Inpatient Status Discharge Disposition   (if discharged): Final discharge disposition not confirmed   Patient Diagnoses: Atrial fibrillation ([address] ) [I48 91]  Dehydration [E86 0]  Abdominal pain [R10 9]  Nausea and vomiting [R11 2]  Uterine mass [N85 8]     Orders: Admission Orders (From admission, onward)     Ordered        11/13/19 1750  Inpatient Admission  Once                    Assigned Utilization Review Contact: Pau Josue  Utilization   Network Utilization Review Department  Phone: [phone]; Fax [phone]  Email: [email or web address]   ATTENTION PAYERS: Please call the assigned Utilization  directly with any questions or concerns ALL voicemails in the department are confidential  Send all requests for admission clinical reviews, approved or denied determinations and any other requests to dedicated fax number belonging to the campus where the patient is receiving treatment

## 2019-11-14 NOTE — ASSESSMENT & PLAN NOTE
Lab Results   Component Value Date    HGBA1C 9 2 (H) 09/26/2019       No results for input(s): POCGLU in the last 72 hours      Blood Sugar Average: Last 72 hrs:       Stop her new diabetic medication in case it is causing the vomiting    HISS for now

## 2019-11-15 VITALS
HEART RATE: 91 BPM | RESPIRATION RATE: 13 BRPM | DIASTOLIC BLOOD PRESSURE: 70 MMHG | BODY MASS INDEX: 40.65 KG/M2 | OXYGEN SATURATION: 98 % | WEIGHT: 238.1 LBS | TEMPERATURE: 97.9 F | HEIGHT: 64 IN | SYSTOLIC BLOOD PRESSURE: 100 MMHG

## 2019-11-15 LAB
BACTERIA UR CULT: NORMAL
GLUCOSE SERPL-MCNC: 168 MG/DL (ref 65–140)

## 2019-11-15 PROCEDURE — 99239 HOSP IP/OBS DSCHRG MGMT >30: CPT | Performed by: INTERNAL MEDICINE

## 2019-11-15 PROCEDURE — 82948 REAGENT STRIP/BLOOD GLUCOSE: CPT

## 2019-11-15 RX ADMIN — APIXABAN 5 MG: 5 TABLET, FILM COATED ORAL at 08:13

## 2019-11-15 RX ADMIN — LEVOTHYROXINE SODIUM 175 MCG: 175 TABLET ORAL at 06:04

## 2019-11-15 RX ADMIN — TIOTROPIUM BROMIDE 18 MCG: 18 CAPSULE ORAL; RESPIRATORY (INHALATION) at 08:13

## 2019-11-15 RX ADMIN — INSULIN LISPRO 1 UNITS: 100 INJECTION, SOLUTION INTRAVENOUS; SUBCUTANEOUS at 08:14

## 2019-11-15 NOTE — PLAN OF CARE
Problem: Potential for Falls  Goal: Patient will remain free of falls  Description  INTERVENTIONS:  - Assess patient frequently for physical needs  -  Identify cognitive and physical deficits and behaviors that affect risk of falls  -  [place] fall precautions as indicated by assessment   - Educate patient/family on patient safety including physical limitations  - Instruct patient to call for assistance with activity based on assessment  - Modify environment to reduce risk of injury  - Consider OT/PT consult to assist with strengthening/mobility  Outcome: Progressing     Problem: Nutrition/Hydration-ADULT  Goal: Nutrient/Hydration intake appropriate for improving, restoring or maintaining nutritional needs  Description  Monitor and assess patient's nutrition/hydration status for malnutrition  Collaborate with interdisciplinary team and initiate plan and interventions as ordered  Monitor patient's weight and dietary intake as ordered or per policy  Utilize nutrition screening tool and intervene as necessary  Determine patient's food preferences and provide high-protein, high-caloric foods as appropriate       INTERVENTIONS:  - Monitor oral intake, urinary output, labs, and treatment plans  - Assess nutrition and hydration status and recommend course of action  - Evaluate amount of meals eaten  - Assist patient with eating if necessary   - Allow adequate time for meals  - Recommend/ encourage appropriate diets, oral nutritional supplements, and vitamin/mineral supplements  - Order, calculate, and assess calorie counts as needed  - Recommend, monitor, and adjust tube feedings and TPN/PPN based on assessed needs  - Assess need for intravenous fluids  - Provide specific nutrition/hydration education as appropriate  - Include patient/family/caregiver in decisions related to nutrition  Outcome: Progressing     Problem: PAIN - ADULT  Goal: Verbalizes/displays adequate comfort level or baseline comfort level  Description  Interventions:  - Encourage patient to monitor pain and request assistance  - Assess pain using appropriate pain scale  - Administer analgesics based on type and severity of pain and evaluate response  - Implement non-pharmacological measures as appropriate and evaluate response  - Consider cultural and social influences on pain and pain management  - Notify physician/advanced practitioner if interventions unsuccessful or patient reports new pain  Outcome: Progressing     Problem: INFECTION - ADULT  Goal: Absence or prevention of progression during hospitalization  Description  INTERVENTIONS:  - Assess and monitor for signs and symptoms of infection  - Monitor lab/diagnostic results  - Monitor all insertion sites, i e  indwelling lines, tubes, and drains  - Monitor endotracheal if appropriate and nasal secretions for changes in amount and color  - [place] appropriate cooling/warming therapies per order  - Administer medications as ordered  - Instruct and encourage patient and family to use good hand hygiene technique  - Identify and instruct in appropriate isolation precautions for identified infection/condition  Outcome: Progressing  Goal: Absence of fever/infection during neutropenic period  Description  INTERVENTIONS:  - Monitor WBC    Outcome: Progressing     Problem: SAFETY ADULT  Goal: Maintain or return to baseline ADL function  Description  INTERVENTIONS:  -  Assess patient's ability to carry out ADLs; assess patient's baseline for ADL function and identify physical deficits which impact ability to perform ADLs (bathing, care of mouth/teeth, toileting, grooming, dressing, etc )  - Assess/evaluate cause of self-care deficits   - Assess range of motion  - Assess patient's mobility; develop plan if impaired  - Assess patient's need for assistive devices and provide as appropriate  - Encourage maximum independence but intervene and supervise when necessary  - Involve family in performance of ADLs  - Assess for home care needs following discharge   - Consider OT consult to assist with ADL evaluation and planning for discharge  - Provide patient education as appropriate  Outcome: Progressing  Goal: Maintain or return mobility status to optimal level  Description  INTERVENTIONS:  - Assess patient's baseline mobility status (ambulation, transfers, stairs, etc )    - Identify cognitive and physical deficits and behaviors that affect mobility  - Identify mobility aids required to assist with transfers and/or ambulation (gait belt, sit-to-stand, lift, walker, cane, etc )  - [place] fall precautions as indicated by assessment  - Record patient progress and toleration of activity level on Mobility SBAR; progress patient to next Phase/Stage  - Instruct patient to call for assistance with activity based on assessment  - Consider rehabilitation consult to assist with strengthening/weightbearing, etc   Outcome: Progressing     Problem: DISCHARGE PLANNING  Goal: Discharge to home or other facility with appropriate resources  Description  INTERVENTIONS:  - Identify barriers to discharge w/patient and caregiver  - Arrange for needed discharge resources and transportation as appropriate  - Identify discharge learning needs (meds, wound care, etc )  - Arrange for interpretive services to assist at discharge as needed  - Refer to Case Management Department for coordinating discharge planning if the patient needs post-hospital services based on physician/advanced practitioner order or complex needs related to functional status, cognitive ability, or social support system  Outcome: Progressing     Problem: Knowledge Deficit  Goal: Patient/family/caregiver demonstrates understanding of disease process, treatment plan, medications, and discharge instructions  Description  Complete learning assessment and assess knowledge base    Interventions:  - Provide teaching at level of understanding  - Provide teaching via preferred learning methods  Outcome: Progressing     Problem: CARDIOVASCULAR - ADULT  Goal: Maintains optimal cardiac output and hemodynamic stability  Description  INTERVENTIONS:  - Monitor I/O, vital signs and rhythm  - Monitor for S/S and trends of decreased cardiac output  - Administer and titrate ordered vasoactive medications to optimize hemodynamic stability  - Assess quality of pulses, skin color and temperature  - Assess for signs of decreased coronary artery perfusion  - Instruct patient to report change in severity of symptoms  Outcome: Progressing  Goal: Absence of cardiac dysrhythmias or at baseline rhythm  Description  INTERVENTIONS:  - Continuous cardiac monitoring, vital signs, obtain 12 lead EKG if ordered  - Administer antiarrhythmic and heart rate control medications as ordered  - Monitor electrolytes and administer replacement therapy as ordered  Outcome: Progressing     Problem: MUSCULOSKELETAL - ADULT  Goal: Maintain or return mobility to safest level of function  Description  INTERVENTIONS:  - Assess patient's ability to carry out ADLs; assess patient's baseline for ADL function and identify physical deficits which impact ability to perform ADLs (bathing, care of mouth/teeth, toileting, grooming, dressing, etc )  - Assess/evaluate cause of self-care deficits   - Assess range of motion  - Assess patient's mobility  - Assess patient's need for assistive devices and provide as appropriate  - Encourage maximum independence but intervene and supervise when necessary  - Involve family in performance of ADLs  - Assess for home care needs following discharge   - Consider OT consult to assist with ADL evaluation and planning for discharge  - Provide patient education as appropriate  Outcome: Progressing  Goal: Maintain proper alignment of affected body part  Description  INTERVENTIONS:  - Support, maintain and protect limb and body alignment  - Provide patient/ family with appropriate education  Outcome: Progressing

## 2019-11-15 NOTE — DISCHARGE SUMMARY
Discharge Summary - [facility] Internal Medicine    Patient Information: Gisella Chamberlain 69 y o  female MRN: [id number]  Unit/Bed#: -01 Encounter: [id number]    Discharging Physician / Practitioner: Marcella Perla MD  PCP: Rhea Chaidez DO  Admission Date: 11/13/2019  Discharge Date: 11/15/19    Disposition:     Home    Reason for Admission: Nausea and vomiting, Afib    Discharge Diagnoses:     Principal Problem:    Intractable nausea and vomiting  Active Problems:    Diabetes mellitus, type 2 ([facility] [address] )    Afib ([facility] [address] )  Resolved Problems:    * No resolved hospital problems  *      Consultations During Hospital Stay:  · none    Procedures Performed:     ·     Significant Findings / Test Results:     ·     Incidental Findings:   ·      Test Results Pending at Discharge (will require follow up):   ·      Outpatient Tests Requested:  ·     Complications:  None    History of Present Illness:     Gisella Chamberlain is a 69 y o  female who presents with vomiting  She has had intractable nausea/vomting for 2 days  She has been unable to take her medications due to vomiting  No fever nor chills  Vomitus is clear liquid  No blood nor blackness to vomitus  No one else around her has a similar illness  She does not feel like she ate anything that was undercooked  She has no diarrhea  She has no abdominal pain  She did just start a new diabetes medicine, so she wonders if that is causing the nausea  [facility] Hospital Course:     Gisella Chamberlain is a 69 y o  female patient who originally presented to the hospital on 11/13/2019 due to complaints of increased nausea and vomiting  Patient mostlikely had viral gastroenteritis and was given supportive care  She also had afib with RVR and was placed on cardizem drip and was slowly weaned back to her normal dose of oral cardizem at home  Patient was not able to take her cardizem for 4 days due to nausea and vomiting  Patient afib resolved   She is currently hemodynamically stable and will be discharged home  She is to followup with her PCP as outpatient  Condition at Discharge: stable     Discharge Day Visit / Exam:     Subjective:  Patient seen and examined at bedside  Patient has no new complaints  Vitals: Blood Pressure: 100/70 (11/15/19 0717)  Pulse: 91 (11/15/19 0717)  Temperature: 97 9 °F (36 6 °C) (11/15/19 0717)  Temp Source: Oral (11/15/19 1417)  Respirations: 13 (11/15/19 0717)  Height: 5' 4" (162 6 cm) (11/14/19 1452)  Weight - Scale: 108 kg (238 lb 1 6 oz) (11/14/19 1452)  SpO2: 98 % (11/15/19 0717)  Exam:   Physical Exam   Constitutional: She is oriented to person, place, and time  She appears well-developed and well-nourished  HENT:   Head: Normocephalic and atraumatic  Eyes: Pupils are equal, round, and reactive to light  EOM are normal    Neck: Normal range of motion  Neck supple  No tracheal deviation present  No thyromegaly present  Cardiovascular: Exam reveals no gallop and no friction rub  No murmur heard  Pulmonary/Chest: Effort normal and breath sounds normal  No stridor  No respiratory distress  She has no wheezes  Abdominal: Soft  Bowel sounds are normal  She exhibits no distension  There is no tenderness  There is no guarding  Musculoskeletal: Normal range of motion  She exhibits no edema  Neurological: She is alert and oriented to person, place, and time  Skin: Skin is warm and dry  Vitals reviewed  Discussion with Family:     Discharge instructions/Information to patient and family:   See after visit summary for information provided to patient and family  Provisions for Follow-Up Care:  See after visit summary for information related to follow-up care and any pertinent home health orders  Planned Readmission: none     Discharge Statement:  I spent 50 minutes discharging the patient  This time was spent on the day of discharge  I had direct contact with the patient on the day of discharge   Greater than 50% of the total time was spent examining patient, answering all patient questions, arranging and discussing plan of care with patient as well as directly providing post-discharge instructions  Additional time then spent on discharge activities  Discharge Medications:  See after visit summary for reconciled discharge medications provided to patient and family        ** Please Note: This note has been constructed using a voice recognition system **

## 2020-05-08 ENCOUNTER — TELEPHONE (OUTPATIENT)
Dept: NEPHROLOGY | Facility: CLINIC | Age: 75
End: 2020-05-08

## 2020-05-08 DIAGNOSIS — N18.30 STAGE III CHRONIC KIDNEY DISEASE (HCC): Primary | ICD-10-CM

## 2020-06-03 LAB
25(OH)D3 SERPL-MCNC: 58 NG/ML (ref 30–100)
APPEARANCE UR: CLEAR
BASOPHILS # BLD AUTO: 38 CELLS/UL (ref 0–200)
BASOPHILS NFR BLD AUTO: 0.5 %
BILIRUB UR QL STRIP: NEGATIVE
BUN SERPL-MCNC: 16 MG/DL (ref 7–25)
BUN/CREAT SERPL: 14 (CALC) (ref 6–22)
CALCIUM SERPL-MCNC: 10.2 MG/DL (ref 8.6–10.4)
CALCIUM SERPL-MCNC: 10.2 MG/DL (ref 8.6–10.4)
CHLORIDE SERPL-SCNC: 101 MMOL/L (ref 98–110)
CO2 SERPL-SCNC: 32 MMOL/L (ref 20–32)
COLOR UR: YELLOW
CREAT SERPL-MCNC: 1.17 MG/DL (ref 0.6–0.93)
CREAT UR-MCNC: 35 MG/DL (ref 20–275)
EOSINOPHIL # BLD AUTO: 220 CELLS/UL (ref 15–500)
EOSINOPHIL NFR BLD AUTO: 2.9 %
ERYTHROCYTE [DISTWIDTH] IN BLOOD BY AUTOMATED COUNT: 13.1 % (ref 11–15)
GLUCOSE SERPL-MCNC: 162 MG/DL (ref 65–99)
GLUCOSE UR QL STRIP: ABNORMAL
HCT VFR BLD AUTO: 44.5 % (ref 35–45)
HGB BLD-MCNC: 15 G/DL (ref 11.7–15.5)
HGB UR QL STRIP: NEGATIVE
KETONES UR QL STRIP: ABNORMAL
LEUKOCYTE ESTERASE UR QL STRIP: NEGATIVE
LYMPHOCYTES # BLD AUTO: 2075 CELLS/UL (ref 850–3900)
LYMPHOCYTES NFR BLD AUTO: 27.3 %
MCH RBC QN AUTO: 29.6 PG (ref 27–33)
MCHC RBC AUTO-ENTMCNC: 33.7 G/DL (ref 32–36)
MCV RBC AUTO: 87.9 FL (ref 80–100)
MONOCYTES # BLD AUTO: 433 CELLS/UL (ref 200–950)
MONOCYTES NFR BLD AUTO: 5.7 %
NEUTROPHILS # BLD AUTO: 4834 CELLS/UL (ref 1500–7800)
NEUTROPHILS NFR BLD AUTO: 63.6 %
NITRITE UR QL STRIP: NEGATIVE
PH UR STRIP: 5.5 [PH] (ref 5–8)
PHOSPHATE SERPL-MCNC: 4.7 MG/DL (ref 2.1–4.3)
PLATELET # BLD AUTO: 259 THOUSAND/UL (ref 140–400)
PMV BLD REES-ECKER: 11.4 FL (ref 7.5–12.5)
POTASSIUM SERPL-SCNC: 4 MMOL/L (ref 3.5–5.3)
PROT UR QL STRIP: NEGATIVE
PROT UR-MCNC: 5 MG/DL (ref 5–24)
PROT/CREAT UR: 0.14 MG/MG CREAT (ref 0.02–0.16)
PROT/CREAT UR: 143 MG/G CREAT (ref 21–161)
PTH-INTACT SERPL-MCNC: 33 PG/ML (ref 14–64)
RBC # BLD AUTO: 5.06 MILLION/UL (ref 3.8–5.1)
SL AMB EGFR AFRICAN AMERICAN: 54 ML/MIN/1.73M2
SL AMB EGFR NON AFRICAN AMERICAN: 46 ML/MIN/1.73M2
SODIUM SERPL-SCNC: 141 MMOL/L (ref 135–146)
SP GR UR STRIP: 1.01 (ref 1–1.03)
WBC # BLD AUTO: 7.6 THOUSAND/UL (ref 3.8–10.8)

## 2020-06-16 ENCOUNTER — TELEPHONE (OUTPATIENT)
Dept: NEPHROLOGY | Facility: CLINIC | Age: 75
End: 2020-06-16

## 2020-06-17 ENCOUNTER — TELEMEDICINE (OUTPATIENT)
Dept: NEPHROLOGY | Facility: CLINIC | Age: 75
End: 2020-06-17
Payer: MEDICARE

## 2020-06-17 ENCOUNTER — TELEPHONE (OUTPATIENT)
Dept: NEPHROLOGY | Facility: CLINIC | Age: 75
End: 2020-06-17

## 2020-06-17 VITALS — HEIGHT: 64 IN | WEIGHT: 159 LBS | RESPIRATION RATE: 16 BRPM | BODY MASS INDEX: 27.14 KG/M2

## 2020-06-17 DIAGNOSIS — I10 BENIGN ESSENTIAL HYPERTENSION: Primary | ICD-10-CM

## 2020-06-17 DIAGNOSIS — N18.30 STAGE III CHRONIC KIDNEY DISEASE (HCC): ICD-10-CM

## 2020-06-17 DIAGNOSIS — E11.22 TYPE 2 DIABETES MELLITUS WITH STAGE 3 CHRONIC KIDNEY DISEASE, WITHOUT LONG-TERM CURRENT USE OF INSULIN (HCC): ICD-10-CM

## 2020-06-17 DIAGNOSIS — N18.30 TYPE 2 DIABETES MELLITUS WITH STAGE 3 CHRONIC KIDNEY DISEASE, WITHOUT LONG-TERM CURRENT USE OF INSULIN (HCC): ICD-10-CM

## 2020-06-17 DIAGNOSIS — M89.9 CHRONIC KIDNEY DISEASE-MINERAL AND BONE DISORDER: ICD-10-CM

## 2020-06-17 DIAGNOSIS — I48.91 ATRIAL FIBRILLATION, UNSPECIFIED TYPE (HCC): ICD-10-CM

## 2020-06-17 DIAGNOSIS — N18.9 CHRONIC KIDNEY DISEASE-MINERAL AND BONE DISORDER: ICD-10-CM

## 2020-06-17 DIAGNOSIS — E83.9 CHRONIC KIDNEY DISEASE-MINERAL AND BONE DISORDER: ICD-10-CM

## 2020-06-17 PROCEDURE — 99442 PR PHYS/QHP TELEPHONE EVALUATION 11-20 MIN: CPT | Performed by: INTERNAL MEDICINE

## 2020-06-17 RX ORDER — FUROSEMIDE 20 MG/1
TABLET ORAL
COMMUNITY
Start: 2016-08-04

## 2020-09-14 ENCOUNTER — TELEPHONE (OUTPATIENT)
Dept: NEPHROLOGY | Facility: CLINIC | Age: 75
End: 2020-09-14

## 2020-09-14 NOTE — TELEPHONE ENCOUNTER
Patient of Dr Rafia Cabral called to reschedule her appointment from 9/21/2020 to 9/29/2020 with Dr Rafia Cabral  The patient understood and was okay with the new day and time of her appointment   Umer Joseph,

## 2020-09-18 LAB
CREAT ?TM UR-SCNC: 22.2 UMOL/L
EXT PROTEIN URINE: 7.1
PROT/CREAT UR: 0.32 MG/G{CREAT}

## 2020-09-24 ENCOUNTER — DOCUMENTATION (OUTPATIENT)
Dept: NEPHROLOGY | Facility: CLINIC | Age: 75
End: 2020-09-24

## 2020-09-24 LAB
CREAT ?TM UR-SCNC: 22.2 UMOL/L
EXT BLOOD, UA: NORMAL
EXT DIFF-ABS BASOPHILS: 0.1
EXT DIFF-ABS EOSINOPHILS: 0.1
EXT DIFF-ABS LYMPHOCYTES: 1.5
EXT DIFF-ABS MONOCYTES: 0.4
EXT DIFF-ABS NEUTROPHILS: 2.8
EXT GLUCOSE BLD: 134
EXT GLUCOSE, UA: NORMAL
EXT KETONES: NORMAL
EXT NITRITE, UA: NORMAL
EXT PH, UA: 6
EXT PROTEIN URINE: 7.1
EXT PROTEIN, UA: NORMAL
EXT SPECIFIC GRAVITY, UA: 1.01
EXTERNAL ANION GAP: 10
EXTERNAL BACTERIA (UA): NORMAL
EXTERNAL BUN: 19
EXTERNAL CALCIUM: 10
EXTERNAL CHLORIDE: 105
EXTERNAL CO2: 27
EXTERNAL CREATININE: 1.15
EXTERNAL EGFR: 47
EXTERNAL HEMATOCRIT: 41 %
EXTERNAL HEMOGLOBIN: 14.3 G/DL
EXTERNAL MCV: 88
EXTERNAL PHOSPHORUS: 3.9
EXTERNAL PLATELET COUNT: 202 K/ΜL
EXTERNAL POTASSIUM: 4
EXTERNAL PTH: 24.6
EXTERNAL RBC (UA): NORMAL
EXTERNAL RBC: 4.68
EXTERNAL RDW: 14.1
EXTERNAL SODIUM: 142
EXTERNAL VITAMIN D,25: 60
EXTERNAL WBC (UA): NORMAL
EXTERNAL WBC: 4.9
PROT/CREAT UR: 0.32 MG/G{CREAT}
WBC # BLD EST: 25 10*3/UL

## 2020-09-29 ENCOUNTER — OFFICE VISIT (OUTPATIENT)
Dept: NEPHROLOGY | Facility: CLINIC | Age: 75
End: 2020-09-29
Payer: COMMERCIAL

## 2020-09-29 VITALS
RESPIRATION RATE: 16 BRPM | DIASTOLIC BLOOD PRESSURE: 70 MMHG | TEMPERATURE: 97.7 F | HEART RATE: 68 BPM | HEIGHT: 65 IN | SYSTOLIC BLOOD PRESSURE: 110 MMHG | WEIGHT: 134.6 LBS | BODY MASS INDEX: 22.42 KG/M2

## 2020-09-29 DIAGNOSIS — I10 BENIGN ESSENTIAL HYPERTENSION: ICD-10-CM

## 2020-09-29 DIAGNOSIS — M89.9 CHRONIC KIDNEY DISEASE-MINERAL AND BONE DISORDER: ICD-10-CM

## 2020-09-29 DIAGNOSIS — E83.9 CHRONIC KIDNEY DISEASE-MINERAL AND BONE DISORDER: ICD-10-CM

## 2020-09-29 DIAGNOSIS — N18.30 TYPE 2 DIABETES MELLITUS WITH STAGE 3 CHRONIC KIDNEY DISEASE, WITHOUT LONG-TERM CURRENT USE OF INSULIN (HCC): ICD-10-CM

## 2020-09-29 DIAGNOSIS — N18.9 CHRONIC KIDNEY DISEASE-MINERAL AND BONE DISORDER: ICD-10-CM

## 2020-09-29 DIAGNOSIS — I48.91 ATRIAL FIBRILLATION, UNSPECIFIED TYPE (HCC): ICD-10-CM

## 2020-09-29 DIAGNOSIS — N18.30 STAGE III CHRONIC KIDNEY DISEASE (HCC): Primary | ICD-10-CM

## 2020-09-29 DIAGNOSIS — E11.22 TYPE 2 DIABETES MELLITUS WITH STAGE 3 CHRONIC KIDNEY DISEASE, WITHOUT LONG-TERM CURRENT USE OF INSULIN (HCC): ICD-10-CM

## 2020-09-29 PROCEDURE — 99214 OFFICE O/P EST MOD 30 MIN: CPT | Performed by: INTERNAL MEDICINE

## 2020-09-29 NOTE — ASSESSMENT & PLAN NOTE
Rate is very well control  She is on beta-blocker along with diltiazem and on Eliquis    Being monitored by cardiologist

## 2020-09-29 NOTE — PROGRESS NOTES
NEPHROLOGY OFFICE FOLLOW UP  Juani Newberry 70 y o  female MRN: [id number]    Encounter: [id number] 9/29/2020    REASON FOR VISIT: Juani Newberry is a 70 y o  female who is here on 9/29/2020 for Chronic Kidney Disease and Follow-up    HPI:    James Waldrop came in today for follow-up of CKD  [de-identified] year woman who is doing quite well  She lost quite a bit of weight with diet and exercise  Denies any complaint    No chest pain no palpitation or shortness of breath    No nausea no vomiting      REVIEW OF SYSTEMS:    Review of Systems   Constitutional: Negative for activity change and fatigue  HENT: Negative for congestion and ear discharge  Eyes: Negative for photophobia and pain  Respiratory: Negative for apnea and choking  Cardiovascular: Negative for chest pain and palpitations  Gastrointestinal: Negative for abdominal distention and blood in stool  Endocrine: Negative for heat intolerance and polyphagia  Genitourinary: Negative for flank pain and urgency  Musculoskeletal: Negative for neck pain and neck stiffness  Skin: Negative for color change and wound  Allergic/Immunologic: Negative for food allergies and immunocompromised state  Neurological: Negative for seizures and facial asymmetry  Hematological: Negative for adenopathy  Does not bruise/bleed easily  Psychiatric/Behavioral: Negative for self-injury and suicidal ideas           PAST MEDICAL HISTORY:  Past Medical History:   Diagnosis Date    A[facility]fib ([facility] )     Anemia     Chronic kidney disease     Diabetes mellitus ([facility] )     Hypertension        PAST SURGICAL HISTORY:  Past Surgical History:   Procedure Laterality Date    CATARACT EXTRACTION, BILATERAL      CHOLECYSTECTOMY      GALLBLADDER SURGERY      HERNIA REPAIR      REPLACEMENT TOTAL KNEE      TONSILLECTOMY         SOCIAL HISTORY:  Social History     Substance and Sexual Activity   Alcohol Use Never    Frequency: Never     Social History     Substance and Sexual Activity   Drug Use No     Social History     Tobacco Use   Smoking Status Former Smoker   Smokeless Tobacco Former User       FAMILY HISTORY:  Family History   Problem Relation Age of Onset    No Known Problems Mother     Heart disease Father     No Known Problems Sister        MEDICATIONS:    Current Outpatient Medications:     apixaban (ELIQUIS) 5 mg, Take 5 mg by mouth 2 (two) times a day, Disp: , Rfl:     diltiazem (TIAZAC) 180 MG 24 hr capsule, Take 360 mg by mouth Daily , Disp: , Rfl:     Empagliflozin (Jardiance) 25 MG TABS, Take 25 mg by mouth daily , Disp: , Rfl:     furosemide (LASIX) 20 mg tablet, furosemide 20 mg tablet  TAKE 1 TABLET(S) EVERY DAY BY ORAL ROUTE , Disp: , Rfl:     levothyroxine (LEVO-T) 175 mcg tablet, Take by mouth 0 5 pill daily, Disp: , Rfl:     metoprolol succinate (TOPROL-XL) 100 mg 24 hr tablet, Take 2 tablets by mouth daily, Disp: , Rfl:     montelukast (SINGULAIR) 10 mg tablet, Take 10 mg by mouth daily at bedtime, Disp: , Rfl:     Multiple Vitamins-Minerals (CENTRUM SILVER) tablet, Take 1 tablet by mouth daily, Disp: , Rfl:     simvastatin (ZOCOR) 40 mg tablet, simvastatin 40 mg tablet  TAKE 1 TABLET BY MOUTH EVERY DAY, Disp: , Rfl:     Vitamin D, Ergocalciferol, 2000 units CAPS, Take 2,000 capsules by mouth daily, Disp: , Rfl:     PHYSICAL EXAM:  Vitals:    09/29/20 0940   BP: 110/70   BP Location: Right arm   Patient Position: Sitting   Pulse: 68   Resp: 16   Temp: 97 7 °F (36 5 °C)   TempSrc: Temporal   Weight: 61 1 kg (134 lb 9 6 oz)   Height: 5' 4 5" (1 638 m)     Body mass index is 22 75 kg/m²  Physical Exam  Constitutional:       General: She is not in acute distress  Appearance: She is well-developed  HENT:      Head: Normocephalic  Eyes:      General: No scleral icterus  Conjunctiva/sclera: Conjunctivae normal    Neck:      Musculoskeletal: Neck supple  Vascular: No JVD  Cardiovascular:      Rate and Rhythm: Normal rate  Heart sounds: Normal heart sounds  Pulmonary:      Effort: Pulmonary effort is normal       Breath sounds: No wheezing  Abdominal:      Palpations: Abdomen is soft  Tenderness: There is no abdominal tenderness  Musculoskeletal: Normal range of motion  Skin:     General: Skin is warm  Findings: No rash  Neurological:      Mental Status: She is alert and oriented to person, place, and time     Psychiatric:         Behavior: Behavior normal          LAB RESULTS:  Results for orders placed or performed in visit on 02/79/57   Basic metabolic panel   Result Value Ref Range    SODIUM 142     POTASSIUM 4 0     CHLORIDE 105     CO2 27     BUN 19     CREATININE 1 15     Glucose 134     EXTERNAL CALCIUM 10 0     ANION GAP 10     EXTERNAL EGFR 47    Protein / creatinine ratio, urine   Result Value Ref Range    PROTEIN UA 7 1     EXT Creatinine Urine 22 2     EXTERNAL Ur Prot/Creat Ratio 0 32    Phosphorus   Result Value Ref Range    EXTERNAL PHOSPHORUS 3 9    Urinalysis with microscopic   Result Value Ref Range    Spec Grav, UA (Ref:1 003-1 030) 1 007     Glucose, UA (Ref: Negative) >zq=022     Ketones, UA (Ref: Negative) neg     Blood, UA neg     Nitrite, UA (Ref: Negative) neg      Leukocytes, UA (Ref: Negative) 25     pH, UA (Ref: 4 5-8 0) 6 0     Protein, UA (Ref: Negative) neg     RBC, UA rare     EXTERNAL WBC, UA 0-2     EXTERNAL BACTERIA, UA few    Vitamin D 25 hydroxy   Result Value Ref Range    EXTERNAL VITAMIN D,25 60    CBC and differential   Result Value Ref Range    WBC 4 9     External RBC 4 68     External Hemoglobin 14 3 g/dL    Hematocrit 41 %    MCV 88     External RDW 14 1     External Platelets 362 K/µL    Abs Neutrophils 2 8     Abs Lymphocytes 1 5     External Abs Monocytes  0 4     External Abs Eosinophils 0 1     External Abs Basophils  0 1    PTH, intact   Result Value Ref Range    PTH 24 6        ASSESSMENT and PLAN:      Stage III chronic kidney disease  Patient renal function is stable at GFR of 47  She is doing quite well  Asymptomatic and progressive nature of kidney disease discussed with the patient  Advised hydration and avoidance of any nephrotoxic medicine    Benign essential hypertension  Blood pressure is very well control  She is on beta-blocker but she also atrial fibrillation for which she is on Cardizem  She is being closely monitored by cardiologist    Chronic kidney disease-mineral and bone disorder  PTH and phosphorus along with vitamin-D are within acceptable range  Will continue to monitor    Afib ([address] )  Rate is very well control  She is on beta-blocker along with diltiazem and on Eliquis  Being monitored by cardiologist    I will see her back in 6 months  I discussed blood work with her at length  I am very happy with her loss of weight so advised to keep eat steady now  Will get blood work and urine test before her next visit          Portions of the record may have been created with voice recognition software  Occasional wrong word or "sound a like" substitutions may have occurred due to the inherent limitations of voice recognition software  Read the chart carefully and recognize, using context, where substitutions have occurred  If you have any questions, please contact the dictating provider

## 2020-09-29 NOTE — ASSESSMENT & PLAN NOTE
Blood pressure is very well control  She is on beta-blocker but she also atrial fibrillation for which she is on Cardizem    She is being closely monitored by cardiologist

## 2020-09-29 NOTE — PATIENT INSTRUCTIONS
Chronic Kidney Disease   AMBULATORY CARE:   Chronic kidney disease (CKD)  is the gradual and permanent loss of kidney function  Normally, the kidneys remove fluid, chemicals, and waste from your blood  These wastes are turned into urine by your kidneys  CKD may worsen over time and lead to kidney failure  Common symptoms include the following:   · Changes in how often you need to urinate    · Swelling in your arms, legs, or feet    · Shortness of breath    · Fatigue or weakness    · Bad or bitter taste in your mouth    · Nausea, vomiting, or loss of appetite  Seek care immediately if:   · You are confused and very drowsy  · You have a seizure  · You have shortness of breath  Contact your healthcare provider if:   · You suddenly gain or lose more weight than your healthcare provider has told you is okay  · You have itchy skin or a rash  · You urinate more or less than you normally do  · You have blood in your urine  · You have nausea and repeated vomiting  · You have fatigue or muscle weakness  · You have hiccups that will not stop  · You have questions or concerns about your condition or care  Treatment for CKD:  Medicines may be given to decrease blood pressure and get rid of extra fluid  You may also receive medicine to manage health conditions that may occur with CKD  Dialysis is a treatment to remove chemicals and waste from your blood when your kidneys can no longer do this  Surgery may be needed to create an arteriovenous fistula (AVF) in your arm or insert a catheter into your abdomen so that you can receive dialysis  A kidney transplant may be done if your CKD becomes severe  Manage CKD:   · Maintain a healthy weight  Ask your healthcare provider how much you should weigh  Ask him to help you create a weight loss plan if you are overweight  · Exercise 30 to 60 minutes a day, 4 to 7 times a week, or as directed  Ask about the best exercise plan for you   Regular exercise can help you manage CKD, high blood pressure, and diabetes  · Follow your healthcare provider's advice about what to eat and drink  He may tell you to eat food low in sodium (salt), potassium, phosphorus, or protein  You may need to see a dietitian if you need help planning meals  Ask how much liquid to drink each day and which liquids are best for you  · Limit alcohol  Ask how much alcohol is safe for you to drink  A drink of alcohol is 12 ounces of beer, 5 ounces of wine, or 1½ ounces of liquor  · Do not smoke  Nicotine and other chemicals in cigarettes and cigars can cause lung and kidney damage  Ask your healthcare provider for information if you currently smoke and need help to quit  E-cigarettes or smokeless tobacco still contain nicotine  Talk to your healthcare provider before you use these products  · Ask your healthcare provider if you need vaccines  Infections such as pneumonia, influenza, and hepatitis can be more harmful or more likely to occur in a person who has CKD  Vaccines reduce your risk of infection with these viruses  Follow up with your healthcare provider as directed:  Write down your questions so you remember to ask them during your visits  © 2017 [employer] Information is for End User's use only and may not be sold, redistributed or otherwise used for commercial purposes  All illustrations and images included in CareNotes® are the copyrighted property of [employer]  or [employer]  The above information is an  only  It is not intended as medical advice for individual conditions or treatments  Talk to your doctor, nurse or pharmacist before following any medical regimen to see if it is safe and effective for you

## 2020-09-29 NOTE — ASSESSMENT & PLAN NOTE
Patient renal function is stable at GFR of 47  She is doing quite well  Asymptomatic and progressive nature of kidney disease discussed with the patient    Advised hydration and avoidance of any nephrotoxic medicine

## 2021-03-11 ENCOUNTER — TELEPHONE (OUTPATIENT)
Dept: NEPHROLOGY | Facility: CLINIC | Age: 76
End: 2021-03-11

## 2021-03-11 NOTE — TELEPHONE ENCOUNTER
Patient called to reschedule her appointment for Monday 3/29/2021 6 month follow because she is watching her grandchildren  Patient appointment was reschedule to 5/13/2021  The patient understood and was okay with it   Jerald Gaston,

## 2021-05-12 ENCOUNTER — TELEPHONE (OUTPATIENT)
Dept: NEPHROLOGY | Facility: CLINIC | Age: 76
End: 2021-05-12

## 2021-05-12 NOTE — TELEPHONE ENCOUNTER
I called Guy Martin @ [phone] and spoke to [facility] () to check eligible for the patient and as of  5/12/2021 the patient has current active coverage as of 1/1/2021  [address] [employer] H[employer] Plan is Primary and is the only insurance on file  This plan does not require a HMO referral from PCP Dr Todd Mar on file   The reference number for this call is: [id number]  Kristopher Baltazar,

## 2021-05-13 ENCOUNTER — OFFICE VISIT (OUTPATIENT)
Dept: NEPHROLOGY | Facility: CLINIC | Age: 76
End: 2021-05-13
Payer: COMMERCIAL

## 2021-05-13 VITALS
BODY MASS INDEX: 22.86 KG/M2 | TEMPERATURE: 96.5 F | WEIGHT: 137.2 LBS | HEIGHT: 65 IN | DIASTOLIC BLOOD PRESSURE: 80 MMHG | SYSTOLIC BLOOD PRESSURE: 110 MMHG | HEART RATE: 68 BPM | RESPIRATION RATE: 16 BRPM

## 2021-05-13 DIAGNOSIS — E83.9 CHRONIC KIDNEY DISEASE-MINERAL AND BONE DISORDER: ICD-10-CM

## 2021-05-13 DIAGNOSIS — I10 BENIGN ESSENTIAL HYPERTENSION: ICD-10-CM

## 2021-05-13 DIAGNOSIS — N18.32 TYPE 2 DIABETES MELLITUS WITH STAGE 3B CHRONIC KIDNEY DISEASE, WITHOUT LONG-TERM CURRENT USE OF INSULIN (HCC): ICD-10-CM

## 2021-05-13 DIAGNOSIS — N18.9 CHRONIC KIDNEY DISEASE-MINERAL AND BONE DISORDER: ICD-10-CM

## 2021-05-13 DIAGNOSIS — N18.32 STAGE 3B CHRONIC KIDNEY DISEASE (HCC): Primary | ICD-10-CM

## 2021-05-13 DIAGNOSIS — M89.9 CHRONIC KIDNEY DISEASE-MINERAL AND BONE DISORDER: ICD-10-CM

## 2021-05-13 DIAGNOSIS — E11.22 TYPE 2 DIABETES MELLITUS WITH STAGE 3B CHRONIC KIDNEY DISEASE, WITHOUT LONG-TERM CURRENT USE OF INSULIN (HCC): ICD-10-CM

## 2021-05-13 DIAGNOSIS — I10 ESSENTIAL HYPERTENSION: ICD-10-CM

## 2021-05-13 DIAGNOSIS — I48.91 ATRIAL FIBRILLATION, UNSPECIFIED TYPE (HCC): ICD-10-CM

## 2021-05-13 PROCEDURE — 99214 OFFICE O/P EST MOD 30 MIN: CPT | Performed by: INTERNAL MEDICINE

## 2021-05-13 RX ORDER — ALENDRONATE SODIUM 35 MG/1
35 TABLET ORAL WEEKLY
COMMUNITY
Start: 2021-04-23

## 2021-05-13 NOTE — PROGRESS NOTES
NEPHROLOGY OFFICE FOLLOW UP  Hernan Tao 78 y o  female MRN: [id number]    Encounter: [id number] 5/13/2021    REASON FOR VISIT: Hernan Tao is a 78 y o  female who is here on 5/13/2021 for Chronic Kidney Disease and Follow-up    HPI:     Omkar Montana came in today for follow-up of CKD  [de-identified] woman with CKD stage 3 who also the diabetes and atrial fibrillation  Overall she is doing quite well  She seems to quite active and losing weight with diet and exercise     Denies any chest pain palpitation or shortness of breath     Denies any joint pain and denies taking nonsteroidal pain killer      REVIEW OF SYSTEMS:    Review of Systems   Constitutional: Negative for activity change and fatigue  HENT: Negative for congestion and ear discharge  Eyes: Negative for photophobia and pain  Respiratory: Negative for apnea, choking and shortness of breath  Cardiovascular: Negative for chest pain and palpitations  Gastrointestinal: Negative for abdominal distention, abdominal pain and blood in stool  Endocrine: Negative for heat intolerance and polyphagia  Genitourinary: Negative for difficulty urinating, flank pain and urgency  Musculoskeletal: Positive for back pain  Negative for neck pain and neck stiffness  Skin: Negative for color change and wound  Allergic/Immunologic: Negative for food allergies and immunocompromised state  Neurological: Negative for seizures and facial asymmetry  Hematological: Negative for adenopathy  Does not bruise/bleed easily  Psychiatric/Behavioral: Negative for self-injury and suicidal ideas           PAST MEDICAL HISTORY:  Past Medical History:   Diagnosis Date    A-fib ([facility] )     Anemia     Chronic kidney disease     Diabetes mellitus ([facility] )     Hypertension        PAST SURGICAL HISTORY:  Past Surgical History:   Procedure Laterality Date    CATARACT EXTRACTION, BILATERAL      CHOLECYSTECTOMY      GALLBLADDER SURGERY      HERNIA REPAIR      REPLACEMENT TOTAL KNEE      TONSILLECTOMY         SOCIAL HISTORY:  Social History     Substance and Sexual Activity   Alcohol Use Never    Frequency: Never     Social History     Substance and Sexual Activity   Drug Use No     Social History     Tobacco Use   Smoking Status Former Smoker   Smokeless Tobacco Former User       FAMILY HISTORY:  Family History   Problem Relation Age of Onset    No Known Problems Mother     Heart disease Father     No Known Problems Sister        MEDICATIONS:    Current Outpatient Medications:     alendronate (FOSAMAX) 35 mg tablet, 35 mg once a week , Disp: , Rfl:     apixaban (ELIQUIS) 5 mg, Take 5 mg by mouth 2 (two) times a day, Disp: , Rfl:     diltiazem (TIAZAC) 180 MG 24 hr capsule, Take 360 mg by mouth Daily , Disp: , Rfl:     Empagliflozin (Jardiance) 25 MG TABS, Take 25 mg by mouth daily , Disp: , Rfl:     furosemide (LASIX) 20 mg tablet, furosemide 20 mg tablet  TAKE 1 TABLET(S) EVERY DAY BY ORAL ROUTE , Disp: , Rfl:     levothyroxine (LEVO-T) 175 mcg tablet, Take by mouth 0 5 pill daily, Disp: , Rfl:     metoprolol succinate (TOPROL-XL) 100 mg 24 hr tablet, Take 1 tablet by mouth daily , Disp: , Rfl:     montelukast (SINGULAIR) 10 mg tablet, Take 10 mg by mouth daily at bedtime, Disp: , Rfl:     Multiple Vitamins-Minerals (CENTRUM SILVER) tablet, Take 1 tablet by mouth daily, Disp: , Rfl:     simvastatin (ZOCOR) 40 mg tablet, simvastatin 40 mg tablet  TAKE 1 TABLET BY MOUTH EVERY DAY, Disp: , Rfl:     Vitamin D, Ergocalciferol, 2000 units CAPS, Take 2,000 capsules by mouth daily, Disp: , Rfl:     PHYSICAL EXAM:  Vitals:    05/13/21 0859   BP: 110/80   BP Location: Right arm   Patient Position: Sitting   Pulse: 68   Resp: 16   Temp: (!) 96 5 °F (35 8 °C)   TempSrc: Temporal   Weight: 62 2 kg (137 lb 3 2 oz)   Height: 5' 4 5" (1 638 m)     Body mass index is 23 19 kg/m²  Physical Exam  Constitutional:       General: She is not in acute distress  Appearance: She is well-developed  HENT:      Head: Normocephalic  Mouth/Throat:      Mouth: Mucous membranes are moist    Eyes:      General: No scleral icterus  Conjunctiva/sclera: Conjunctivae normal    Neck:      Musculoskeletal: Normal range of motion and neck supple  Vascular: No JVD  Cardiovascular:      Rate and Rhythm: Normal rate  Rhythm irregular  Heart sounds: Normal heart sounds  Pulmonary:      Effort: Pulmonary effort is normal       Breath sounds: Normal breath sounds  No wheezing  Abdominal:      Palpations: Abdomen is soft  Tenderness: There is no abdominal tenderness  Musculoskeletal: Normal range of motion  Skin:     General: Skin is warm  Findings: No rash  Neurological:      General: No focal deficit present  Mental Status: She is alert and oriented to person, place, and time     Psychiatric:         Behavior: Behavior normal          LAB RESULTS:  Results for orders placed or performed in visit on 16/30/25   Basic metabolic panel   Result Value Ref Range    SODIUM 142     POTASSIUM 4 0     CHLORIDE 105     CO2 27     BUN 19     CREATININE 1 15     Glucose 134     EXTERNAL CALCIUM 10 0     ANION GAP 10     EXTERNAL EGFR 47    Protein / creatinine ratio, urine   Result Value Ref Range    PROTEIN UA 7 1     EXT Creatinine Urine 22 2     EXTERNAL Ur Prot/Creat Ratio 0 32    Phosphorus   Result Value Ref Range    EXTERNAL PHOSPHORUS 3 9    Urinalysis with microscopic   Result Value Ref Range    Spec Grav, UA (Ref:1 003-1 030) 1 007     Glucose, UA (Ref: Negative) >tc=930     Ketones, UA (Ref: Negative) neg     Blood, UA neg     Nitrite, UA (Ref: Negative) neg      Leukocytes, UA (Ref: Negative) 25     pH, UA (Ref: 4 5-8 0) 6 0     Protein, UA (Ref: Negative) neg     RBC, UA rare     EXTERNAL WBC, UA 0-2     EXTERNAL BACTERIA, UA few    Vitamin D 25 hydroxy   Result Value Ref Range    EXTERNAL VITAMIN D,25 60    CBC and differential   Result Value Ref Range    WBC 4 9     External RBC 4 68     External Hemoglobin 14 3 g/dL    Hematocrit 41 %    MCV 88     External RDW 14 1     External Platelets 868 K/µL    Abs Neutrophils 2 8     Abs Lymphocytes 1 5     External Abs Monocytes  0 4     External Abs Eosinophils 0 1     External Abs Basophils  0 1    PTH, intact   Result Value Ref Range    PTH 24 6        ASSESSMENT and PLAN:      Stage III chronic kidney disease  Lab Results   Component Value Date    EGFR 47 09/18/2020    EGFR 35 11/14/2019    EGFR 32 11/13/2019    CREATININE 1 15 09/18/2020    CREATININE 1 17 (H) 06/02/2020    CREATININE 1 49 (H) 11/14/2019    overall seems to be stable though there is some fluctuation  Importance of hydration and avoiding nephrotoxic medicine discussed with her    Chronic kidney disease-mineral and bone disorder  Lab Results   Component Value Date    EGFR 47 09/18/2020    EGFR 35 11/14/2019    EGFR 32 11/13/2019    CREATININE 1 15 09/18/2020    CREATININE 1 17 (H) 06/02/2020    CREATININE 1 49 (H) 11/14/2019    PTH and phosphorus along with vitamin-D are within acceptable range and will continue to monitor    Diabetes mellitus, type 2 (HCC)    Lab Results   Component Value Date    HGBA1C 7 7 (H) 04/09/2021    not well control  Importance of diabetic control and kidney disease discussed with her  She is going to discussed with primary doctor    Hypertension   Blood pressure is very well control  Afib ([address] )    Rate is very well controlled with diltiazem which will continue  She is on anticoagulation and being monitored by cardiologist         discussed  everything with the patient at length  I will see her back in 6 months  Will get blood and urine test before that visit      Portions of the record may have been created with voice recognition software  Occasional wrong word or "sound a like" substitutions may have occurred due to the inherent limitations of voice recognition software   Read the chart carefully and recognize, using context, where substitutions have occurred  If you have any questions, please contact the dictating provider

## 2021-05-13 NOTE — ASSESSMENT & PLAN NOTE
Lab Results   Component Value Date    EGFR 47 09/18/2020    EGFR 35 11/14/2019    EGFR 32 11/13/2019    CREATININE 1 15 09/18/2020    CREATININE 1 17 (H) 06/02/2020    CREATININE 1 49 (H) 11/14/2019    overall seems to be stable though there is some fluctuation    Importance of hydration and avoiding nephrotoxic medicine discussed with her

## 2021-05-13 NOTE — ASSESSMENT & PLAN NOTE
Rate is very well controlled with diltiazem which will continue    She is on anticoagulation and being monitored by cardiologist

## 2021-05-13 NOTE — ASSESSMENT & PLAN NOTE
Lab Results   Component Value Date    EGFR 47 09/18/2020    EGFR 35 11/14/2019    EGFR 32 11/13/2019    CREATININE 1 15 09/18/2020    CREATININE 1 17 (H) 06/02/2020    CREATININE 1 49 (H) 11/14/2019    PTH and phosphorus along with vitamin-D are within acceptable range and will continue to monitor

## 2021-05-13 NOTE — PATIENT INSTRUCTIONS

## 2021-05-13 NOTE — ASSESSMENT & PLAN NOTE
Lab Results   Component Value Date    HGBA1C 7 7 (H) 04/09/2021    not well control  Importance of diabetic control and kidney disease discussed with her    She is going to discussed with primary doctor

## 2021-11-08 ENCOUNTER — TELEPHONE (OUTPATIENT)
Dept: NEPHROLOGY | Facility: CLINIC | Age: 76
End: 2021-11-08

## 2021-12-30 ENCOUNTER — TELEPHONE (OUTPATIENT)
Dept: NEPHROLOGY | Facility: CLINIC | Age: 76
End: 2021-12-30

## 2022-03-31 ENCOUNTER — TELEPHONE (OUTPATIENT)
Dept: NEPHROLOGY | Facility: CLINIC | Age: 77
End: 2022-03-31

## 2022-04-01 ENCOUNTER — OFFICE VISIT (OUTPATIENT)
Dept: NEPHROLOGY | Facility: CLINIC | Age: 77
End: 2022-04-01
Payer: COMMERCIAL

## 2022-04-01 VITALS
WEIGHT: 149 LBS | BODY MASS INDEX: 24.83 KG/M2 | RESPIRATION RATE: 16 BRPM | OXYGEN SATURATION: 96 % | DIASTOLIC BLOOD PRESSURE: 70 MMHG | HEIGHT: 65 IN | SYSTOLIC BLOOD PRESSURE: 130 MMHG | TEMPERATURE: 97.9 F | HEART RATE: 76 BPM

## 2022-04-01 DIAGNOSIS — M89.9 CHRONIC KIDNEY DISEASE-MINERAL AND BONE DISORDER: ICD-10-CM

## 2022-04-01 DIAGNOSIS — N18.9 CHRONIC KIDNEY DISEASE-MINERAL AND BONE DISORDER: ICD-10-CM

## 2022-04-01 DIAGNOSIS — N18.32 TYPE 2 DIABETES MELLITUS WITH STAGE 3B CHRONIC KIDNEY DISEASE, WITHOUT LONG-TERM CURRENT USE OF INSULIN (HCC): ICD-10-CM

## 2022-04-01 DIAGNOSIS — N18.32 STAGE 3B CHRONIC KIDNEY DISEASE (HCC): Primary | ICD-10-CM

## 2022-04-01 DIAGNOSIS — E83.9 CHRONIC KIDNEY DISEASE-MINERAL AND BONE DISORDER: ICD-10-CM

## 2022-04-01 DIAGNOSIS — I10 BENIGN ESSENTIAL HYPERTENSION: ICD-10-CM

## 2022-04-01 DIAGNOSIS — E11.22 TYPE 2 DIABETES MELLITUS WITH STAGE 3B CHRONIC KIDNEY DISEASE, WITHOUT LONG-TERM CURRENT USE OF INSULIN (HCC): ICD-10-CM

## 2022-04-01 PROCEDURE — 99214 OFFICE O/P EST MOD 30 MIN: CPT | Performed by: INTERNAL MEDICINE

## 2022-04-01 RX ORDER — EMPAGLIFLOZIN AND LINAGLIPTIN 25; 5 MG/1; MG/1
1 TABLET, FILM COATED ORAL DAILY
COMMUNITY
Start: 2022-02-22

## 2022-04-01 NOTE — PATIENT INSTRUCTIONS
Chronic Kidney Disease   AMBULATORY CARE:   Chronic kidney disease (CKD)  is the gradual and permanent loss of kidney function  Normally, the kidneys remove fluid, chemicals, and waste from your blood  These wastes are turned into urine by your kidneys  CKD may worsen over time and lead to kidney failure  Common signs and symptoms include the following:   · Changes in how often you need to urinate    · Swelling in your arms, legs, or feet    · Shortness of breath    · Fatigue or weakness    · Bad or bitter taste in your mouth    · Nausea, vomiting, or loss of appetite    Call your local emergency number (911 in the [address] [facility]) if:   · You have a seizure  · You have shortness of breath  Seek care immediately if:   · You are confused and very drowsy  Call your doctor or nephrologist if:   · You suddenly gain or lose more weight than your healthcare provider has told you is okay  · You have itchy skin or a rash  · You urinate more or less than you normally do  · You have blood in your urine  · You have nausea and are vomiting  · You have fatigue or muscle weakness  · You have hiccups that will not stop  · You have questions or concerns about your condition or care  How CKD is diagnosed:  CKD has 5 stages  Your healthcare provider will use results from the following tests to find the stage of CKD you have:  · Blood and urine tests  show how well your kidneys are working  They may also show the cause of your CKD  · Ultrasound, CT scan, or MRI  pictures may be used to check your kidneys  You may be given contrast liquid to help your kidneys show up better in the pictures  Tell the healthcare provider if you have ever had an allergic reaction to contrast liquid  Do not enter the MRI room with anything metal  Metal can cause serious injury  Tell the healthcare provider if you have any metal in or on your body      · A biopsy  is a procedure to remove a small piece of tissue from your kidney  It is done to find the cause of your CKD  Treatment  can help control signs and symptoms, and prevent a worse stage of CKD  Your care team may include specialists, such as a dietitian or a heart specialist  This depends on the stage of your CKD and if you have other health conditions to manage  Healthcare providers will work with you to create a plan based on your decisions for treatment  Your treatment plan may include any of the following:  · Medicines  may be given to decrease your blood pressure and get rid of extra fluid  You may also receive medicine to manage health conditions that may occur with CKD, such as anemia, diabetes, and heart disease  · Dialysis  is a treatment to remove chemicals and waste from your blood when your kidneys can no longer do this  · Surgery  may be needed to create an arteriovenous fistula (AVF) in your arm or insert a catheter into your abdomen  This is done so you can receive dialysis  · A kidney transplant  may be done if your CKD becomes severe  What you can do to manage CKD: Management may include making some lifestyle changes  Tell your healthcare provider if you have any concerns about being able to make changes  He or she can help you find solutions, including working with specialists  Ask for help creating a plan to break large goals into smaller steps  Your plan may include any of the following:  · Manage other health conditions  Your healthcare provider will work with you to make a care plan that meets your needs  You will be checked regularly for heart disease or other conditions that can make CKD worse, such as diabetes  Your blood pressure will be closely monitored  You will also get a target blood pressure and help making a plan to reach your target  This may include taking your blood pressure at home  · Maintain a healthy weight  Your weight and body mass index (BMI) will be checked regularly   BMI helps find if your weight is healthy for your height  Your healthcare provider will use other tests to check your muscle and protein levels  Extra weight can strain your kidneys  A low weight or low muscle mass can make you feel more tired  You may have trouble doing your daily activities  Ask your provider what a healthy weight is for you  He or she can help you create a plan to lose or gain weight safely, if needed  The plan may include keeping a food diary  This is a list of foods and liquids you have each day  Your provider will use the diary to help you make changes, if needed  Changes are based on your health and any other conditions you have, such as diabetes  · Create an exercise plan  Regular exercise can help you manage CKD, high blood pressure, and diabetes  Exercise also helps control weight  Your provider can help you create exercise goals and a plan to reach those goals  For example, your goal may be to exercise for 30 minutes in a day  Your plan can include breaking exercise into 10 minute sessions, 3 times during the day  · Create a healthy eating plan  Your provider may tell you to eat food low in potassium, phosphorus, or protein  Your provider may also recommend vitamin or mineral supplements  Do not take any supplements without talking to your provider  A dietitian can help you plan meals if needed  Ask how much liquid to drink each day and which liquids are best for you  · Limit sodium (salt) as directed  You may need to limit sodium to less than 2,300 milligrams (mg) each day  Ask your dietitian or healthcare provider how much sodium you can have each day  The amount depends on your stage of kidney disease  Table salt, canned foods, soups, salted snacks, and processed meats, like deli meats and sausage, are high in sodium  Your provider or a dietitian can show you how to read food labels for sodium  · Limit alcohol as directed  Alcohol can cause fluid retention and can affect your kidneys   Ask how much alcohol is safe for you  A drink of alcohol is 12 ounces of beer, 5 ounces of wine, or 1½ ounces of liquor  · Do not smoke  Nicotine and other chemicals in cigarettes and cigars can cause kidney damage  Ask your provider for information if you currently smoke and need help to quit  E-cigarettes or smokeless tobacco still contain nicotine  Talk to your provider before you use these products  · Ask about over-the-counter medicines  Medicines such as NSAIDs and laxatives may harm your kidneys  Some cough and cold medicines can raise your blood pressure  Always ask if a medicine is safe before you take it  · Ask about vaccines you may need  CKD can increase your risk for infections such as pneumonia, influenza, and hepatitis  Vaccines lower your risk for infection  Your healthcare provider will tell you which vaccines you need and when to get them  Follow up with your doctor or nephrologist as directed: You will need to return for tests to monitor your kidney and nerve function, and your parathyroid hormone level  Your medicines may be changed, based on certain test results  Write down your questions so you remember to ask them during your visits  © Copyright [employer] 2022 Information is for End User's use only and may not be sold, redistributed or otherwise used for commercial purposes  All illustrations and images included in CareNotes® are the copyrighted property of [employer]  or [employer]  The above information is an  only  It is not intended as medical advice for individual conditions or treatments  Talk to your doctor, nurse or pharmacist before following any medical regimen to see if it is safe and effective for you

## 2022-04-01 NOTE — ASSESSMENT & PLAN NOTE
Lab Results   Component Value Date    EGFR 47 09/18/2020    EGFR 35 11/14/2019    EGFR 32 11/13/2019    CREATININE 1 15 09/18/2020    CREATININE 1 17 (H) 06/02/2020    CREATININE 1 49 (H) 11/14/2019   Patient renal function is stable overall  Likely etiology is diabetic nephropathy    Advised hydration and avoiding any nephrotoxic medication

## 2022-04-01 NOTE — PROGRESS NOTES
NEPHROLOGY OFFICE FOLLOW UP  Lee Ann Guerin 78 y o  female MRN: [id number]    Encounter: [id number] 4/1/2022    REASON FOR VISIT: Lee Ann Guerin is a 78 y o  female who is here on 4/1/2022 for Chronic Kidney Disease and Follow-up    HPI:    Nia Blackmon came in today for the follow-up of CKD  [de-identified] woman who is doing well overall  Denies any acute complaint     Denies any chest pain or palpitation    No shortness of breath     Nothing much new happen since last visit  REVIEW OF SYSTEMS:    Review of Systems   Constitutional: Negative for activity change and fatigue  HENT: Negative for congestion and ear discharge  Eyes: Negative for photophobia and pain  Respiratory: Negative for apnea and choking  Cardiovascular: Negative for chest pain and palpitations  Gastrointestinal: Negative for abdominal distention and blood in stool  Endocrine: Negative for heat intolerance and polyphagia  Genitourinary: Negative for flank pain and urgency  Musculoskeletal: Negative for neck pain and neck stiffness  Skin: Negative for color change and wound  Allergic/Immunologic: Negative for food allergies and immunocompromised state  Neurological: Negative for seizures and facial asymmetry  Hematological: Negative for adenopathy  Does not bruise/bleed easily  Psychiatric/Behavioral: Negative for self-injury and suicidal ideas           PAST MEDICAL HISTORY:  Past Medical History:   Diagnosis Date    A-fib ([facility] )     Anemia     Chronic kidney disease     Diabetes mellitus ([facility] )     Hypertension        PAST SURGICAL HISTORY:  Past Surgical History:   Procedure Laterality Date    CATARACT EXTRACTION, BILATERAL      CHOLECYSTECTOMY      GALLBLADDER SURGERY      HERNIA REPAIR      REPLACEMENT TOTAL KNEE      TONSILLECTOMY         SOCIAL HISTORY:  Social History     Substance and Sexual Activity   Alcohol Use Never     Social History     Substance and Sexual Activity   Drug Use No     Social History     Tobacco Use   Smoking Status Former Smoker   Smokeless Tobacco Former User       FAMILY HISTORY:  Family History   Problem Relation Age of Onset    No Known Problems Mother     Heart disease Father     No Known Problems Sister        MEDICATIONS:    Current Outpatient Medications:     alendronate (FOSAMAX) 35 mg tablet, 35 mg once a week , Disp: , Rfl:     apixaban (ELIQUIS) 5 mg, Take 5 mg by mouth 2 (two) times a day, Disp: , Rfl:     diltiazem (TIAZAC) 180 MG 24 hr capsule, Take 360 mg by mouth Daily , Disp: , Rfl:     Empagliflozin-linaGLIPtin (Glyxambi) 25-5 MG TABS, Take 1 tablet by mouth daily, Disp: , Rfl:     furosemide (LASIX) 20 mg tablet, furosemide 20 mg tablet  TAKE 1 TABLET(S) EVERY DAY BY ORAL ROUTE , Disp: , Rfl:     levothyroxine (LEVO-T) 175 mcg tablet, Take 150 mcg by mouth 0 5 pill daily , Disp: , Rfl:     metoprolol succinate (TOPROL-XL) 100 mg 24 hr tablet, Take 1 tablet by mouth daily , Disp: , Rfl:     montelukast (SINGULAIR) 10 mg tablet, Take 10 mg by mouth daily at bedtime, Disp: , Rfl:     Multiple Vitamins-Minerals (CENTRUM SILVER) tablet, Take 1 tablet by mouth daily, Disp: , Rfl:     simvastatin (ZOCOR) 40 mg tablet, simvastatin 40 mg tablet  TAKE 1 TABLET BY MOUTH EVERY DAY, Disp: , Rfl:     Vitamin D, Ergocalciferol, 2000 units CAPS, Take 2,000 capsules by mouth daily, Disp: , Rfl:     Empagliflozin (Jardiance) 25 MG TABS, Take 25 mg by mouth daily  (Patient not taking: Reported on 4/1/2022 ), Disp: , Rfl:     PHYSICAL EXAM:  Vitals:    04/01/22 1001   BP: 130/70   BP Location: Right arm   Patient Position: Sitting   Pulse: 76   Resp: 16   Temp: 97 9 °F (36 6 °C)   TempSrc: Temporal   SpO2: 96%   Weight: 67 6 kg (149 lb)   Height: 5' 4 5" (1 638 m)     Body mass index is 25 18 kg/m²  Physical Exam  Constitutional:       General: She is not in acute distress  Appearance: Normal appearance  She is well-developed  HENT:      Head: Normocephalic  Eyes:      General: No scleral icterus  Conjunctiva/sclera: Conjunctivae normal    Neck:      Vascular: No JVD  Cardiovascular:      Rate and Rhythm: Normal rate  Heart sounds: Normal heart sounds  Pulmonary:      Effort: Pulmonary effort is normal       Breath sounds: Normal breath sounds  No wheezing  Abdominal:      General: Bowel sounds are normal       Palpations: Abdomen is soft  Tenderness: There is no abdominal tenderness  Musculoskeletal:         General: No swelling  Normal range of motion  Cervical back: Normal range of motion and neck supple  Skin:     General: Skin is warm  Findings: No rash  Neurological:      General: No focal deficit present  Mental Status: She is alert and oriented to person, place, and time  Psychiatric:         Behavior: Behavior normal          LAB RESULTS:  Results for orders placed or performed in visit on 03/21/22   Protein / creatinine ratio, urine   Result Value Ref Range    PROTEIN UA 5 6     EXT Creatinine Urine 26 4     EXTERNAL Ur Prot/Creat Ratio 0 21        ASSESSMENT and PLAN:      Stage III chronic kidney disease  Lab Results   Component Value Date    EGFR 47 09/18/2020    EGFR 35 11/14/2019    EGFR 32 11/13/2019    CREATININE 1 15 09/18/2020    CREATININE 1 17 (H) 06/02/2020    CREATININE 1 49 (H) 11/14/2019   Patient renal function is stable overall  Likely etiology is diabetic nephropathy  Advised hydration and avoiding any nephrotoxic medication    Chronic kidney disease-mineral and bone disorder  Lab Results   Component Value Date    EGFR 47 09/18/2020    EGFR 35 11/14/2019    EGFR 32 11/13/2019    CREATININE 1 15 09/18/2020    CREATININE 1 17 (H) 06/02/2020    CREATININE 1 49 (H) 11/14/2019   PTH and phosphorus along with vitamin-D are within acceptable range and will continue to monitor    Diabetes mellitus, type 2 (HCC)    Lab Results   Component Value Date    HGBA1C 8 3 (H) 02/21/2022   Very well control    Being monitored by primary doctor  Medicine has been changed recently  Importance of diabetic control and effect on kidney disease discussed with the patient    Benign essential hypertension  Quite well control  Patient is on ARB blocker which I will continue      Everything discussed with patient at length  I will see her back in 6 months  Will get blood and urine test before that visit        Portions of the record may have been created with voice recognition software  Occasional wrong word or "sound a like" substitutions may have occurred due to the inherent limitations of voice recognition software  Read the chart carefully and recognize, using context, where substitutions have occurred  If you have any questions, please contact the dictating provider

## 2022-04-01 NOTE — ASSESSMENT & PLAN NOTE
Lab Results   Component Value Date    HGBA1C 8 3 (H) 02/21/2022   Very well control  Being monitored by primary doctor  Medicine has been changed recently    Importance of diabetic control and effect on kidney disease discussed with the patient

## 2022-09-12 ENCOUNTER — TELEPHONE (OUTPATIENT)
Dept: NEPHROLOGY | Facility: CLINIC | Age: 77
End: 2022-09-12

## 2022-10-10 ENCOUNTER — TELEPHONE (OUTPATIENT)
Dept: NEPHROLOGY | Facility: CLINIC | Age: 77
End: 2022-10-10

## 2022-10-12 LAB
CREAT ?TM UR-SCNC: 15.7 UMOL/L
EXT PROTEIN URINE: <5

## 2022-10-14 ENCOUNTER — TELEPHONE (OUTPATIENT)
Dept: NEPHROLOGY | Facility: CLINIC | Age: 77
End: 2022-10-14

## 2022-10-17 ENCOUNTER — OFFICE VISIT (OUTPATIENT)
Dept: NEPHROLOGY | Facility: CLINIC | Age: 77
End: 2022-10-17
Payer: COMMERCIAL

## 2022-10-17 VITALS
RESPIRATION RATE: 16 BRPM | HEART RATE: 57 BPM | SYSTOLIC BLOOD PRESSURE: 120 MMHG | DIASTOLIC BLOOD PRESSURE: 70 MMHG | HEIGHT: 65 IN | WEIGHT: 147.6 LBS | BODY MASS INDEX: 24.59 KG/M2 | OXYGEN SATURATION: 99 % | TEMPERATURE: 97.8 F

## 2022-10-17 DIAGNOSIS — N18.32 STAGE 3B CHRONIC KIDNEY DISEASE (HCC): Primary | ICD-10-CM

## 2022-10-17 DIAGNOSIS — E87.5 HYPERKALEMIA: ICD-10-CM

## 2022-10-17 DIAGNOSIS — M89.9 CHRONIC KIDNEY DISEASE-MINERAL AND BONE DISORDER: ICD-10-CM

## 2022-10-17 DIAGNOSIS — N18.9 CHRONIC KIDNEY DISEASE-MINERAL AND BONE DISORDER: ICD-10-CM

## 2022-10-17 DIAGNOSIS — E11.22 TYPE 2 DIABETES MELLITUS WITH STAGE 3B CHRONIC KIDNEY DISEASE, WITHOUT LONG-TERM CURRENT USE OF INSULIN (HCC): ICD-10-CM

## 2022-10-17 DIAGNOSIS — N18.32 TYPE 2 DIABETES MELLITUS WITH STAGE 3B CHRONIC KIDNEY DISEASE, WITHOUT LONG-TERM CURRENT USE OF INSULIN (HCC): ICD-10-CM

## 2022-10-17 DIAGNOSIS — E83.9 CHRONIC KIDNEY DISEASE-MINERAL AND BONE DISORDER: ICD-10-CM

## 2022-10-17 DIAGNOSIS — I10 BENIGN ESSENTIAL HYPERTENSION: ICD-10-CM

## 2022-10-17 PROCEDURE — 99214 OFFICE O/P EST MOD 30 MIN: CPT | Performed by: INTERNAL MEDICINE

## 2022-10-17 NOTE — PROGRESS NOTES
NEPHROLOGY OFFICE FOLLOW UP  Jordon Maurice 78 y o  female MRN: [id number]    Encounter: [id number] 10/17/2022    REASON FOR VISIT: Jordon Maurice is a 78 y o  female who is here on 10/17/2022 for Chronic Kidney Disease and Follow-up    HPI:    Doroteoanna marie Notice came in today for follow-up of CKD overall she is doing quite well  Denies any acute complaint     No chest pain no palpitation or shortness of breath     No nausea no vomiting     No abdominal discomfort or urinary complaint      REVIEW OF SYSTEMS:    Review of Systems   Constitutional: Negative for activity change and fatigue  HENT: Negative for congestion and ear discharge  Eyes: Negative for photophobia and pain  Respiratory: Negative for apnea and choking  Cardiovascular: Negative for chest pain and palpitations  Gastrointestinal: Negative for abdominal distention and blood in stool  Endocrine: Negative for heat intolerance and polyphagia  Genitourinary: Negative for flank pain and urgency  Musculoskeletal: Negative for neck pain and neck stiffness  Skin: Negative for color change and wound  Allergic/Immunologic: Negative for food allergies and immunocompromised state  Neurological: Negative for seizures and facial asymmetry  Hematological: Negative for adenopathy  Does not bruise/bleed easily  Psychiatric/Behavioral: Negative for self-injury and suicidal ideas           PAST MEDICAL HISTORY:  Past Medical History:   Diagnosis Date   • A-fib ([facility] )    • Anemia    • Chronic kidney disease    • Diabetes mellitus ([facility] )    • Hypertension        PAST SURGICAL HISTORY:  Past Surgical History:   Procedure Laterality Date   • CATARACT EXTRACTION, BILATERAL     • CHOLECYSTECTOMY     • GALLBLADDER SURGERY     • HERNIA REPAIR     • REPLACEMENT TOTAL KNEE     • TONSILLECTOMY         SOCIAL HISTORY:  Social History     Substance and Sexual Activity   Alcohol Use Never     Social History     Substance and Sexual Activity   Drug Use No Social History     Tobacco Use   Smoking Status Former Smoker   Smokeless Tobacco Former User       FAMILY HISTORY:  Family History   Problem Relation Age of Onset   • No Known Problems Mother    • Heart disease Father    • No Known Problems Sister        MEDICATIONS:    Current Outpatient Medications:   •  alendronate (FOSAMAX) 35 mg tablet, 35 mg once a week , Disp: , Rfl:   •  apixaban (ELIQUIS) 5 mg, Take 5 mg by mouth 2 (two) times a day, Disp: , Rfl:   •  diltiazem (TIAZAC) 180 MG 24 hr capsule, Take 360 mg by mouth Daily , Disp: , Rfl:   •  Empagliflozin-linaGLIPtin (Glyxambi) 25-5 MG TABS, Take 1 tablet by mouth daily, Disp: , Rfl:   •  furosemide (LASIX) 20 mg tablet, 10 mg, Disp: , Rfl:   •  levothyroxine 175 mcg tablet, Take 150 mcg by mouth 0 5 pill daily , Disp: , Rfl:   •  metoprolol succinate (TOPROL-XL) 100 mg 24 hr tablet, Take 1 tablet by mouth 2 (two) times a day, Disp: , Rfl:   •  montelukast (SINGULAIR) 10 mg tablet, Take 10 mg by mouth daily at bedtime, Disp: , Rfl:   •  Multiple Vitamins-Minerals (CENTRUM SILVER) tablet, Take 1 tablet by mouth daily, Disp: , Rfl:   •  simvastatin (ZOCOR) 40 mg tablet, simvastatin 40 mg tablet  TAKE 1 TABLET BY MOUTH EVERY DAY, Disp: , Rfl:   •  Vitamin D, Ergocalciferol, 2000 units CAPS, Take 2,000 capsules by mouth daily Patient takes 2,000 units every other day, Disp: , Rfl:   •  Empagliflozin 25 MG TABS, Take 25 mg by mouth daily  (Patient not taking: Reported on 10/17/2022), Disp: , Rfl:     PHYSICAL EXAM:  Vitals:    10/17/22 1305   BP: 120/70   BP Location: Right arm   Patient Position: Sitting   Pulse: 57   Resp: 16   Temp: 97 8 °F (36 6 °C)   TempSrc: Temporal   SpO2: 99%   Weight: 67 kg (147 lb 9 6 oz)   Height: 5' 4 5" (1 638 m)     Body mass index is 24 94 kg/m²  Physical Exam  Constitutional:       General: She is not in acute distress  Appearance: She is well-developed  HENT:      Head: Normocephalic     Eyes:      General: No scleral icterus  Conjunctiva/sclera: Conjunctivae normal    Neck:      Vascular: No JVD  Cardiovascular:      Rate and Rhythm: Normal rate  Heart sounds: Normal heart sounds  Pulmonary:      Effort: Pulmonary effort is normal       Breath sounds: No wheezing  Abdominal:      Palpations: Abdomen is soft  Tenderness: There is no abdominal tenderness  Musculoskeletal:         General: Normal range of motion  Cervical back: Neck supple  Skin:     General: Skin is warm  Findings: No rash  Neurological:      Mental Status: She is alert and oriented to person, place, and time  Psychiatric:         Behavior: Behavior normal          LAB RESULTS:  Results for orders placed or performed in visit on 03/21/22   Protein / creatinine ratio, urine   Result Value Ref Range    PROTEIN UA 5 6     EXT Creatinine Urine 26 4     EXTERNAL Ur Prot/Creat Ratio 0 21        ASSESSMENT and PLAN:      Stage III chronic kidney disease  Lab Results   Component Value Date    EGFR 47 09/18/2020    EGFR 35 11/14/2019    EGFR 32 11/13/2019    CREATININE 1 15 09/18/2020    CREATININE 1 17 (H) 06/02/2020    CREATININE 1 49 (H) 11/14/2019   Renal function is quite stable  Advised hydration and avoiding nephrotoxic medication    Chronic kidney disease-mineral and bone disorder  Lab Results   Component Value Date    EGFR 47 09/18/2020    EGFR 35 11/14/2019    EGFR 32 11/13/2019    CREATININE 1 15 09/18/2020    CREATININE 1 17 (H) 06/02/2020    CREATININE 1 49 (H) 11/14/2019   PTH and phosphorus along with vitamin-D are within acceptable range and will continue to monitor    Diabetes mellitus, type 2 (HCC)    Lab Results   Component Value Date    HGBA1C 7 1 (H) 07/11/2022   Reasonably well control with present medication  Importance of diabetic control and effect on kidney disease discussed with the patient    Benign essential hypertension  Quite stable    Everything discussed the patient at length    I will see her back in 6 months again  Will get blood and urine test before that visit          Portions of the record may have been created with voice recognition software  Occasional wrong word or "sound a like" substitutions may have occurred due to the inherent limitations of voice recognition software  Read the chart carefully and recognize, using context, where substitutions have occurred  If you have any questions, please contact the dictating provider

## 2022-10-17 NOTE — ASSESSMENT & PLAN NOTE
Lab Results   Component Value Date    HGBA1C 7 1 (H) 07/11/2022   Reasonably well control with present medication    Importance of diabetic control and effect on kidney disease discussed with the patient

## 2022-10-17 NOTE — ASSESSMENT & PLAN NOTE
Lab Results   Component Value Date    EGFR 47 09/18/2020    EGFR 35 11/14/2019    EGFR 32 11/13/2019    CREATININE 1 15 09/18/2020    CREATININE 1 17 (H) 06/02/2020    CREATININE 1 49 (H) 11/14/2019   Renal function is quite stable    Advised hydration and avoiding nephrotoxic medication

## 2023-06-05 ENCOUNTER — TELEPHONE (OUTPATIENT)
Dept: NEPHROLOGY | Facility: CLINIC | Age: 78
End: 2023-06-05

## 2023-06-06 ENCOUNTER — OFFICE VISIT (OUTPATIENT)
Dept: NEPHROLOGY | Facility: CLINIC | Age: 78
End: 2023-06-06
Payer: COMMERCIAL

## 2023-06-06 VITALS
OXYGEN SATURATION: 99 % | WEIGHT: 156.8 LBS | RESPIRATION RATE: 16 BRPM | TEMPERATURE: 97.6 F | HEIGHT: 65 IN | SYSTOLIC BLOOD PRESSURE: 120 MMHG | DIASTOLIC BLOOD PRESSURE: 80 MMHG | BODY MASS INDEX: 26.12 KG/M2 | HEART RATE: 91 BPM

## 2023-06-06 DIAGNOSIS — N18.32 TYPE 2 DIABETES MELLITUS WITH STAGE 3B CHRONIC KIDNEY DISEASE, WITHOUT LONG-TERM CURRENT USE OF INSULIN (HCC): ICD-10-CM

## 2023-06-06 DIAGNOSIS — M89.9 CHRONIC KIDNEY DISEASE-MINERAL AND BONE DISORDER: ICD-10-CM

## 2023-06-06 DIAGNOSIS — N18.9 CHRONIC KIDNEY DISEASE-MINERAL AND BONE DISORDER: ICD-10-CM

## 2023-06-06 DIAGNOSIS — N18.32 STAGE 3B CHRONIC KIDNEY DISEASE (HCC): Primary | ICD-10-CM

## 2023-06-06 DIAGNOSIS — E11.22 TYPE 2 DIABETES MELLITUS WITH STAGE 3B CHRONIC KIDNEY DISEASE, WITHOUT LONG-TERM CURRENT USE OF INSULIN (HCC): ICD-10-CM

## 2023-06-06 DIAGNOSIS — I10 PRIMARY HYPERTENSION: ICD-10-CM

## 2023-06-06 DIAGNOSIS — E83.9 CHRONIC KIDNEY DISEASE-MINERAL AND BONE DISORDER: ICD-10-CM

## 2023-06-06 PROCEDURE — 99214 OFFICE O/P EST MOD 30 MIN: CPT | Performed by: INTERNAL MEDICINE

## 2023-06-06 NOTE — ASSESSMENT & PLAN NOTE
Lab Results   Component Value Date    HGBA1C 7 1 (H) 07/11/2022   Diabetes is reasonably well controlled    Importance of diabetic control and effect on kidney disease discussed with the patient

## 2023-06-06 NOTE — PROGRESS NOTES
NEPHROLOGY OFFICE FOLLOW UP  Rakesh Queen 70 y o  female MRN: [id number]    Encounter: [id number] 6/6/2023    REASON FOR VISIT: Rakesh Queen is a 70 y o  female who is here on 6/6/2023 for Chronic Kidney Disease and Follow-up    HPI:    Shannon Sarmiento came in today for follow-up of CKD  68-year-old woman who is doing well overall  Denies any acute complaint    No chest pain no palpitation or shortness of breath    No nausea no vomiting    Denies any urinary complaints      REVIEW OF SYSTEMS:    Review of Systems   Constitutional: Negative for activity change and fatigue  HENT: Negative for congestion and ear discharge  Eyes: Negative for photophobia and pain  Respiratory: Negative for apnea and choking  Cardiovascular: Negative for chest pain and palpitations  Gastrointestinal: Negative for abdominal distention and blood in stool  Endocrine: Negative for heat intolerance and polyphagia  Genitourinary: Negative for flank pain and urgency  Musculoskeletal: Negative for neck pain and neck stiffness  Skin: Negative for color change and wound  Allergic/Immunologic: Negative for food allergies and immunocompromised state  Neurological: Negative for seizures and facial asymmetry  Hematological: Negative for adenopathy  Does not bruise/bleed easily  Psychiatric/Behavioral: Negative for self-injury and suicidal ideas           PAST MEDICAL HISTORY:  Past Medical History:   Diagnosis Date   • A-fib ([facility] )    • Anemia    • Chronic kidney disease    • Diabetes mellitus ([facility] )    • Hypertension        PAST SURGICAL HISTORY:  Past Surgical History:   Procedure Laterality Date   • CATARACT EXTRACTION, BILATERAL     • CHOLECYSTECTOMY     • GALLBLADDER SURGERY     • HERNIA REPAIR     • REPLACEMENT TOTAL KNEE     • TONSILLECTOMY         SOCIAL HISTORY:  Social History     Substance and Sexual Activity   Alcohol Use Never     Social History     Substance and Sexual Activity   Drug Use No     Social "History     Tobacco Use   Smoking Status Former   Smokeless Tobacco Former       FAMILY HISTORY:  Family History   Problem Relation Age of Onset   • No Known Problems Mother    • Heart disease Father    • No Known Problems Sister        MEDICATIONS:    Current Outpatient Medications:   •  alendronate (FOSAMAX) 35 mg tablet, 35 mg once a week , Disp: , Rfl:   •  apixaban (ELIQUIS) 5 mg, Take 5 mg by mouth 2 (two) times a day, Disp: , Rfl:   •  diltiazem (TIAZAC) 180 MG 24 hr capsule, Take 360 mg by mouth Daily , Disp: , Rfl:   •  Empagliflozin (JARDIANCE PO), Take 25 mg by mouth in the morning, Disp: , Rfl:   •  furosemide (LASIX) 20 mg tablet, 10 mg, Disp: , Rfl:   •  levothyroxine 175 mcg tablet, Take 150 mcg by mouth 0 5 pill daily , Disp: , Rfl:   •  metoprolol succinate (TOPROL-XL) 100 mg 24 hr tablet, Take 1 tablet by mouth 2 (two) times a day, Disp: , Rfl:   •  montelukast (SINGULAIR) 10 mg tablet, Take 10 mg by mouth daily at bedtime, Disp: , Rfl:   •  Multiple Vitamins-Minerals (CENTRUM SILVER) tablet, Take 1 tablet by mouth daily, Disp: , Rfl:   •  simvastatin (ZOCOR) 40 mg tablet, simvastatin 40 mg tablet  TAKE 1 TABLET BY MOUTH EVERY DAY, Disp: , Rfl:   •  Vitamin D, Ergocalciferol, 2000 units CAPS, Take 2,000 capsules by mouth daily Patient takes 2,000 units every other day, Disp: , Rfl:     PHYSICAL EXAM:  Vitals:    06/06/23 1521   BP: 120/80   BP Location: Right arm   Patient Position: Sitting   Pulse: 91   Resp: 16   Temp: 97 6 °F (36 4 °C)   TempSrc: Temporal   SpO2: 99%   Weight: 71 1 kg (156 lb 12 8 oz)   Height: 5' 4 5\" (1 638 m)     Body mass index is 26 5 kg/m²  Physical Exam  Constitutional:       General: She is not in acute distress  Appearance: She is well-developed  HENT:      Head: Normocephalic  Eyes:      General: No scleral icterus  Conjunctiva/sclera: Conjunctivae normal    Neck:      Vascular: No JVD  Cardiovascular:      Rate and Rhythm: Normal rate        Heart " "sounds: Normal heart sounds  Pulmonary:      Effort: Pulmonary effort is normal       Breath sounds: No wheezing  Abdominal:      Palpations: Abdomen is soft  Tenderness: There is no abdominal tenderness  Musculoskeletal:         General: Normal range of motion  Cervical back: Neck supple  Skin:     General: Skin is warm  Findings: No rash  Neurological:      Mental Status: She is alert and oriented to person, place, and time  Psychiatric:         Behavior: Behavior normal          LAB RESULTS:  Results for orders placed or performed in visit on 10/12/22   Protein / creatinine ratio, urine   Result Value Ref Range    PROTEIN UA <5 0     EXT Creatinine Urine 15 7        ASSESSMENT and PLAN:      Stage III chronic kidney disease  Lab Results   Component Value Date    CREATININE 1 15 09/18/2020    CREATININE 1 17 (H) 06/02/2020    CREATININE 1 49 (H) 11/14/2019    EGFR 47 09/18/2020    EGFR 35 11/14/2019    EGFR 32 11/13/2019   Renal function is stable overall  Advise hydration and avoiding nephrotoxic medication    Diabetes mellitus, type 2 ([address] )    Lab Results   Component Value Date    HGBA1C 7 1 (H) 07/11/2022   Diabetes is reasonably well controlled  Importance of diabetic control and effect on kidney disease discussed with the patient    Benign essential hypertension  Very well controlled present medication      I will see her back in 6 months  We will get blood and urine test before that visit        Portions of the record may have been created with voice recognition software  Occasional wrong word or \"sound a like\" substitutions may have occurred due to the inherent limitations of voice recognition software  Read the chart carefully and recognize, using context, where substitutions have occurred  If you have any questions, please contact the dictating provider     "

## 2023-06-06 NOTE — ASSESSMENT & PLAN NOTE
Lab Results   Component Value Date    CREATININE 1 15 09/18/2020    CREATININE 1 17 (H) 06/02/2020    CREATININE 1 49 (H) 11/14/2019    EGFR 47 09/18/2020    EGFR 35 11/14/2019    EGFR 32 11/13/2019   Renal function is stable overall    Advise hydration and avoiding nephrotoxic medication

## 2023-07-14 ENCOUNTER — TELEPHONE (OUTPATIENT)
Dept: NEPHROLOGY | Facility: CLINIC | Age: 78
End: 2023-07-14

## 2023-12-01 ENCOUNTER — VBI (OUTPATIENT)
Dept: ADMINISTRATIVE | Facility: OTHER | Age: 78
End: 2023-12-01

## 2023-12-04 ENCOUNTER — TELEPHONE (OUTPATIENT)
Dept: NEPHROLOGY | Facility: CLINIC | Age: 78
End: 2023-12-04

## 2024-02-05 ENCOUNTER — TELEPHONE (OUTPATIENT)
Dept: SURGERY | Facility: CLINIC | Age: 79
End: 2024-02-05

## 2024-02-05 DIAGNOSIS — N18.32 STAGE 3B CHRONIC KIDNEY DISEASE (HCC): Primary | ICD-10-CM

## 2024-02-12 ENCOUNTER — TELEPHONE (OUTPATIENT)
Dept: NEPHROLOGY | Facility: CLINIC | Age: 79
End: 2024-02-12

## 2024-03-26 ENCOUNTER — TELEPHONE (OUTPATIENT)
Dept: NEPHROLOGY | Facility: CLINIC | Age: 79
End: 2024-03-26

## 2024-03-26 NOTE — TELEPHONE ENCOUNTER
Called left voice message to remind patient to get fasting labs done 1 week prior to 04/04/24 follow up appointment with Dr.Umesh Mir.  lab orders in epic

## 2024-04-03 ENCOUNTER — TELEPHONE (OUTPATIENT)
Dept: NEPHROLOGY | Facility: CLINIC | Age: 79
End: 2024-04-03

## 2024-04-03 NOTE — TELEPHONE ENCOUNTER
I called and left message on patients answering machine confirming appt for tomorow thursday 04/04/2024 with Dr. Mir. Reminded patient to have labs done prior appointment   
English

## 2024-04-04 ENCOUNTER — OFFICE VISIT (OUTPATIENT)
Dept: NEPHROLOGY | Facility: CLINIC | Age: 79
End: 2024-04-04

## 2024-04-04 VITALS
TEMPERATURE: 96.9 F | WEIGHT: 163 LBS | RESPIRATION RATE: 16 BRPM | OXYGEN SATURATION: 97 % | HEIGHT: 65 IN | DIASTOLIC BLOOD PRESSURE: 70 MMHG | SYSTOLIC BLOOD PRESSURE: 120 MMHG | BODY MASS INDEX: 27.16 KG/M2 | HEART RATE: 75 BPM

## 2024-04-04 DIAGNOSIS — N18.9 CHRONIC KIDNEY DISEASE-MINERAL AND BONE DISORDER: ICD-10-CM

## 2024-04-04 DIAGNOSIS — N18.32 TYPE 2 DIABETES MELLITUS WITH STAGE 3B CHRONIC KIDNEY DISEASE, WITHOUT LONG-TERM CURRENT USE OF INSULIN (HCC): ICD-10-CM

## 2024-04-04 DIAGNOSIS — N18.32 STAGE 3B CHRONIC KIDNEY DISEASE (HCC): Primary | ICD-10-CM

## 2024-04-04 DIAGNOSIS — E83.9 CHRONIC KIDNEY DISEASE-MINERAL AND BONE DISORDER: ICD-10-CM

## 2024-04-04 DIAGNOSIS — M89.9 CHRONIC KIDNEY DISEASE-MINERAL AND BONE DISORDER: ICD-10-CM

## 2024-04-04 DIAGNOSIS — E11.22 TYPE 2 DIABETES MELLITUS WITH STAGE 3B CHRONIC KIDNEY DISEASE, WITHOUT LONG-TERM CURRENT USE OF INSULIN (HCC): ICD-10-CM

## 2024-04-04 DIAGNOSIS — I10 BENIGN ESSENTIAL HYPERTENSION: ICD-10-CM

## 2024-04-04 RX ORDER — EMPAGLIFLOZIN AND LINAGLIPTIN 25; 5 MG/1; MG/1
1 TABLET, FILM COATED ORAL DAILY
COMMUNITY
Start: 2024-01-31

## 2024-04-04 RX ORDER — ROSUVASTATIN CALCIUM 5 MG/1
5 TABLET, COATED ORAL DAILY
COMMUNITY
Start: 2023-08-16 | End: 2024-08-15

## 2024-04-04 RX ORDER — LEVOTHYROXINE SODIUM 0.12 MG/1
TABLET ORAL
COMMUNITY
Start: 2024-02-16

## 2024-04-04 NOTE — PROGRESS NOTES
NEPHROLOGY OFFICE FOLLOW UP  Jenny Galarza 79 y.o. female MRN: [id number]    Encounter: [id number] 4/4/2024    REASON FOR VISIT: Jenny Galarza is a 79 y.o. female who is here on 4/4/2024 for Chronic Kidney Disease (Stage 3) and Follow-up  .    HPI:    Jenny came in today for follow-up of CKD.  79-year-old woman who is doing quite well regularly to be disturbed because her sister recently found with uterine cancer and getting radiation    No other acute complaint    No chest pain no palpitation or shortness of breath    No nausea no vomiting    Denies any urinary complaint        REVIEW OF SYSTEMS:    Review of Systems   Constitutional:  Negative for fatigue.   HENT:  Negative for congestion.    Eyes:  Negative for photophobia and pain.   Respiratory:  Negative for chest tightness and shortness of breath.    Cardiovascular:  Negative for chest pain and palpitations.   Gastrointestinal:  Negative for abdominal distention, abdominal pain and blood in stool.   Endocrine: Negative for polydipsia.   Genitourinary:  Negative for difficulty urinating, dysuria, flank pain, hematuria and urgency.   Musculoskeletal:  Negative for arthralgias and back pain.   Skin:  Negative for rash.   Neurological:  Negative for dizziness, light-headedness and headaches.   Hematological:  Does not bruise/bleed easily.   Psychiatric/Behavioral:  Negative for behavioral problems. The patient is not nervous/anxious.          PAST MEDICAL HISTORY:  Past Medical History:   Diagnosis Date    A-fib (HCC)     Anemia     Chronic kidney disease     Diabetes mellitus (HCC)     Hypertension        PAST SURGICAL HISTORY:  Past Surgical History:   Procedure Laterality Date    CATARACT EXTRACTION, BILATERAL      CHOLECYSTECTOMY      GALLBLADDER SURGERY      HERNIA REPAIR      REPLACEMENT TOTAL KNEE      TONSILLECTOMY         SOCIAL HISTORY:  Social History     Substance and Sexual Activity   Alcohol Use Never     Social History     Substance and  "Sexual Activity   Drug Use No     Social History     Tobacco Use   Smoking Status Former   Smokeless Tobacco Former       FAMILY HISTORY:  Family History   Problem Relation Age of Onset    No Known Problems Mother     Heart disease Father     No Known Problems Sister        MEDICATIONS:    Current Outpatient Medications:     alendronate (FOSAMAX) 35 mg tablet, 35 mg once a week , Disp: , Rfl:     apixaban (ELIQUIS) 5 mg, Take 5 mg by mouth 2 (two) times a day, Disp: , Rfl:     diltiazem (TIAZAC) 180 MG 24 hr capsule, Take 360 mg by mouth Daily , Disp: , Rfl:     furosemide (LASIX) 20 mg tablet, 10 mg, Disp: , Rfl:     Glyxambi 25-5 MG TABS, Take 1 tablet by mouth daily, Disp: , Rfl:     levothyroxine 125 mcg tablet, , Disp: , Rfl:     metoprolol succinate (TOPROL-XL) 100 mg 24 hr tablet, Take 1 tablet by mouth 2 (two) times a day, Disp: , Rfl:     montelukast (SINGULAIR) 10 mg tablet, Take 10 mg by mouth daily at bedtime, Disp: , Rfl:     Multiple Vitamins-Minerals (CENTRUM SILVER) tablet, Take 1 tablet by mouth daily, Disp: , Rfl:     rosuvastatin (CRESTOR) 5 mg tablet, Take 5 mg by mouth daily, Disp: , Rfl:     Vitamin D, Ergocalciferol, 2000 units CAPS, Take 2,000 capsules by mouth daily Patient takes 2,000 units every other day, Disp: , Rfl:     Empagliflozin (JARDIANCE PO), Take 25 mg by mouth in the morning (Patient not taking: Reported on 4/4/2024), Disp: , Rfl:     levothyroxine 175 mcg tablet, Take 175 mcg by mouth daily (Patient not taking: Reported on 4/4/2024), Disp: , Rfl:     simvastatin (ZOCOR) 40 mg tablet, simvastatin 40 mg tablet  TAKE 1 TABLET BY MOUTH EVERY DAY (Patient not taking: Reported on 4/4/2024), Disp: , Rfl:     PHYSICAL EXAM:  Vitals:    04/04/24 1049   BP: 120/70   BP Location: Right arm   Patient Position: Sitting   Pulse: 75   Resp: 16   Temp: (!) 96.9 °F (36.1 °C)   TempSrc: Temporal   SpO2: 97%   Weight: 73.9 kg (163 lb)   Height: 5' 4.5\" (1.638 m)     Body mass index is 27.55 " kg/m².    Physical Exam  Constitutional:       General: She is not in acute distress.     Appearance: She is well-developed.   HENT:      Head: Normocephalic.      Mouth/Throat:      Mouth: Mucous membranes are moist.   Eyes:      General: No scleral icterus.     Conjunctiva/sclera: Conjunctivae normal.   Neck:      Vascular: No JVD.   Cardiovascular:      Rate and Rhythm: Normal rate.      Heart sounds: Normal heart sounds.   Pulmonary:      Effort: Pulmonary effort is normal.      Breath sounds: No wheezing.   Abdominal:      Palpations: Abdomen is soft.      Tenderness: There is no abdominal tenderness.   Musculoskeletal:         General: Normal range of motion.      Cervical back: Neck supple.   Skin:     General: Skin is warm.      Findings: No rash.   Neurological:      Mental Status: She is alert and oriented to person, place, and time.   Psychiatric:         Behavior: Behavior normal.         LAB RESULTS:  Results for orders placed or performed in visit on 10/12/22   Protein / creatinine ratio, urine   Result Value Ref Range    PROTEIN UA <5.0     EXT Creatinine Urine 15.7        ASSESSMENT and PLAN:      Stage III chronic kidney disease  Lab Results   Component Value Date    EGFR 51 (L) 02/07/2024    EGFR 50 (L) 01/03/2024    EGFR 44 (L) 08/21/2023    CREATININE 1.11 (H) 02/07/2024    CREATININE 1.13 (H) 01/03/2024    CREATININE 1.26 (H) 08/21/2023   Renal function is quite stable for now.  Advise hydration and avoiding nephrotoxic medication    Chronic kidney disease-mineral and bone disorder  Lab Results   Component Value Date    EGFR 51 (L) 02/07/2024    EGFR 50 (L) 01/03/2024    EGFR 44 (L) 08/21/2023    CREATININE 1.11 (H) 02/07/2024    CREATININE 1.13 (H) 01/03/2024    CREATININE 1.26 (H) 08/21/2023   PTH and phosphorus along with vitamin D are within acceptable range and will continue to monitor    Benign essential hypertension  Very well-controlled    Diabetes mellitus, type 2 (HCC)    Lab Results  "  Component Value Date    HGBA1C 9.6 (H) 01/03/2024   Not well-controlled.  Importance of diabetic control and effect on kidney disease discussed with her.  Her medicine has been changed recently        Everything discussed with the patient at length.  I will see her back in 6 months.  Will get blood and urine test before that visit      Portions of the record may have been created with voice recognition software. Occasional wrong word or \"sound a like\" substitutions may have occurred due to the inherent limitations of voice recognition software. Read the chart carefully and recognize, using context, where substitutions have occurred.If you have any questions, please contact the dictating provider.   "

## 2024-04-04 NOTE — ASSESSMENT & PLAN NOTE
Lab Results   Component Value Date    EGFR 51 (L) 02/07/2024    EGFR 50 (L) 01/03/2024    EGFR 44 (L) 08/21/2023    CREATININE 1.11 (H) 02/07/2024    CREATININE 1.13 (H) 01/03/2024    CREATININE 1.26 (H) 08/21/2023   Renal function is quite stable for now.  Advise hydration and avoiding nephrotoxic medication

## 2024-04-04 NOTE — ASSESSMENT & PLAN NOTE
Lab Results   Component Value Date    HGBA1C 9.6 (H) 01/03/2024   Not well-controlled.  Importance of diabetic control and effect on kidney disease discussed with her.  Her medicine has been changed recently

## 2024-04-04 NOTE — ASSESSMENT & PLAN NOTE
Lab Results   Component Value Date    EGFR 51 (L) 02/07/2024    EGFR 50 (L) 01/03/2024    EGFR 44 (L) 08/21/2023    CREATININE 1.11 (H) 02/07/2024    CREATININE 1.13 (H) 01/03/2024    CREATININE 1.26 (H) 08/21/2023   PTH and phosphorus along with vitamin D are within acceptable range and will continue to monitor

## 2024-11-07 ENCOUNTER — TELEPHONE (OUTPATIENT)
Dept: NEPHROLOGY | Facility: CLINIC | Age: 79
End: 2024-11-07

## 2024-11-07 NOTE — TELEPHONE ENCOUNTER
Called left voice message to remind patient to get fasting labs done 1 week prior to 11/18/24 scheduled follow-up appointment with . also advised as a reminder labs / testing will need to be done in the appropriate time for your scheduled appointment.

## 2024-11-10 ENCOUNTER — APPOINTMENT (EMERGENCY)
Dept: CT IMAGING | Facility: HOSPITAL | Age: 79
DRG: 872 | End: 2024-11-10
Payer: COMMERCIAL

## 2024-11-10 ENCOUNTER — HOSPITAL ENCOUNTER (INPATIENT)
Facility: HOSPITAL | Age: 79
LOS: 8 days | Discharge: HOME | DRG: 872 | End: 2024-11-18
Attending: STUDENT IN AN ORGANIZED HEALTH CARE EDUCATION/TRAINING PROGRAM | Admitting: FAMILY MEDICINE
Payer: COMMERCIAL

## 2024-11-10 ENCOUNTER — APPOINTMENT (EMERGENCY)
Dept: RADIOLOGY | Facility: HOSPITAL | Age: 79
DRG: 872 | End: 2024-11-10
Payer: COMMERCIAL

## 2024-11-10 DIAGNOSIS — R21 RASH: ICD-10-CM

## 2024-11-10 DIAGNOSIS — R93.89 ABNORMAL CT SCAN: ICD-10-CM

## 2024-11-10 DIAGNOSIS — I95.9 HYPOTENSION: ICD-10-CM

## 2024-11-10 DIAGNOSIS — R19.5 FECAL OCCULT BLOOD TEST POSITIVE: ICD-10-CM

## 2024-11-10 DIAGNOSIS — R78.81 POSITIVE BLOOD CULTURE: ICD-10-CM
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
## 2024-11-10 DIAGNOSIS — R53.1 WEAKNESS: Primary | ICD-10-CM

## 2024-11-10 DIAGNOSIS — K21.9 GERD (GASTROESOPHAGEAL REFLUX DISEASE): ICD-10-CM

## 2024-11-10 DIAGNOSIS — I48.0 PAROXYSMAL ATRIAL FIBRILLATION (HCC): ICD-10-CM

## 2024-11-10 DIAGNOSIS — D64.9 ANEMIA: ICD-10-CM

## 2024-11-10 DIAGNOSIS — R60.0 LEG EDEMA: ICD-10-CM

## 2024-11-10 PROBLEM — M62.81 MUSCLE WEAKNESS (GENERALIZED): Status: ACTIVE | Noted: 2024-11-10

## 2024-11-10 PROBLEM — A41.9 SEPSIS (HCC): Status: ACTIVE | Noted: 2024-11-10

## 2024-11-10 LAB
2HR DELTA HS TROPONIN: 1 NG/L
4HR DELTA HS TROPONIN: -2 NG/L
ALBUMIN SERPL BCG-MCNC: 3.7 G/DL (ref 3.5–5)
ALP SERPL-CCNC: 75 U/L (ref 34–104)
ALT SERPL W P-5'-P-CCNC: 13 U/L (ref 7–52)
ANION GAP SERPL CALCULATED.3IONS-SCNC: 11 MMOL/L (ref 4–13)
AST SERPL W P-5'-P-CCNC: 19 U/L (ref 13–39)
BACTERIA UR QL AUTO: ABNORMAL /HPF
BASOPHILS # BLD AUTO: 0.05 THOUSANDS/ÂΜL (ref 0–0.1)
BASOPHILS NFR BLD AUTO: 0 % (ref 0–1)
BILIRUB SERPL-MCNC: 1.96 MG/DL (ref 0.2–1)
BILIRUB UR QL STRIP: NEGATIVE
BUN SERPL-MCNC: 26 MG/DL (ref 5–25)
CALCIUM SERPL-MCNC: 8.8 MG/DL (ref 8.4–10.2)
CARDIAC TROPONIN I PNL SERPL HS: 10 NG/L
CARDIAC TROPONIN I PNL SERPL HS: 11 NG/L
CARDIAC TROPONIN I PNL SERPL HS: 8 NG/L
CHLORIDE SERPL-SCNC: 96 MMOL/L (ref 96–108)
CLARITY UR: CLEAR
CO2 SERPL-SCNC: 24 MMOL/L (ref 21–32)
COLOR UR: ABNORMAL
CREAT SERPL-MCNC: 1.3 MG/DL (ref 0.6–1.3)
EOSINOPHIL # BLD AUTO: 0 THOUSAND/ÂΜL (ref 0–0.61)
EOSINOPHIL NFR BLD AUTO: 0 % (ref 0–6)
ERYTHROCYTE [DISTWIDTH] IN BLOOD BY AUTOMATED COUNT: 14.8 % (ref 11.6–15.1)
FLUAV RNA RESP QL NAA+PROBE: NEGATIVE
FLUBV RNA RESP QL NAA+PROBE: NEGATIVE
GFR SERPL CREATININE-BSD FRML MDRD: 39 ML/MIN/1.73SQ M
GLUCOSE SERPL-MCNC: 165 MG/DL (ref 65–140)
GLUCOSE SERPL-MCNC: 206 MG/DL (ref 65–140)
GLUCOSE SERPL-MCNC: 253 MG/DL (ref 65–140)
GLUCOSE UR STRIP-MCNC: ABNORMAL MG/DL
HCT VFR BLD AUTO: 38.6 % (ref 34.8–46.1)
HGB BLD-MCNC: 12.2 G/DL (ref 11.5–15.4)
HGB UR QL STRIP.AUTO: NEGATIVE
IMM GRANULOCYTES # BLD AUTO: 0.1 THOUSAND/UL (ref 0–0.2)
IMM GRANULOCYTES NFR BLD AUTO: 1 % (ref 0–2)
KETONES UR STRIP-MCNC: ABNORMAL MG/DL
LACTATE SERPL-SCNC: 0.7 MMOL/L (ref 0.5–2)
LEUKOCYTE ESTERASE UR QL STRIP: ABNORMAL
LIPASE SERPL-CCNC: 25 U/L (ref 11–82)
LYMPHOCYTES # BLD AUTO: 0.44 THOUSANDS/ÂΜL (ref 0.6–4.47)
LYMPHOCYTES NFR BLD AUTO: 3 % (ref 14–44)
MCH RBC QN AUTO: 26.2 PG (ref 26.8–34.3)
MCHC RBC AUTO-ENTMCNC: 31.6 G/DL (ref 31.4–37.4)
MCV RBC AUTO: 83 FL (ref 82–98)
MONOCYTES # BLD AUTO: 0.99 THOUSAND/ÂΜL (ref 0.17–1.22)
MONOCYTES NFR BLD AUTO: 6 % (ref 4–12)
NEUTROPHILS # BLD AUTO: 15.71 THOUSANDS/ÂΜL (ref 1.85–7.62)
NEUTS SEG NFR BLD AUTO: 90 % (ref 43–75)
NITRITE UR QL STRIP: NEGATIVE
NON-SQ EPI CELLS URNS QL MICRO: ABNORMAL /HPF
NRBC BLD AUTO-RTO: 0 /100 WBCS
PH UR STRIP.AUTO: 5.5 [PH]
PLATELET # BLD AUTO: 251 THOUSANDS/UL (ref 149–390)
PMV BLD AUTO: 9.5 FL (ref 8.9–12.7)
POTASSIUM SERPL-SCNC: 3.4 MMOL/L (ref 3.5–5.3)
PROCALCITONIN SERPL-MCNC: 3.7 NG/ML
PROT SERPL-MCNC: 6.9 G/DL (ref 6.4–8.4)
PROT UR STRIP-MCNC: NEGATIVE MG/DL
RBC # BLD AUTO: 4.66 MILLION/UL (ref 3.81–5.12)
RBC #/AREA URNS AUTO: ABNORMAL /HPF
RSV RNA RESP QL NAA+PROBE: NEGATIVE
SARS-COV-2 RNA RESP QL NAA+PROBE: NEGATIVE
SODIUM SERPL-SCNC: 131 MMOL/L (ref 135–147)
SP GR UR STRIP.AUTO: 1.03 (ref 1–1.03)
UROBILINOGEN UR STRIP-ACNC: <2 MG/DL
WBC # BLD AUTO: 17.29 THOUSAND/UL (ref 4.31–10.16)
WBC #/AREA URNS AUTO: ABNORMAL /HPF

## 2024-11-10 PROCEDURE — 99223 1ST HOSP IP/OBS HIGH 75: CPT | Performed by: FAMILY MEDICINE

## 2024-11-10 PROCEDURE — 96361 HYDRATE IV INFUSION ADD-ON: CPT

## 2024-11-10 PROCEDURE — 81001 URINALYSIS AUTO W/SCOPE: CPT | Performed by: STUDENT IN AN ORGANIZED HEALTH CARE EDUCATION/TRAINING PROGRAM

## 2024-11-10 PROCEDURE — 99285 EMERGENCY DEPT VISIT HI MDM: CPT | Performed by: STUDENT IN AN ORGANIZED HEALTH CARE EDUCATION/TRAINING PROGRAM

## 2024-11-10 PROCEDURE — 80053 COMPREHEN METABOLIC PANEL: CPT | Performed by: STUDENT IN AN ORGANIZED HEALTH CARE EDUCATION/TRAINING PROGRAM

## 2024-11-10 PROCEDURE — 0241U HB NFCT DS VIR RESP RNA 4 TRGT: CPT | Performed by: STUDENT IN AN ORGANIZED HEALTH CARE EDUCATION/TRAINING PROGRAM

## 2024-11-10 PROCEDURE — 83605 ASSAY OF LACTIC ACID: CPT | Performed by: STUDENT IN AN ORGANIZED HEALTH CARE EDUCATION/TRAINING PROGRAM

## 2024-11-10 PROCEDURE — 82948 REAGENT STRIP/BLOOD GLUCOSE: CPT

## 2024-11-10 PROCEDURE — 87154 CUL TYP ID BLD PTHGN 6+ TRGT: CPT | Performed by: STUDENT IN AN ORGANIZED HEALTH CARE EDUCATION/TRAINING PROGRAM

## 2024-11-10 PROCEDURE — 87181 SC STD AGAR DILUTION PER AGT: CPT | Performed by: STUDENT IN AN ORGANIZED HEALTH CARE EDUCATION/TRAINING PROGRAM

## 2024-11-10 PROCEDURE — 74177 CT ABD & PELVIS W/CONTRAST: CPT

## 2024-11-10 PROCEDURE — 96375 TX/PRO/DX INJ NEW DRUG ADDON: CPT

## 2024-11-10 PROCEDURE — 96365 THER/PROPH/DIAG IV INF INIT: CPT

## 2024-11-10 PROCEDURE — 87040 BLOOD CULTURE FOR BACTERIA: CPT | Performed by: STUDENT IN AN ORGANIZED HEALTH CARE EDUCATION/TRAINING PROGRAM

## 2024-11-10 PROCEDURE — 36415 COLL VENOUS BLD VENIPUNCTURE: CPT | Performed by: STUDENT IN AN ORGANIZED HEALTH CARE EDUCATION/TRAINING PROGRAM

## 2024-11-10 PROCEDURE — 85025 COMPLETE CBC W/AUTO DIFF WBC: CPT | Performed by: STUDENT IN AN ORGANIZED HEALTH CARE EDUCATION/TRAINING PROGRAM

## 2024-11-10 PROCEDURE — 71045 X-RAY EXAM CHEST 1 VIEW: CPT

## 2024-11-10 PROCEDURE — 96366 THER/PROPH/DIAG IV INF ADDON: CPT

## 2024-11-10 PROCEDURE — 71260 CT THORAX DX C+: CPT

## 2024-11-10 PROCEDURE — 99285 EMERGENCY DEPT VISIT HI MDM: CPT

## 2024-11-10 PROCEDURE — 87086 URINE CULTURE/COLONY COUNT: CPT | Performed by: FAMILY MEDICINE

## 2024-11-10 PROCEDURE — 84484 ASSAY OF TROPONIN QUANT: CPT | Performed by: STUDENT IN AN ORGANIZED HEALTH CARE EDUCATION/TRAINING PROGRAM

## 2024-11-10 PROCEDURE — 83690 ASSAY OF LIPASE: CPT | Performed by: STUDENT IN AN ORGANIZED HEALTH CARE EDUCATION/TRAINING PROGRAM

## 2024-11-10 PROCEDURE — 84145 PROCALCITONIN (PCT): CPT | Performed by: STUDENT IN AN ORGANIZED HEALTH CARE EDUCATION/TRAINING PROGRAM

## 2024-11-10 RX ORDER — ACETAMINOPHEN 325 MG/1
650 TABLET ORAL EVERY 6 HOURS PRN
Status: DISCONTINUED | OUTPATIENT
Start: 2024-11-10 | End: 2024-11-18 | Stop reason: HOSPADM

## 2024-11-10 RX ORDER — PRAVASTATIN SODIUM 40 MG
40 TABLET ORAL
Status: DISCONTINUED | OUTPATIENT
Start: 2024-11-10 | End: 2024-11-18 | Stop reason: HOSPADM

## 2024-11-10 RX ORDER — ONDANSETRON 2 MG/ML
4 INJECTION INTRAMUSCULAR; INTRAVENOUS ONCE
Status: COMPLETED | OUTPATIENT
Start: 2024-11-10 | End: 2024-11-10

## 2024-11-10 RX ORDER — LEVOTHYROXINE SODIUM 125 UG/1
125 TABLET ORAL
Status: DISCONTINUED | OUTPATIENT
Start: 2024-11-11 | End: 2024-11-18 | Stop reason: HOSPADM

## 2024-11-10 RX ORDER — POTASSIUM CHLORIDE 1500 MG/1
40 TABLET, EXTENDED RELEASE ORAL ONCE
Status: COMPLETED | OUTPATIENT
Start: 2024-11-10 | End: 2024-11-10

## 2024-11-10 RX ORDER — ACETAMINOPHEN 10 MG/ML
1000 INJECTION, SOLUTION INTRAVENOUS ONCE
Status: COMPLETED | OUTPATIENT
Start: 2024-11-10 | End: 2024-11-10

## 2024-11-10 RX ORDER — INSULIN LISPRO 100 [IU]/ML
1-5 INJECTION, SOLUTION INTRAVENOUS; SUBCUTANEOUS
Status: DISCONTINUED | OUTPATIENT
Start: 2024-11-10 | End: 2024-11-17

## 2024-11-10 RX ORDER — SODIUM CHLORIDE, SODIUM GLUCONATE, SODIUM ACETATE, POTASSIUM CHLORIDE, MAGNESIUM CHLORIDE, SODIUM PHOSPHATE, DIBASIC, AND POTASSIUM PHOSPHATE .53; .5; .37; .037; .03; .012; .00082 G/100ML; G/100ML; G/100ML; G/100ML; G/100ML; G/100ML; G/100ML
50 INJECTION, SOLUTION INTRAVENOUS CONTINUOUS
Status: DISCONTINUED | OUTPATIENT
Start: 2024-11-10 | End: 2024-11-14

## 2024-11-10 RX ADMIN — CEFTRIAXONE SODIUM 1000 MG: 10 INJECTION, POWDER, FOR SOLUTION INTRAVENOUS at 16:43

## 2024-11-10 RX ADMIN — ONDANSETRON 4 MG: 2 INJECTION INTRAMUSCULAR; INTRAVENOUS at 12:13

## 2024-11-10 RX ADMIN — SODIUM CHLORIDE, SODIUM GLUCONATE, SODIUM ACETATE, POTASSIUM CHLORIDE, MAGNESIUM CHLORIDE, SODIUM PHOSPHATE, DIBASIC, AND POTASSIUM PHOSPHATE 150 ML/HR: .53; .5; .37; .037; .03; .012; .00082 INJECTION, SOLUTION INTRAVENOUS at 18:36

## 2024-11-10 RX ADMIN — INSULIN LISPRO 2 UNITS: 100 INJECTION, SOLUTION INTRAVENOUS; SUBCUTANEOUS at 21:34

## 2024-11-10 RX ADMIN — SODIUM CHLORIDE 1000 ML: 0.9 INJECTION, SOLUTION INTRAVENOUS at 12:16

## 2024-11-10 RX ADMIN — IOHEXOL 100 ML: 350 INJECTION, SOLUTION INTRAVENOUS at 13:57

## 2024-11-10 RX ADMIN — ACETAMINOPHEN 650 MG: 325 TABLET ORAL at 21:46

## 2024-11-10 RX ADMIN — PRAVASTATIN SODIUM 40 MG: 40 TABLET ORAL at 18:36

## 2024-11-10 RX ADMIN — POTASSIUM CHLORIDE 40 MEQ: 1500 TABLET, EXTENDED RELEASE ORAL at 18:36

## 2024-11-10 RX ADMIN — APIXABAN 5 MG: 5 TABLET, FILM COATED ORAL at 18:36

## 2024-11-10 RX ADMIN — ACETAMINOPHEN 1000 MG: 1000 INJECTION INTRAVENOUS at 12:16

## 2024-11-10 RX ADMIN — SODIUM CHLORIDE 1000 ML: 0.9 INJECTION, SOLUTION INTRAVENOUS at 14:14

## 2024-11-10 NOTE — ED PROVIDER NOTES
ED Disposition       None          Assessment & Plan   {Hyperlinks  Risk Stratification - NIHSS - HEART SCORE - Fill out sepsis note and make sure you call [phone] if severe or septic shock:[id number]}    Medical Decision Making  Amount and/or Complexity of Data Reviewed  Labs: ordered.  Radiology: ordered.    Risk  Prescription drug management.             Medications   sodium chloride 0.9 % bolus 1,000 mL (has no administration in time range)   ondansetron (ZOFRAN) injection 4 mg (has no administration in time range)   acetaminophen (Ofirmev) injection 1,000 mg (has no administration in time range)       ED Risk Strat Scores                           SBIRT 20yo+      Flowsheet Row Most Recent Value   Initial Alcohol Screen: US AUDIT-C     1. How often do you have a drink containing alcohol? 0 Filed at: 11/10/2024 1137   3b. FEMALE Any Age, or MALE 65+: How often do you have 4 or more drinks on one occassion? 0 Filed at: 11/10/2024 1137   Audit-C Score 0 Filed at: 11/10/2024 1137   KASANDRA: How many times in the past year have you...    Used an illegal drug or used a prescription medication for non-medical reasons? Never Filed at: 11/10/2024 1137                            History of Present Illness   {Hyperlinks  History (Med, Surg, Fam, Social) - Current Medications - Allergies  :[id number]}    Chief Complaint   Patient presents with    Weakness - Generalized     Pt brought in via EMS with C/O feeling weak, Pt stated went to sleep last night feeling like she has the chills woke up feeling weak and fell getting out of bed today landed on her but.- LOC -BT. Pt stated said she feels like she does after getting flu vaccine last year.        Past Medical History:   Diagnosis Date    A-fib (HCC)     Anemia     Chronic kidney disease     Diabetes mellitus (HCC)     Hypertension       Past Surgical History:   Procedure Laterality Date    CATARACT EXTRACTION, BILATERAL      CHOLECYSTECTOMY      GALLBLADDER SURGERY       HERNIA REPAIR      REPLACEMENT TOTAL KNEE      TONSILLECTOMY        Family History   Problem Relation Age of Onset    No Known Problems Mother     Heart disease Father     No Known Problems Sister       Social History     Tobacco Use    Smoking status: Former    Smokeless tobacco: Former   Substance Use Topics    Alcohol use: Never    Drug use: No      E-Cigarette/Vaping      E-Cigarette/Vaping Substances      I have reviewed and agree with the history as documented.     HPI    Patient is a 79-year-old female present emerged department for multiple concerns.  Patient has had decreased oral intake and feeling weak for the last several days.  She has chills uncertain if she has had a temperature.  Patient fell out of bed today but landed on her butt.  She did not strike her head, lose consciousness and not on any blood thinning medications.  Nothing seems to make her symptoms better or worse.  Denies any current chest pain, Ashish discomfort or change in bowel or bladder habit.  History includes A-fib, hypertension, CKD and diabetes.    Review of Systems   Constitutional:  Positive for appetite change and chills. Negative for fever.   HENT:  Negative for ear pain and sore throat.    Eyes:  Negative for pain and visual disturbance.   Respiratory:  Negative for cough and shortness of breath.    Cardiovascular:  Negative for chest pain and palpitations.   Gastrointestinal:  Negative for abdominal pain and vomiting.   Genitourinary:  Negative for dysuria and hematuria.   Musculoskeletal:  Negative for arthralgias and back pain.   Skin:  Negative for color change and rash.   Neurological:  Positive for weakness. Negative for seizures and syncope.   All other systems reviewed and are negative.          Objective   {Hyperlinks  Historical Vitals - Historical Labs - Chart Review/Microbiology - Last Echo - Code Status  :[id number]}    ED Triage Vitals [11/10/24 1139]   Temperature Pulse Blood Pressure Respirations SpO2  Patient Position - Orthostatic VS   99 °F (37.2 °C) 76 91/55 16 97 % Lying      Temp Source Heart Rate Source BP Location FiO2 (%) Pain Score    Oral -- Right arm -- --      Vitals      Date and Time Temp Pulse SpO2 Resp BP Pain Score FACES Pain Rating User   11/10/24 1139 99 °F (37.2 °C) 76 97 % 16 91/55 -- -- DO            Physical Exam  Vitals and nursing note reviewed.   Constitutional:       General: She is not in acute distress.     Appearance: She is well-developed.   HENT:      Head: Normocephalic and atraumatic.      Mouth/Throat:      Mouth: Mucous membranes are dry.   Eyes:      Conjunctiva/sclera: Conjunctivae normal.   Cardiovascular:      Rate and Rhythm: Normal rate and regular rhythm.      Heart sounds: No murmur heard.  Pulmonary:      Effort: Pulmonary effort is normal. No respiratory distress.      Breath sounds: Normal breath sounds.   Abdominal:      Palpations: Abdomen is soft.      Tenderness: There is no abdominal tenderness.   Musculoskeletal:         General: No swelling.      Cervical back: Neck supple.   Skin:     General: Skin is warm and dry.      Capillary Refill: Capillary refill takes less than 2 seconds.   Neurological:      General: No focal deficit present.      Mental Status: She is alert and oriented to person, place, and time.   Psychiatric:         Mood and Affect: Mood normal.         Results Reviewed       Procedure Component Value Units Date/Time    CBC and differential [[id number]]     Lab Status: No result Specimen: Blood     Comprehensive metabolic panel [[id number]]     Lab Status: No result Specimen: Blood     Lipase [[id number]]     Lab Status: No result Specimen: Blood     HS Troponin 0hr (reflex protocol) [[id number]]     Lab Status: No result Specimen: Blood     FLU/RSV/COVID - if FLU/RSV clinically relevant (2hr TAT) [[id number]]     Lab Status: No result Specimen: Nares from Nose     UA w Reflex to Microscopic w Reflex to Culture [[id number]]     Lab Status: No result  Specimen: Urine             XR chest 1 view portable    (Results Pending)       Procedures    ED Medication and Procedure Management   Prior to Admission Medications   Prescriptions Last Dose Informant Patient Reported? Taking?   Empagliflozin (JARDIANCE PO)  Self Yes No   Sig: Take 25 mg by mouth in the morning   Patient not taking: Reported on 2024   Glyxambi 25-5 MG TABS  Self Yes No   Sig: Take 1 tablet by mouth daily   Multiple Vitamins-Minerals (CENTRUM SILVER) tablet  Self Yes No   Sig: Take 1 tablet by mouth daily   Vitamin D, Ergocalciferol, 2000 units CAPS  Self Yes No   Sig: Take 2,000 capsules by mouth daily Patient takes 2,000 units every other day   alendronate (FOSAMAX) 35 mg tablet  Self Yes No   Si mg once a week    apixaban (ELIQUIS) 5 mg  Self Yes No   Sig: Take 5 mg by mouth 2 (two) times a day   diltiazem (TIAZAC) 180 MG 24 hr capsule  Self Yes No   Sig: Take 360 mg by mouth Daily    furosemide (LASIX) 20 mg tablet  Self Yes No   Sig: 10 mg   levothyroxine 125 mcg tablet  Self Yes No   levothyroxine 175 mcg tablet  Self Yes No   Sig: Take 175 mcg by mouth daily   Patient not taking: Reported on 2024   metoprolol succinate (TOPROL-XL) 100 mg 24 hr tablet  Self Yes No   Sig: Take 1 tablet by mouth 2 (two) times a day   montelukast (SINGULAIR) 10 mg tablet  Self Yes No   Sig: Take 10 mg by mouth daily at bedtime   rosuvastatin (CRESTOR) 5 mg tablet  Self Yes No   Sig: Take 5 mg by mouth daily   simvastatin (ZOCOR) 40 mg tablet  Self Yes No   Sig: simvastatin 40 mg tablet   TAKE 1 TABLET BY MOUTH EVERY DAY   Patient not taking: Reported on 2024      Facility-Administered Medications: None     Patient's Medications   Discharge Prescriptions    No medications on file     No discharge procedures on file.  ED SEPSIS DOCUMENTATION          (37.3 °C) 93 97 % 29 108/68 -- -- PA   11/15/24 1703 -- 121 99 % 23 -- -- -- TT   11/15/24 1600 -- -- -- -- -- No Pain -- TT   11/15/24 1536 98 °F (36.7 °C) 118 96 % 29 129/65 -- -- PA   11/15/24 1221 -- 97 98 % 25 116/73 -- -- KD   11/15/24 1129 -- 103 -- -- 121/69 -- -- KD   11/15/24 1100 97.7 °F (36.5 °C) 109 97 % 19 121/69 -- -- DB   11/15/24 1000 -- 102 100 % 33 -- -- -- KD   11/15/24 0832 -- 126 98 % 41 111/65 No Pain -- KD   11/15/24 0827 -- 112 -- -- 111/65 -- -- KD   11/15/24 0400 -- -- -- -- -- No Pain -- KD   11/15/24 0300 98.4 °F (36.9 °C) 97 96 % 27 114/60 -- -- TNS   11/15/24 0036 -- 107 -- -- -- -- -- NSL   11/14/24 2300 98.2 °F (36.8 °C) 97 91 % 20 119/64 -- -- TNS   11/14/24 2010 99.5 °F (37.5 °C) 100 95 % 20 102/62 No Pain -- CC   11/14/24 1649 -- 103 100 % 28 111/74 -- -- KD   11/14/24 1430 98.9 °F (37.2 °C) 101 98 % 19 114/74 No Pain -- CC   11/14/24 1215 -- -- -- -- -- No Pain -- KD   11/14/24 1100 99 °F (37.2 °C) 101 95 % 23 112/71 -- -- JS   11/14/24 0909 -- 114 -- -- 130/60 -- -- KD   11/14/24 0906 -- 115 -- -- 130/60 -- -- KD   11/14/24 0815 -- -- -- -- -- No Pain -- KD   11/14/24 0800 -- 110 94 % 28 117/60 -- --    11/14/24 0715 -- 103 93 % 18 -- -- --    11/14/24 0600 -- 102 93 % 24 -- -- --    11/14/24 0500 -- 99 88 % 17 -- -- --    11/14/24 0400 99.2 °F (37.3 °C) 95 94 % 21 118/79 No Pain --    11/14/24 0300 -- 110 96 % 44 -- -- --    11/14/24 0200 -- 115 98 % 24 123/70 -- --    11/14/24 0153 -- -- 96 % 28 -- -- --    11/14/24 0153 -- 104 -- -- 123/70 -- --    11/14/24 0100 -- 100 95 % 15 -- -- --    11/14/24 0000 -- 100 96 % 17 -- No Pain --    11/13/24 2300 -- 107 94 % 19 -- -- --    11/13/24 2224 99.1 °F (37.3 °C) -- -- -- -- -- -- [facility]   11/13/24 2224 -- 109 96 % 26 119/66 -- --    11/13/24 2200 -- 106 91 % 20 -- -- --    11/13/24 2100 -- 118 94 % 39 -- No Pain --    11/13/24 2000 99.1 °F (37.3 °C) 108 92 % 35 126/84 No Pain --    11/13/24 1900 -- 136  97 % 36 -- -- -- JS   11/13/24 1800 -- -- -- -- 109/82 -- -- MT   11/13/24 1700 -- -- -- -- 140/79 -- -- MT   11/13/24 1600 -- -- -- 26 122/60 2 -- MT   11/13/24 1500 -- 105 -- 22 112/58 -- -- MT   11/13/24 1400 -- -- -- -- 135/63 -- -- MT   11/13/24 1300 98.9 °F (37.2 °C) -- -- -- 122/70 No Pain -- MT   11/13/24 1116 -- 110 94 % -- 138/100 -- -- DII   11/13/24 1041 -- 129 -- -- 125/77 -- -- SR   11/13/24 1007 99.8 °F (37.7 °C) 122 96 % -- 125/77 -- -- DII   11/13/24 0922 99.7 °F (37.6 °C) 119 94 % -- 123/78 -- -- DII   11/13/24 0800 -- -- -- -- -- No Pain -- KW   11/13/24 0702 100.6 °F (38.1 °C) 128 94 % 18 124/79 -- -- DII   11/13/24 0701 -- -- -- -- -- No Pain -- KW   11/13/24 0524 -- 123 96 % -- 128/80 -- -- DII   11/13/24 0311 99.7 °F (37.6 °C) 121 98 % 16 123/78 -- -- DII   11/12/24 2324 -- -- -- -- 119/77 -- -- DII   11/12/24 2158 99.1 °F (37.3 °C) 121 90 % 15 108/68 -- -- DII   11/12/24 2049 100.4 °F (38 °C) -- -- -- -- -- -- DII   11/12/24 1945 -- -- -- -- -- Med Not Given for Pain - for MAR use only -- BG   11/12/24 1929 101 °F (38.3 °C) 139 96 % -- 127/72 -- -- DII   11/12/24 1927 101 °F (38.3 °C) 121 98 % 16 128/70 -- -- DII   11/12/24 1740 -- -- -- -- 143/84 -- -- TT   11/12/24 1502 98.8 °F (37.1 °C) -- -- -- 142/82 -- -- DII   11/12/24 1502 98.8 °F (37.1 °C) 121 80 % -- 142/82 -- -- DII   11/12/24 1501 98.8 °F (37.1 °C) 118 89 % -- 142/82 -- -- DII   11/12/24 1208 -- 116 98 % -- 151/95 -- -- DII   11/12/24 1203 -- -- -- -- 151/95 -- -- TT   11/12/24 1130 -- -- -- -- -- No Pain -- TT   11/12/24 0725 99.4 °F (37.4 °C) 142 95 % -- 122/80 -- -- DII   11/12/24 0515 -- 139 95 % -- -- -- -- DII   11/11/24 2138 -- -- -- -- -- No Pain -- FM   11/11/24 2138 99 °F (37.2 °C) 119 96 % 18 111/76 -- -- DII   11/11/24 1709 -- -- -- -- -- 8 -- JV   11/11/24 1455 97.5 °F (36.4 °C) 73 81 % -- 105/77 -- -- DII   11/11/24 1454 97.5 °F (36.4 °C) 61 51 % -- 105/77 -- -- DII   11/11/24 1454 97.5 °F (36.4 °C) -- -- -- 105/77  -- -- DII   11/11/24 0941 -- -- -- -- -- 8 -- KF   11/11/24 0940 -- -- -- -- -- 8 -- KF   11/11/24 0752 98.1 °F (36.7 °C) 106 95 % -- 122/72 -- -- DII   11/11/24 0135 -- -- -- -- 97/57 -- -- DR   11/11/24 0135 -- 86 96 % -- -- -- -- DII   11/10/24 2352 -- 75 91 % -- 92/52 -- -- DR   11/10/24 2352 -- -- -- 18 -- -- -- [facility]   11/10/24 2146 -- -- -- -- -- Med Not Given for Pain - for MAR use only -- DR   11/10/24 2135 101 °F (38.3 °C) 69 92 % -- 98/50 -- -- DR   11/10/24 1956 -- 88 94 % -- 119/63 -- -- DII   11/10/24 1930 -- -- -- -- -- No Pain -- DR   11/10/24 1828 -- -- -- -- -- No Pain --    11/10/24 1757 -- -- -- 18 -- -- -- MB   11/10/24 1757 97.9 °F (36.6 °C) 85 94 % -- 147/115 -- -- DII   11/10/24 1645 -- 86 98 % 18 94/51 -- -- BS   11/10/24 1514 -- 92 -- 18 92/62 -- -- AMG   11/10/24 1415 -- 83 97 % 16 99/49 -- -- DO   11/10/24 1139 99 °F (37.2 °C) 76 97 % 16 91/55 -- -- DO            Physical Exam  Vitals and nursing note reviewed.   Constitutional:       General: She is not in acute distress.     Appearance: She is well-developed.   HENT:      Head: Normocephalic and atraumatic.      Mouth/Throat:      Mouth: Mucous membranes are dry.   Eyes:      Conjunctiva/sclera: Conjunctivae normal.   Cardiovascular:      Rate and Rhythm: Normal rate and regular rhythm.      Heart sounds: No murmur heard.  Pulmonary:      Effort: Pulmonary effort is normal. No respiratory distress.      Breath sounds: Normal breath sounds.   Abdominal:      Palpations: Abdomen is soft.      Tenderness: There is no abdominal tenderness.   Musculoskeletal:         General: No swelling.      Cervical back: Neck supple.   Skin:     General: Skin is warm and dry.      Capillary Refill: Capillary refill takes less than 2 seconds.   Neurological:      General: No focal deficit present.      Mental Status: She is alert and oriented to person, place, and time.   Psychiatric:         Mood and Affect: Mood normal.         Results Reviewed        Procedure Component Value Units Date/Time    Blood culture #2 [[id number]] Collected: 11/10/24 1421    Lab Status: Final result Specimen: Blood from Arm, Left Updated: 11/16/24 0001     Blood Culture No Growth After 5 Days.    Blood culture #1 [[id number]]  (Abnormal)  (Susceptibility) Collected: 11/10/24 1415    Lab Status: Final result Specimen: Blood from Arm, Right Updated: 11/13/24 1322     Blood Culture Streptococcus pyogenes (Group A)     Gram Stain Result Gram positive cocci in pairs and chains    Susceptibility       Streptococcus pyogenes (Group A) (1)       Antibiotic Interpretation Microscan   Method Status    Penicillin Susceptible 0.008 ug/ml SANTIAGO Final    Ceftriaxone ($$) Susceptible 0.01 ug/ml SANTIAGO Final    Vancomycin ($) Susceptible 0.25 ug/ml SANTIAGO Final                       Urine culture [[id number]] Collected: 11/10/24 2101    Lab Status: Final result Specimen: Urine, Clean Catch Updated: 11/11/24 1731     Urine Culture No Growth <1000 cfu/mL    Blood Culture Identification Panel [[id number]]  (Abnormal) Collected: 11/10/24 1415    Lab Status: Final result Specimen: Blood from Arm, Right Updated: 11/11/24 1158     Streptococcus Pyogenes (Group A) Detected    Narrative:      Routine culture and susceptiblity to follow for confirmation.    Film Array panel tests for 11 gram positive organisms, 15 gram negative organisms, 7 yeast species and 10 resistance genes.     CBC and differential [[id number]]  (Abnormal) Collected: 11/11/24 0552    Lab Status: Final result Specimen: Blood from Hand, Left Updated: 11/11/24 0705     WBC 14.30 Thousand/uL      RBC 3.87 Million/uL      Hemoglobin 10.5 g/dL      Hematocrit 32.8 %      MCV 85 fL      MCH 27.1 pg      MCHC 32.0 g/dL      RDW 15.2 %      MPV 9.6 fL      Platelets 178 Thousands/uL      nRBC 0 /100 WBCs      Segmented % 91 %      Immature Grans % 1 %      Lymphocytes % 3 %      Monocytes % 5 %      Eosinophils Relative 0 %      Basophils Relative 0 %       Absolute Neutrophils 13.00 Thousands/µL      Absolute Immature Grans 0.13 Thousand/uL      Absolute Lymphocytes 0.41 Thousands/µL      Absolute Monocytes 0.66 Thousand/µL      Eosinophils Absolute 0.05 Thousand/µL      Basophils Absolute 0.05 Thousands/µL     Narrative:      This is an appended report.  These results have been appended to a previously verified report.    Procalcitonin, Next Day AM Collection [[id number]]  (Abnormal) Collected: 11/11/24 0552    Lab Status: Final result Specimen: Blood from Hand, Left Updated: 11/11/24 0640     Procalcitonin 41.52 ng/ml     Comprehensive metabolic panel [[id number]]  (Abnormal) Collected: 11/11/24 0552    Lab Status: Final result Specimen: Blood from Hand, Left Updated: 11/11/24 0631     Sodium 133 mmol/L      Potassium 3.5 mmol/L      Chloride 100 mmol/L      CO2 21 mmol/L      ANION GAP 12 mmol/L      BUN 23 mg/dL      Creatinine 1.39 mg/dL      Glucose 130 mg/dL      Calcium 7.8 mg/dL      Corrected Calcium 8.3 mg/dL      AST 25 U/L      ALT 16 U/L      Alkaline Phosphatase 68 U/L      Total Protein 6.2 g/dL      Albumin 3.4 g/dL      Total Bilirubin 1.14 mg/dL      eGFR 36 ml/min/1.73sq m     Narrative:      National Kidney Disease Foundation guidelines for Chronic Kidney Disease (CKD):     Stage 1 with normal or high GFR (GFR > 90 mL/min/1.73 square meters)    Stage 2 Mild CKD (GFR = 60-89 mL/min/1.73 square meters)    Stage 3A Moderate CKD (GFR = 45-59 mL/min/1.73 square meters)    Stage 3B Moderate CKD (GFR = 30-44 mL/min/1.73 square meters)    Stage 4 Severe CKD (GFR = 15-29 mL/min/1.73 square meters)    Stage 5 End Stage CKD (GFR <15 mL/min/1.73 square meters)  Note: GFR calculation is accurate only with a steady state creatinine    HS Troponin I 4hr [[id number]]  (Normal) Collected: 11/10/24 1648    Lab Status: Final result Specimen: Blood from Arm, Left Updated: 11/10/24 1722     hs TnI 4hr 8 ng/L      Delta 4hr hsTnI -2 ng/L     Urine Microscopic [[id number]]   (Abnormal) Collected: 11/10/24 1625    Lab Status: Final result Specimen: Urine, Clean Catch Updated: 11/10/24 1706     RBC, UA None Seen /hpf      WBC, UA 2-4 /hpf      Epithelial Cells Occasional /hpf      Bacteria, UA None Seen /hpf     UA w Reflex to Microscopic w Reflex to Culture [[id number]]  (Abnormal) Collected: 11/10/24 1625    Lab Status: Final result Specimen: Urine, Clean Catch Updated: 11/10/24 1704     Color, UA Light Yellow     Clarity, UA Clear     Specific Gravity, UA 1.029     pH, UA 5.5     Leukocytes, UA Elevated glucose may cause decreased leukocyte values. See urine microscopic for UWBC result     Nitrite, UA Negative     Protein, UA Negative mg/dl      Glucose, UA >=1000 (1%) mg/dl      Ketones, UA Trace mg/dl      Urobilinogen, UA <2.0 mg/dl      Bilirubin, UA Negative     Occult Blood, UA Negative    HS Troponin I 2hr [[id number]]  (Normal) Collected: 11/10/24 1531    Lab Status: Final result Specimen: Blood from Arm, Left Updated: 11/10/24 1607     hs TnI 2hr 11 ng/L      Delta 2hr hsTnI 1 ng/L     Lactic acid, plasma (w/reflex if result > 2.0) [[id number]]  (Normal) Collected: 11/10/24 1421    Lab Status: Final result Specimen: Blood from Arm, Left Updated: 11/10/24 1455     LACTIC ACID 0.7 mmol/L     Narrative:      Result may be elevated if tourniquet was used during collection.    Procalcitonin [[id number]]  (Abnormal) Collected: 11/10/24 1208    Lab Status: Final result Specimen: Blood from Arm, Left Updated: 11/10/24 1443     Procalcitonin 3.70 ng/ml     FLU/RSV/COVID - if FLU/RSV clinically relevant (2hr TAT) [[id number]]  (Normal) Collected: 11/10/24 1208    Lab Status: Final result Specimen: Nares from Nose Updated: 11/10/24 1257     SARS-CoV-2 Negative     INFLUENZA A PCR Negative     INFLUENZA B PCR Negative     RSV PCR Negative    Narrative:      This test has been performed using the CoV-2/Flu/RSV plus assay on the [employer] GeneXpert platform. This test has been validated by the   and verified by the performing laboratory.     This test is designed to amplify and detect the following: nucleocapsid (N), envelope (E), and RNA-dependent RNA polymerase (RdRP) genes of the SARS-CoV-2 genome; matrix (M), basic polymerase (PB2), and acidic protein (PA) segments of the influenza A genome; matrix (M) and non-structural protein (NS) segments of the influenza B genome, and the nucleocapsid genes of RSV A and RSV B.     Positive results are indicative of the presence of Flu A, Flu B, RSV, and/or SARS-CoV-2 RNA. Positive results for SARS-CoV-2 or suspected novel influenza should be reported to state, local, or federal health departments according to local reporting requirements.      All results should be assessed in conjunction with clinical presentation and other laboratory markers for clinical management.     FOR PEDIATRIC PATIENTS - copy/paste COVID Guidelines URL to browser: [email or web address]       HS Troponin 0hr (reflex protocol) [[id number]]  (Normal) Collected: 11/10/24 1208    Lab Status: Final result Specimen: Blood from Arm, Left Updated: 11/10/24 1241     hs TnI 0hr 10 ng/L     Comprehensive metabolic panel [[id number]]  (Abnormal) Collected: 11/10/24 1208    Lab Status: Final result Specimen: Blood from Arm, Left Updated: 11/10/24 1232     Sodium 131 mmol/L      Potassium 3.4 mmol/L      Chloride 96 mmol/L      CO2 24 mmol/L      ANION GAP 11 mmol/L      BUN 26 mg/dL      Creatinine 1.30 mg/dL      Glucose 206 mg/dL      Calcium 8.8 mg/dL      AST 19 U/L      ALT 13 U/L      Alkaline Phosphatase 75 U/L      Total Protein 6.9 g/dL      Albumin 3.7 g/dL      Total Bilirubin 1.96 mg/dL      eGFR 39 ml/min/1.73sq m     Narrative:      National Kidney Disease Foundation guidelines for Chronic Kidney Disease (CKD):     Stage 1 with normal or high GFR (GFR > 90 mL/min/1.73 square meters)    Stage 2 Mild CKD (GFR = 60-89 mL/min/1.73  square meters)    Stage 3A Moderate CKD (GFR = 45-59 mL/min/1.73 square meters)    Stage 3B Moderate CKD (GFR = 30-44 mL/min/1.73 square meters)    Stage 4 Severe CKD (GFR = 15-29 mL/min/1.73 square meters)    Stage 5 End Stage CKD (GFR <15 mL/min/1.73 square meters)  Note: GFR calculation is accurate only with a steady state creatinine    Lipase [[id number]]  (Normal) Collected: 11/10/24 1208    Lab Status: Final result Specimen: Blood from Arm, Left Updated: 11/10/24 1232     Lipase 25 u/L     CBC and differential [[id number]]  (Abnormal) Collected: 11/10/24 1208    Lab Status: Final result Specimen: Blood from Arm, Left Updated: 11/10/24 1230     WBC 17.29 Thousand/uL      RBC 4.66 Million/uL      Hemoglobin 12.2 g/dL      Hematocrit 38.6 %      MCV 83 fL      MCH 26.2 pg      MCHC 31.6 g/dL      RDW 14.8 %      MPV 9.5 fL      Platelets 251 Thousands/uL      nRBC 0 /100 WBCs      Segmented % 90 %      Immature Grans % 1 %      Lymphocytes % 3 %      Monocytes % 6 %      Eosinophils Relative 0 %      Basophils Relative 0 %      Absolute Neutrophils 15.71 Thousands/µL      Absolute Immature Grans 0.10 Thousand/uL      Absolute Lymphocytes 0.44 Thousands/µL      Absolute Monocytes 0.99 Thousand/µL      Eosinophils Absolute 0.00 Thousand/µL      Basophils Absolute 0.05 Thousands/µL     Narrative:      This is an appended report.  These results have been appended to a previously verified report.             VAS VENOUS DUPLEX - LOWER LIMB BILATERAL   Final Interpretation by Christiano Monzon MD (11/13 0019)      CT chest abdomen pelvis w contrast   Final Interpretation by Julio Lawrence MD (11/10 1517)      XR chest 1 view portable   Final Interpretation by Tex Echevarria MD (11/11 0702)      No acute cardiopulmonary disease.            Workstation performed: [id number]             Procedures    ED Medication and Procedure Management   Prior to Admission Medications   Prescriptions Last Dose Informant Patient Reported?  Taking?   Empagliflozin (JARDIANCE PO)  Self Yes No   Sig: Take 25 mg by mouth in the morning   Patient not taking: Reported on 2024   Glyxambi 25-5 MG TABS  Self Yes No   Sig: Take 1 tablet by mouth daily   Multiple Vitamins-Minerals (CENTRUM SILVER) tablet  Self Yes No   Sig: Take 1 tablet by mouth daily   Vitamin D, Ergocalciferol, 2000 units CAPS  Self Yes No   Sig: Take 2,000 capsules by mouth daily Patient takes 2,000 units every other day   alendronate (FOSAMAX) 35 mg tablet  Self Yes No   Si mg once a week    apixaban (ELIQUIS) 5 mg  Self Yes No   Sig: Take 5 mg by mouth 2 (two) times a day   diltiazem (TIAZAC) 180 MG 24 hr capsule  Self Yes No   Sig: Take 360 mg by mouth Daily    furosemide (LASIX) 20 mg tablet  Self Yes No   Sig: 10 mg   levothyroxine 125 mcg tablet  Self Yes No   levothyroxine 175 mcg tablet  Self Yes No   Sig: Take 175 mcg by mouth daily   Patient not taking: Reported on 2024   metoprolol succinate (TOPROL-XL) 100 mg 24 hr tablet  Self Yes No   Sig: Take 1 tablet by mouth 2 (two) times a day   montelukast (SINGULAIR) 10 mg tablet  Self Yes No   Sig: Take 10 mg by mouth daily at bedtime   rosuvastatin (CRESTOR) 5 mg tablet  Self Yes No   Sig: Take 5 mg by mouth daily   simvastatin (ZOCOR) 40 mg tablet  Self Yes No   Sig: simvastatin 40 mg tablet   TAKE 1 TABLET BY MOUTH EVERY DAY   Patient not taking: Reported on 2024      Facility-Administered Medications: None     Discharge Medication List as of 2024 10:45 AM        START taking these medications    Details   diphenhydrAMINE-zinc acetate (BENADRYL) cream Apply topically 3 (three) times a day as needed for itching, Starting Mon 2024, Normal      pantoprazole (PROTONIX) 40 mg tablet Take 1 tablet (40 mg total) by mouth daily in the early morning, Starting Tue 2024, Normal           CONTINUE these medications which have CHANGED    Details   furosemide (LASIX) 20 mg tablet Take 1 tablet (20 mg total) by  mouth daily, Starting Mon 11/18/2024, Until Wed 12/18/2024, Normal           CONTINUE these medications which have NOT CHANGED    Details   alendronate (FOSAMAX) 35 mg tablet 35 mg once a week , Starting Fri 4/23/2021, Historical Med      apixaban (ELIQUIS) 5 mg Take 5 mg by mouth 2 (two) times a day, Historical Med      diltiazem (TIAZAC) 180 MG 24 hr capsule Take 360 mg by mouth Daily , Historical Med      Empagliflozin (JARDIANCE PO) Take 25 mg by mouth in the morning, Historical Med      Glyxambi 25-5 MG TABS Take 1 tablet by mouth daily, Starting Wed 1/31/2024, Historical Med      levothyroxine 125 mcg tablet Historical Med      metoprolol succinate (TOPROL-XL) 100 mg 24 hr tablet Take 1 tablet by mouth 2 (two) times a day, Historical Med      montelukast (SINGULAIR) 10 mg tablet Take 10 mg by mouth daily at bedtime, Historical Med      Multiple Vitamins-Minerals (CENTRUM SILVER) tablet Take 1 tablet by mouth daily, Historical Med      rosuvastatin (CRESTOR) 5 mg tablet Take 5 mg by mouth daily, Starting Wed 8/16/2023, Until Thu 8/15/2024, Historical Med      simvastatin (ZOCOR) 40 mg tablet simvastatin 40 mg tablet   TAKE 1 TABLET BY MOUTH EVERY DAY, Historical Med      Vitamin D, Ergocalciferol, 2000 units CAPS Take 2,000 capsules by mouth daily Patient takes 2,000 units every other day, Historical Med           Outpatient Discharge Orders   Discharge Diet     Activity as tolerated     ED SEPSIS DOCUMENTATION   Time reflects when diagnosis was documented in both MDM as applicable and the Disposition within this note       Time User Action Codes Description Comment    11/10/2024  3:30 PM Elizabeth Lo Add [R53.1] Weakness     11/10/2024  3:30 PM Elizabeth Lo Add [I95.9] Hypotension     11/10/2024  3:30 PM Elizabeth Lo Add [W19.XXXA] Fall, initial encounter     11/10/2024  5:33 PM Elizabeth Lo Add [R93.89] Abnormal CT scan     11/11/2024  2:58 PM Karol Moreno Add [R78.81] Positive blood culture      11/13/2024  9:25 AM Lex Dick [I48.0] Paroxysmal atrial fibrillation (HCC) in [facility]     11/16/2024  8:46 AM Lex Dick Add [D64.9] Anemia     11/16/2024  8:46 AM Lex Dick Add [R19.5] Fecal occult blood test positive     11/18/2024 10:33 AM Lex Dick [R21] Rash     11/18/2024 10:33 AM Lex Dick Add [K21.9] GERD (gastroesophageal reflux disease)     11/18/2024 10:33 AM Lex Dick Add [R60.0] Leg edema                  Elizabeth Lo DO  11/27/24 0052

## 2024-11-10 NOTE — ASSESSMENT & PLAN NOTE
History of paroxysmal A-fib  Holding patient's home Toprol-XL/diltiazem due to hypotension  Continue Eliquis.

## 2024-11-10 NOTE — H&P
"H&P - Hospitalist   Name: Jenny Galarza 79 y.o. female I MRN: [id number]  Unit/Bed#: ED 14 I Date of Admission: 11/10/2024   Date of Service: 11/10/2024 I Hospital Day: 0     Assessment & Plan  Sepsis (HCC)  Meeting criteria due to tachycardia/leukocytosis with suspected UTI  Lactate 0.7, initial Pro-Renan 3.70, continue to trend  UA positive, follow-up urine culture  Follow-up blood culture  Flu RSV COVID-negative  CT chest abdomen pelvis with no acute infectious findings  Started empirically on IV ceftriaxone  2 L IV fluid bolus given in ED, continue with maintenance fluids.  Muscle weakness (generalized)  Coming in with generalized weakness/poor oral intake/lightheadedness/chills    Likely in the setting of UTI  Patient also noted to be hypotensive in the ER with systolic blood pressure readings in the 90s.  Patient receiving empiric IV fluids/IV antibiotics  Hold home antihypertensive agents.  PT OT eval requested  Hypothyroidism  Continue with Synthroid  Hyperlipidemia, mixed  Continue statin.  Diabetes mellitus, type 2 (Formerly [facility])  Lab Results   Component Value Date    HGBA1C 7.4 (H) 07/10/2024       No results for input(s): \"POCGLU\" in the last 72 hours.    Blood Sugar Average: Last 72 hrs:    Holding home Jardiance/Glyxambi  Patient placed on sliding scale coverage with ACHS checks  Consistent carb diet  Hypoglycemia protocol.  Afib (Formerly [facility])  History of paroxysmal A-fib  Holding patient's home Toprol-XL/diltiazem due to hypotension  Continue Eliquis.      VTE Pharmacologic Prophylaxis: VTE Score: 5 heparin  Code Status: Level 1 - Full Code       Anticipated Length of Stay: Patient will be admitted on an inpatient basis with an anticipated length of stay of greater than 2 midnights secondary to ivf/iv abx.    History of Present Illness   Chief Complaint: generalized weakness    Jenny Galarza is a 79 y.o. female with a PMH of A-fib on Eliquis/hypertension/hyperlipidemia/diabetes/hypothyroid who presents with " chills/weakness/lightheadedness.    Reports symptoms have been ongoing for 2 days now.  She felt extremely lightheaded and wobbly on her feet.  Also reporting symptoms of chills.  No fevers.  Overall poor appetite and feeling weak hence presented to ER.    Denies any urinary symptoms  Denies any productive cough/shortness of breath  Denies any new rashes  Does report some nausea however no vomiting/abdominal pain or diarrhea.    Review of Systems   Constitutional:  Positive for activity change, appetite change and chills. Negative for fever.   HENT:  Negative for ear pain and sore throat.    Eyes:  Negative for pain and visual disturbance.   Respiratory:  Negative for cough and shortness of breath.    Cardiovascular:  Negative for chest pain and palpitations.   Gastrointestinal:  Positive for nausea. Negative for abdominal pain and vomiting.   Genitourinary:  Negative for dysuria and hematuria.   Musculoskeletal:  Negative for arthralgias and back pain.   Skin:  Negative for color change and rash.   Neurological:  Positive for light-headedness. Negative for seizures and syncope.   All other systems reviewed and are negative.      Historical Information   Past Medical History:   Diagnosis Date    A-fib (HCC)     Anemia     Chronic kidney disease     Diabetes mellitus (HCC)     Hypertension      Past Surgical History:   Procedure Laterality Date    CATARACT EXTRACTION, BILATERAL      CHOLECYSTECTOMY      GALLBLADDER SURGERY      HERNIA REPAIR      REPLACEMENT TOTAL KNEE      TONSILLECTOMY       Social History     Tobacco Use    Smoking status: Former    Smokeless tobacco: Former   Substance and Sexual Activity    Alcohol use: Never    Drug use: No    Sexual activity: Not Currently     Partners: Male     E-Cigarette/Vaping     E-Cigarette/Vaping Substances     Family history non-contributory  Social History:  Marital Status:        Meds/Allergies   I have reviewed home medications with patient personally.  Prior  to Admission medications    Medication Sig Start Date End Date Taking? Authorizing Provider   alendronate (FOSAMAX) 35 mg tablet 35 mg once a week  4/23/21   Historical Provider, MD   apixaban (ELIQUIS) 5 mg Take 5 mg by mouth 2 (two) times a day    Historical Provider, MD   diltiazem (TIAZAC) 180 MG 24 hr capsule Take 360 mg by mouth Daily     Historical Provider, MD   Empagliflozin (JARDIANCE PO) Take 25 mg by mouth in the morning  Patient not taking: Reported on 4/4/2024    Historical Provider, MD   furosemide (LASIX) 20 mg tablet 10 mg 8/4/16   Historical Provider, MD   Glyxambi 25-5 MG TABS Take 1 tablet by mouth daily 1/31/24   Historical Provider, MD   levothyroxine 125 mcg tablet  2/16/24   Historical Provider, MD   levothyroxine 175 mcg tablet Take 175 mcg by mouth daily  Patient not taking: Reported on 4/4/2024 11/21/17   Historical Provider, MD   metoprolol succinate (TOPROL-XL) 100 mg 24 hr tablet Take 1 tablet by mouth 2 (two) times a day    Historical Provider, MD   montelukast (SINGULAIR) 10 mg tablet Take 10 mg by mouth daily at bedtime    Historical Provider, MD   Multiple Vitamins-Minerals (CENTRUM SILVER) tablet Take 1 tablet by mouth daily    Historical Provider, MD   rosuvastatin (CRESTOR) 5 mg tablet Take 5 mg by mouth daily 8/16/23 8/15/24  Historical Provider, MD   simvastatin (ZOCOR) 40 mg tablet simvastatin 40 mg tablet   TAKE 1 TABLET BY MOUTH EVERY DAY  Patient not taking: Reported on 4/4/2024    Historical Provider, MD   Vitamin D, Ergocalciferol, 2000 units CAPS Take 2,000 capsules by mouth daily Patient takes 2,000 units every other day    Historical Provider, MD     Allergies   Allergen Reactions    Amoxicillin-Pot Clavulanate Rash and Hives    Bacitracin Itching and Edema     Action Taken: osorio swelling, had to go to er;     Clindamycin      Other reaction(s): Rash       Objective :  Temp:  [99 °F (37.2 °C)] 99 °F (37.2 °C)  HR:  [76-92] 86  BP: (91-99)/(49-62) 94/51  Resp:  [16-18]  18  SpO2:  [97 %-98 %] 98 %  O2 Device: None (Room air)    Physical Exam  Vitals and nursing note reviewed.   Constitutional:       General: She is not in acute distress.     Appearance: She is well-developed. She is ill-appearing.   HENT:      Head: Normocephalic and atraumatic.      Mouth/Throat:      Mouth: Mucous membranes are dry.   Eyes:      Conjunctiva/sclera: Conjunctivae normal.   Cardiovascular:      Rate and Rhythm: Regular rhythm. Tachycardia present.      Heart sounds: No murmur heard.  Pulmonary:      Effort: Pulmonary effort is normal. No respiratory distress.      Breath sounds: Normal breath sounds.   Abdominal:      Palpations: Abdomen is soft.      Tenderness: There is no abdominal tenderness. There is no guarding or rebound.   Musculoskeletal:      Cervical back: Neck supple.      Right lower leg: No edema.      Left lower leg: No edema.   Skin:     General: Skin is warm and dry.      Capillary Refill: Capillary refill takes less than 2 seconds.   Neurological:      General: No focal deficit present.      Mental Status: She is alert and oriented to person, place, and time.   Psychiatric:         Mood and Affect: Mood normal.          Lines/Drains:      Lab Results: I have reviewed the following results:  Results from last 7 days   Lab Units 11/10/24  1208   WBC Thousand/uL 17.29*   HEMOGLOBIN g/dL 12.2   HEMATOCRIT % 38.6   PLATELETS Thousands/uL 251   SEGS PCT % 90*   LYMPHO PCT % 3*   MONO PCT % 6   EOS PCT % 0     Results from last 7 days   Lab Units 11/10/24  1208   SODIUM mmol/L 131*   POTASSIUM mmol/L 3.4*   CHLORIDE mmol/L 96   CO2 mmol/L 24   BUN mg/dL 26*   CREATININE mg/dL 1.30   ANION GAP mmol/L 11   CALCIUM mg/dL 8.8   ALBUMIN g/dL 3.7   TOTAL BILIRUBIN mg/dL 1.96*   ALK PHOS U/L 75   ALT U/L 13   AST U/L 19   GLUCOSE RANDOM mg/dL 206*             Lab Results   Component Value Date    HGBA1C 7.4 (H) 07/10/2024    HGBA1C 9.6 (H) 01/03/2024    HGBA1C 8.7 (H) 08/21/2023     Results from  last 7 days   Lab Units 11/10/24  1421 11/10/24  1208   LACTIC ACID mmol/L 0.7  --    PROCALCITONIN ng/ml  --  3.70*       Imaging Results Review: I reviewed radiology reports from this admission including: CT chest and CT abdomen/pelvis.      Administrative Statements   I have spent a total time of 75 minutes in caring for this patient on the day of the visit/encounter including Diagnostic results, Counseling / Coordination of care, Documenting in the medical record, Reviewing / ordering tests, medicine, procedures  , Obtaining or reviewing history  , and Communicating with other healthcare professionals .    ** Please Note: This note has been constructed using a voice recognition system. **

## 2024-11-10 NOTE — ASSESSMENT & PLAN NOTE
Meeting criteria due to tachycardia/leukocytosis with suspected UTI  Lactate 0.7, initial Pro-Renan 3.70, continue to trend  UA positive, follow-up urine culture  Follow-up blood culture  Flu RSV COVID-negative  CT chest abdomen pelvis with no acute infectious findings  Started empirically on IV ceftriaxone  2 L IV fluid bolus given in ED, continue with maintenance fluids.

## 2024-11-10 NOTE — QUICK NOTE
On CT Chest/abdomen/pelvis multiple incidental findings :  Irregular opacity in the right upper lobe and a few additional pulmonary nodules with the largest being 7 mm in the left lower lobe. Recommend 3-month follow-up chest CT.   Indeterminate 1.2 cm exophytic right renal lesion. Focal intrahepatic biliary ductal dilation in the right hepatic lobe. Possibly related to prior cholecystectomy but cannot exclude an underlying lesion.Multiple pancreatic cysts have mildly enlarged from 2019, largest measuring 2.6 cm in the pancreatic head.Recommend outpatient contrast-enhanced abdominal MRI and MRCP for further evaluation of these solid organ findings.  Bilateral inguinal lymphadenopathy is nonspecific. This was present to at least some extent in 2019 but at least one right inguinal node has since enlarged. Clinical follow-up advised and correlation for any lower extremity or pelvic inflammation. A 6-month ultrasound could be considered.          Above findings dw patient, she understands she needs to fu op for the same

## 2024-11-10 NOTE — ASSESSMENT & PLAN NOTE
"Lab Results   Component Value Date    HGBA1C 7.4 (H) 07/10/2024       No results for input(s): \"POCGLU\" in the last 72 hours.    Blood Sugar Average: Last 72 hrs:    Holding home Jardiance/Glyxambi  Patient placed on sliding scale coverage with ACHS checks  Consistent carb diet  Hypoglycemia protocol.  "

## 2024-11-10 NOTE — ASSESSMENT & PLAN NOTE
Preventive Health Recommendations  Male Ages 26 - 39    Yearly exam:             See your health care provider every year in order to  o   Review health changes.   o   Discuss preventive care.    o   Review your medicines if your doctor has prescribed any.    You should be tested each year for STDs (sexually transmitted diseases), if you re at risk.     After age 35, talk to your provider about cholesterol testing. If you are at risk for heart disease, have your cholesterol tested at least every 5 years.     If you are at risk for diabetes, you should have a diabetes test (fasting glucose).  Shots: Get a flu shot each year. Get a tetanus shot every 10 years.     Nutrition:    Eat at least 5 servings of fruits and vegetables daily.     Eat whole-grain bread, whole-wheat pasta and brown rice instead of white grains and rice.     Get adequate Calcium and Vitamin D.     Lifestyle    Exercise for at least 150 minutes a week (30 minutes a day, 5 days a week). This will help you control your weight and prevent disease.     Limit alcohol to one drink per day.     No smoking.     Wear sunscreen to prevent skin cancer.     See your dentist every six months for an exam and cleaning.      Coming in with generalized weakness/poor oral intake/lightheadedness/chills    Likely in the setting of UTI  Patient also noted to be hypotensive in the ER with systolic blood pressure readings in the 90s.  Patient receiving empiric IV fluids/IV antibiotics  Hold home antihypertensive agents.  PT DANNI negrete requested

## 2024-11-11 ENCOUNTER — APPOINTMENT (INPATIENT)
Dept: VASCULAR ULTRASOUND | Facility: HOSPITAL | Age: 79
DRG: 872 | End: 2024-11-11
Payer: COMMERCIAL

## 2024-11-11 PROBLEM — R78.81 BACTEREMIA DUE TO GRAM-POSITIVE BACTERIA: Status: ACTIVE | Noted: 2024-11-11

## 2024-11-11 PROBLEM — B95.5 STREPTOCOCCAL BACTEREMIA: Status: ACTIVE | Noted: 2024-11-11

## 2024-11-11 PROBLEM — I87.2 VENOUS STASIS DERMATITIS OF BOTH LOWER EXTREMITIES: Status: ACTIVE | Noted: 2024-11-11

## 2024-11-11 LAB
ALBUMIN SERPL BCG-MCNC: 3.4 G/DL (ref 3.5–5)
ALP SERPL-CCNC: 68 U/L (ref 34–104)
ALT SERPL W P-5'-P-CCNC: 16 U/L (ref 7–52)
ANION GAP SERPL CALCULATED.3IONS-SCNC: 12 MMOL/L (ref 4–13)
AST SERPL W P-5'-P-CCNC: 25 U/L (ref 13–39)
BACTERIA UR CULT: NORMAL
BASOPHILS # BLD AUTO: 0.05 THOUSANDS/ÂΜL (ref 0–0.1)
BASOPHILS NFR BLD AUTO: 0 % (ref 0–1)
BILIRUB SERPL-MCNC: 1.14 MG/DL (ref 0.2–1)
BUN SERPL-MCNC: 23 MG/DL (ref 5–25)
CALCIUM ALBUM COR SERPL-MCNC: 8.3 MG/DL (ref 8.3–10.1)
CALCIUM SERPL-MCNC: 7.8 MG/DL (ref 8.4–10.2)
CHLORIDE SERPL-SCNC: 100 MMOL/L (ref 96–108)
CO2 SERPL-SCNC: 21 MMOL/L (ref 21–32)
CREAT SERPL-MCNC: 1.39 MG/DL (ref 0.6–1.3)
EOSINOPHIL # BLD AUTO: 0.05 THOUSAND/ÂΜL (ref 0–0.61)
EOSINOPHIL NFR BLD AUTO: 0 % (ref 0–6)
ERYTHROCYTE [DISTWIDTH] IN BLOOD BY AUTOMATED COUNT: 15.2 % (ref 11.6–15.1)
EST. AVERAGE GLUCOSE BLD GHB EST-MCNC: 177 MG/DL
GFR SERPL CREATININE-BSD FRML MDRD: 36 ML/MIN/1.73SQ M
GLUCOSE SERPL-MCNC: 130 MG/DL (ref 65–140)
GLUCOSE SERPL-MCNC: 134 MG/DL (ref 65–140)
GLUCOSE SERPL-MCNC: 204 MG/DL (ref 65–140)
GLUCOSE SERPL-MCNC: 205 MG/DL (ref 65–140)
GLUCOSE SERPL-MCNC: 212 MG/DL (ref 65–140)
HBA1C MFR BLD: 7.8 %
HCT VFR BLD AUTO: 32.8 % (ref 34.8–46.1)
HGB BLD-MCNC: 10.5 G/DL (ref 11.5–15.4)
IMM GRANULOCYTES # BLD AUTO: 0.13 THOUSAND/UL (ref 0–0.2)
IMM GRANULOCYTES NFR BLD AUTO: 1 % (ref 0–2)
LYMPHOCYTES # BLD AUTO: 0.41 THOUSANDS/ÂΜL (ref 0.6–4.47)
LYMPHOCYTES NFR BLD AUTO: 3 % (ref 14–44)
MCH RBC QN AUTO: 27.1 PG (ref 26.8–34.3)
MCHC RBC AUTO-ENTMCNC: 32 G/DL (ref 31.4–37.4)
MCV RBC AUTO: 85 FL (ref 82–98)
MONOCYTES # BLD AUTO: 0.66 THOUSAND/ÂΜL (ref 0.17–1.22)
MONOCYTES NFR BLD AUTO: 5 % (ref 4–12)
NEUTROPHILS # BLD AUTO: 13 THOUSANDS/ÂΜL (ref 1.85–7.62)
NEUTS SEG NFR BLD AUTO: 91 % (ref 43–75)
NRBC BLD AUTO-RTO: 0 /100 WBCS
PLATELET # BLD AUTO: 178 THOUSANDS/UL (ref 149–390)
PMV BLD AUTO: 9.6 FL (ref 8.9–12.7)
POTASSIUM SERPL-SCNC: 3.5 MMOL/L (ref 3.5–5.3)
PROCALCITONIN SERPL-MCNC: 41.52 NG/ML
PROT SERPL-MCNC: 6.2 G/DL (ref 6.4–8.4)
RBC # BLD AUTO: 3.87 MILLION/UL (ref 3.81–5.12)
S PYO DNA BLD POS QL NAA+NON-PROBE: DETECTED
SODIUM SERPL-SCNC: 133 MMOL/L (ref 135–147)
WBC # BLD AUTO: 14.3 THOUSAND/UL (ref 4.31–10.16)

## 2024-11-11 PROCEDURE — 85025 COMPLETE CBC W/AUTO DIFF WBC: CPT | Performed by: FAMILY MEDICINE

## 2024-11-11 PROCEDURE — 97162 PT EVAL MOD COMPLEX 30 MIN: CPT

## 2024-11-11 PROCEDURE — 99223 1ST HOSP IP/OBS HIGH 75: CPT | Performed by: INTERNAL MEDICINE

## 2024-11-11 PROCEDURE — 83036 HEMOGLOBIN GLYCOSYLATED A1C: CPT | Performed by: FAMILY MEDICINE

## 2024-11-11 PROCEDURE — 84145 PROCALCITONIN (PCT): CPT | Performed by: FAMILY MEDICINE

## 2024-11-11 PROCEDURE — 93970 EXTREMITY STUDY: CPT

## 2024-11-11 PROCEDURE — 92610 EVALUATE SWALLOWING FUNCTION: CPT

## 2024-11-11 PROCEDURE — 80053 COMPREHEN METABOLIC PANEL: CPT | Performed by: FAMILY MEDICINE

## 2024-11-11 PROCEDURE — 82948 REAGENT STRIP/BLOOD GLUCOSE: CPT

## 2024-11-11 PROCEDURE — 97165 OT EVAL LOW COMPLEX 30 MIN: CPT

## 2024-11-11 PROCEDURE — 99232 SBSQ HOSP IP/OBS MODERATE 35: CPT

## 2024-11-11 RX ORDER — ALBUMIN (HUMAN) 12.5 G/50ML
12.5 SOLUTION INTRAVENOUS ONCE
Status: COMPLETED | OUTPATIENT
Start: 2024-11-11 | End: 2024-11-11

## 2024-11-11 RX ORDER — OXYCODONE HYDROCHLORIDE 5 MG/1
5 TABLET ORAL EVERY 6 HOURS PRN
Status: DISCONTINUED | OUTPATIENT
Start: 2024-11-11 | End: 2024-11-18 | Stop reason: HOSPADM

## 2024-11-11 RX ADMIN — INSULIN LISPRO 1 UNITS: 100 INJECTION, SOLUTION INTRAVENOUS; SUBCUTANEOUS at 13:06

## 2024-11-11 RX ADMIN — ALBUMIN (HUMAN) 12.5 G: 0.25 INJECTION, SOLUTION INTRAVENOUS at 00:49

## 2024-11-11 RX ADMIN — ACETAMINOPHEN 650 MG: 325 TABLET ORAL at 10:02

## 2024-11-11 RX ADMIN — LEVOTHYROXINE SODIUM 125 MCG: 125 TABLET ORAL at 04:56

## 2024-11-11 RX ADMIN — PRAVASTATIN SODIUM 40 MG: 40 TABLET ORAL at 17:10

## 2024-11-11 RX ADMIN — INSULIN LISPRO 1 UNITS: 100 INJECTION, SOLUTION INTRAVENOUS; SUBCUTANEOUS at 17:11

## 2024-11-11 RX ADMIN — APIXABAN 5 MG: 5 TABLET, FILM COATED ORAL at 10:02

## 2024-11-11 RX ADMIN — APIXABAN 5 MG: 5 TABLET, FILM COATED ORAL at 17:09

## 2024-11-11 RX ADMIN — OXYCODONE HYDROCHLORIDE 5 MG: 5 TABLET ORAL at 17:09

## 2024-11-11 RX ADMIN — CEFTRIAXONE SODIUM 1000 MG: 10 INJECTION, POWDER, FOR SOLUTION INTRAVENOUS at 10:03

## 2024-11-11 NOTE — ASSESSMENT & PLAN NOTE
Lab Results   Component Value Date    HGBA1C 7.8 (H) 11/11/2024     Recent Labs     11/10/24  2051 11/11/24  0750 11/11/24  1103 11/11/24  1559   POCGLU 253* 134 212* 205*   This is a risk factor for infection.    - Maintain euglycemia to improve WBC function and improve wound healing   - Needs regular Podiatry follow up for foot care

## 2024-11-11 NOTE — PLAN OF CARE
Problem: PAIN - ADULT  Goal: Verbalizes/displays adequate comfort level or baseline comfort level  Description: Interventions:  - Encourage patient to monitor pain and request assistance  - Assess pain using appropriate pain scale  - Administer analgesics based on type and severity of pain and evaluate response  - Implement non-pharmacological measures as appropriate and evaluate response  - Consider cultural and social influences on pain and pain management  - Notify physician/advanced practitioner if interventions unsuccessful or patient reports new pain  Outcome: Progressing     Problem: INFECTION - ADULT  Goal: Absence or prevention of progression during hospitalization  Description: INTERVENTIONS:  - Assess and monitor for signs and symptoms of infection  - Monitor lab/diagnostic results  - Monitor all insertion sites, i.e. indwelling lines, tubes, and drains  - Monitor endotracheal if appropriate and nasal secretions for changes in amount and color  - [place] appropriate cooling/warming therapies per order  - Administer medications as ordered  - Instruct and encourage patient and family to use good hand hygiene technique  - Identify and instruct in appropriate isolation precautions for identified infection/condition  Outcome: Progressing  Goal: Absence of fever/infection during neutropenic period  Description: INTERVENTIONS:  - Monitor WBC    Outcome: Progressing     Problem: SAFETY ADULT  Goal: Maintain or return to baseline ADL function  Description: INTERVENTIONS:  -  Assess patient's ability to carry out ADLs; assess patient's baseline for ADL function and identify physical deficits which impact ability to perform ADLs (bathing, care of mouth/teeth, toileting, grooming, dressing, etc.)  - Assess/evaluate cause of self-care deficits   - Assess range of motion  - Assess patient's mobility; develop plan if impaired  - Assess patient's need for assistive devices and provide as appropriate  - Encourage  maximum independence but intervene and supervise when necessary  - Involve family in performance of ADLs  - Assess for home care needs following discharge   - Consider OT consult to assist with ADL evaluation and planning for discharge  - Provide patient education as appropriate  Outcome: Progressing     Problem: DISCHARGE PLANNING  Goal: Discharge to home or other facility with appropriate resources  Description: INTERVENTIONS:  - Identify barriers to discharge w/patient and caregiver  - Arrange for needed discharge resources and transportation as appropriate  - Identify discharge learning needs (meds, wound care, etc.)  - Arrange for interpretive services to assist at discharge as needed  - Refer to Case Management Department for coordinating discharge planning if the patient needs post-hospital services based on physician/advanced practitioner order or complex needs related to functional status, cognitive ability, or social support system  Outcome: Progressing     Problem: Knowledge Deficit  Goal: Patient/family/caregiver demonstrates understanding of disease process, treatment plan, medications, and discharge instructions  Description: Complete learning assessment and assess knowledge base.  Interventions:  - Provide teaching at level of understanding  - Provide teaching via preferred learning methods  Outcome: Progressing

## 2024-11-11 NOTE — PLAN OF CARE
Problem: OCCUPATIONAL THERAPY ADULT  Goal: Performs self-care activities at highest level of function for planned discharge setting.  See evaluation for individualized goals.  Description: Treatment Interventions: ADL retraining, Functional transfer training, Patient/family training, Activityengagement, Continued evaluation          See flowsheet documentation for full assessment, interventions and recommendations.   Note: Limitation: Decreased high-level ADLs  Prognosis: Good  Assessment: Pt is a 79 y.o. female seen for OT evaluation s/p admit to [facility] on 11/10/2024 w/ Sepsis (HCC). Comorbidities affecting pt's functional performance at time of assessment include:Afib, anemia, CKD, DM, and HTN . Orders placed for OT evaluation and treatment.  Performed at least two patient identifiers during session including name and wristband. Personal factors affecting pt at time of IE include:steps to enter environment, difficulty performing IADLS , and environment. Prior to admission, pt reports Ind with ADLs, Ind with IADLs, and (+) driving.  Upon evaluation: Pt requires MI with UB ADLs, MI with LB ADLs, S with xfers and S with functional mobility 2* the following deficits impacting occupational performance: decreased standing tolerance time for self care and functional mobility, decreased mobilty, and requiring external assistance to complete transitional movements. Pt did not demonstrate acute care skilled OT needs at this time. From OT standpoint, recommendation at time of d/c would be No Post-Acute Rehab Needs.     Rehab Resource Intensity Level, OT: No post-acute rehabilitation needs

## 2024-11-11 NOTE — OCCUPATIONAL THERAPY NOTE
Occupational Therapy Evaluation      Jenny Galarza    11/11/2024    Principal Problem:    Sepsis (HCC)  Active Problems:    Hypothyroidism    Hyperlipidemia, mixed    Diabetes mellitus, type 2 (HCC)    Afib (HCC)    Muscle weakness (generalized)      Past Medical History:   Diagnosis Date    A-fib (HCC)     Anemia     Chronic kidney disease     Diabetes mellitus (HCC)     Hypertension        Past Surgical History:   Procedure Laterality Date    CATARACT EXTRACTION, BILATERAL      CHOLECYSTECTOMY      GALLBLADDER SURGERY      HERNIA REPAIR      REPLACEMENT TOTAL KNEE      TONSILLECTOMY         11/11/24 0940   OT Last Visit   OT Visit Date 11/11/24   Note Type   Note type Evaluation   Pain Assessment   Pain Assessment Tool 0-10   Pain Score 8   Pain Location/Orientation Orientation: Right;Location: Leg  (x1 week, worsens with mobility)   Hospital Pain Intervention(s)   (RN made aware of pt's pain report)   Restrictions/Precautions   Weight Bearing Precautions Per Order No   Other Precautions Fall Risk;Pain   Home Living   Type of Home House   Home Layout Two level;Bed/bath upstairs;Stairs to enter with rails;1/2 bath on main level  (5STE)   Bathroom Shower/Tub Walk-in shower   Bathroom Toilet Standard   Bathroom Equipment   (none per pt)   Bathroom Accessibility Accessible   Home Equipment   (no DME at baseline)   Prior Function   Level of [place] Independent with ADLs;Independent with functional mobility;Independent with IADLS   Lives With Other (Comment)  (sister and niece)   Receives Help From Family  (3 sons, 2 grandchildren)   IADLs Independent with driving;Independent with meal prep;Independent with medication management  (cleaning and laundry with Ind)   Falls in the last 6 months 1 to 4  (2 - this one and 1 outside on deck)   Vocational Retired  ([employer])   Lifestyle   Autonomy Pt reports PLOF was Ind with ADLs, IADLs, and (+) driving   Reciprocal Relationships sister and niece   ADL   Eating  Assistance 6  Modified independent   Eating Deficit Increased time to complete   Grooming Assistance 6  Modified Independent   Grooming Deficit Increased time to complete   UB Bathing Assistance 6  Modified Independent   UB Bathing Deficit Increased time to complete   LB Bathing Assistance 6  Modified Independent   LB Bathing Deficit Increased time to complete   UB Dressing Assistance 6  Modified independent   UB Dressing Deficit Increased time to complete   LB Dressing Assistance 6  Modified independent   LB Dressing Deficit Increased time to complete   Toileting Assistance  6  Modified independent   Toileting Deficit Increased time to complete   Functional Assistance 6  Modified independent   Functional Deficit Increased time to complete   Bed Mobility   Additional Comments Pt was oob to bathroom when OT arrived.   Transfers   Sit to Stand 5  Supervision   Additional items Assist x 1;Increased time required   Stand to Sit 5  Supervision   Additional items Assist x 1;Increased time required   Functional Mobility   Functional Mobility 5  Supervision   Additional items Rolling walker  (and without AD)   Balance   Static Sitting Good   Dynamic Sitting Fair +   Static Standing Fair +   Dynamic Standing Fair   Ambulatory Fair   Activity Tolerance   Activity Tolerance Patient limited by pain   RUE Assessment   RUE Assessment   (functionally assessed: ROM WFL/ 4/5)   LUE Assessment   LUE Assessment   (functionally assessed: ROM WFL/ 4/5)   Hand Function   Gross Motor Coordination Functional   Fine Motor Coordination Functional   Sensation   Light Touch No apparent deficits   Sharp/Dull No apparent deficits   Stereognosis No apparent deficits   Proprioception   Proprioception No apparent deficits   Vision-Basic Assessment   Current Vision No visual deficits   Vision - Complex Assessment   Ocular Range of Motion Intact   Psychosocial   Psychosocial (WDL) WDL   Perception   Inattention/Neglect Appears intact   Cognition    Overall Cognitive Status WFL   Arousal/Participation Alert;Cooperative   Attention Within functional limits   Orientation Level Oriented X4   Memory Within functional limits   Following Commands Follows all commands and directions without difficulty   Comments Pt was agreeable to OT eval   Assessment   Limitation Decreased high-level ADLs   Prognosis Good   Assessment Pt is a 79 y.o. female seen for OT evaluation s/p admit to [facility] on 11/10/2024 w/ Sepsis (HCC). Comorbidities affecting pt's functional performance at time of assessment include:Afib, anemia, CKD, DM, and HTN . Orders placed for OT evaluation and treatment.  Performed at least two patient identifiers during session including name and wristband. Personal factors affecting pt at time of IE include:steps to enter environment, difficulty performing IADLS , and environment. Prior to admission, pt reports Ind with ADLs, Ind with IADLs, and (+) driving.  Upon evaluation: Pt requires MI with UB ADLs, MI with LB ADLs, S with xfers and S with functional mobility 2* the following deficits impacting occupational performance: decreased standing tolerance time for self care and functional mobility, decreased mobilty, and requiring external assistance to complete transitional movements. Pt did not demonstrate acute care skilled OT needs at this time. From OT standpoint, recommendation at time of d/c would be No Post-Acute Rehab Needs.   Plan   Treatment Interventions ADL retraining;Functional transfer training;Patient/family training;Activityengagement;Continued evaluation   Goal Expiration Date 11/11/24   OT Frequency Eval only   Discharge Recommendation   Rehab Resource Intensity Level, OT No post-acute rehabilitation needs   AM-PAC Daily Activity Inpatient   Lower Body Dressing 4   Bathing 4   Toileting 4   Upper Body Dressing 4   Grooming 4   Eating 4   Daily Activity Raw Score 24   Daily Activity Standardized Score (Calc for Raw Score >=11) 57.54    AM-PAC Applied Cognition Inpatient   Following a Speech/Presentation 4   Understanding Ordinary Conversation 4   Taking Medications 4   Remembering Where Things Are Placed or Put Away 4   Remembering List of 4-5 Errands 4   Taking Care of Complicated Tasks 4   Applied Cognition Raw Score 24   Applied Cognition Standardized Score 62.21     Anay Bosch MS OTR/L

## 2024-11-11 NOTE — PLAN OF CARE
Problem: PHYSICAL THERAPY ADULT  Goal: Performs mobility at highest level of function for planned discharge setting.  See evaluation for individualized goals.  Description: Treatment/Interventions: LE strengthening/ROM, Elevations, Therapeutic exercise, Endurance training, Equipment eval/education, Gait training, Spoke to nursing, OT  Equipment Recommended: Walker (RW)       See flowsheet documentation for full assessment, interventions and recommendations.  Note: Prognosis: Good  Problem List: Decreased strength, Decreased endurance, Impaired balance, Decreased mobility, Pain  Assessment: Pt is 79 y.o. female seen for PT evaluation on 11/11/2024 s/p admit to [facility] on 11/10/2024 w/ Sepsis (HCC). PT was consulted to assess pt's functional mobility and d/c needs. Order placed for PT eval and tx. PTA, pt lives with her sister and niece in [facility] with 5 GENET, ambulates without AD. At time of eval, pt performing transfers and level surface gait trial SBA. Upon evaluation, pt presenting with impaired functional mobility d/t decreased strength, decreased endurance, impaired balance, decreased mobility, and pain. Pertinent PMHx and current co-morbidities affecting pt's physical performance at time of assessment include: sepsis, hypothyroidism, HLD, type 2 DM, a fib, muscle weakness, stage III CKD, gout, anemia, CKD-mineral and bone disorder. Personal factors affecting pt at time of eval include: lives in 2 story house, ambulating w/ assistive device, stairs to enter home, and positive fall history. The following objective measures performed on IE also reveal limitations: Barthel Index: 80/100, Modified Cammal: 2 (slight disability), and AM-PAC 6-Clicks: 20/24. Pt's clinical presentation is currently evolving seen in pt's presentation of abnormal lab value(s) and ongoing medical assessment. Overall, pt's rehab potential and prognosis to return to PLOF is good as impacted by objective findings, warranting pt to  receive further skilled PT interventions to address identified impairments, activity limitation(s), and participation restriction(s). Pt to benefit from continued PT tx to address deficits as defined above and maximize level of functional independent mobility. From PT/mobility standpoint, recommend level 3, minimal resource intensity in order to facilitate return to PLOF.  Barriers to Discharge: None     Rehab Resource Intensity Level, PT: III (Minimum Resource Intensity)    See flowsheet documentation for full assessment.

## 2024-11-11 NOTE — CASE MANAGEMENT
Case Management Assessment & Discharge Planning Note    Patient name Jenny Galarza  Location /-01 MRN [id number]  : 1945 Date 2024       Current Admission Date: 11/10/2024  Current Admission Diagnosis:Sepsis (HCC)   Patient Active Problem List    Diagnosis Date Noted Date Diagnosed    Sepsis (HCC) 11/10/2024     Muscle weakness (generalized) 11/10/2024     Intractable nausea and vomiting 2019     Chronic kidney disease-mineral and bone disorder 10/07/2019     Benign essential hypertension 2019     Hyperkalemia 2017     Gout 2016     Stage III chronic kidney disease (HCC) 2016     Afib (HCC) 2016     Hypothyroidism 2014     Hypertension 2014     Hyperlipidemia, mixed 10/13/2014     Diabetes mellitus, type 2 (HCC) 10/13/2014     Anemia 10/13/2014       LOS (days): 1  Geometric Mean LOS (GMLOS) (days): 3.5  Days to GMLOS:2.6     OBJECTIVE:    Risk of Unplanned Readmission Score: 13.49         Current admission status: Inpatient       Preferred Pharmacy:   [id number] [place] - [address]  [address]  [facility] [address]  Phone: [phone] Fax: [phone]    Primary Care Provider: Kathya Irene DO    Primary Insurance: [facility] REP  Secondary Insurance:     ASSESSMENT:  Active Health Care Proxies    There are no active Health Care Proxies on file.       Advance Directives  Does patient have a Health Care POA?: No  Was patient offered paperwork?: Yes  Does patient currently have a Health Care decision maker?: No  Does patient have Advance Directives?: No  Was patient offered paperwork?: Yes  Primary Contact: Shawn, laura         Readmission Root Cause  30 Day Readmission: No    Patient Information  Admitted from:: Home  Mental Status: Alert  During Assessment patient was accompanied by: Not accompanied during assessment  Assessment information provided by:: Patient  Primary Caregiver:  Self  Support Systems: Family members, Son  County of Residence: [place]  What city do you live in?: [place]  Home entry access options. Select all that apply.: Stairs  Number of steps to enter home.: 5  Do the steps have railings?: Yes  Type of Current Residence: 2 story home  Upon entering residence, is there a bedroom on the main floor (no further steps)?: No  A bedroom is located on the following floor levels of residence (select all that apply):: 2nd Floor  Upon entering residence, is there a bathroom on the main floor (no further steps)?: Yes (1/2 bath)  Number of steps to 2nd floor from main floor: One Flight  Living Arrangements: Lives w/ Family members (lives with sister and niece)  Is patient a ?: No    Activities of Daily Living Prior to Admission  Functional Status: Independent  Completes ADLs independently?: Yes  Ambulates independently?: Yes  Does patient use assisted devices?: No  Does patient currently own DME?: No  Does patient have a history of Outpatient Therapy (PT/OT)?: No  Does the patient have a history of Short-Term Rehab?: No  Does patient have a history of HHC?: No  Does patient currently have HHC?: No         Patient Information Continued  Income Source: SSI/SSD  Does patient have prescription coverage?: Yes  Does patient receive dialysis treatments?: No  Does patient have a history of substance abuse?: No  Does patient have a history of Mental Health Diagnosis?: No         Means of Transportation  Means of Transport to Appts:: Drives Self      Social Determinants of Health (SDOH)      Flowsheet Row Most Recent Value   Housing Stability    In the last 12 months, was there a time when you were not able to pay the mortgage or rent on time? N   In the past 12 months, how many times have you moved where you were living? 0   At any time in the past 12 months, were you homeless or living in a shelter (including now)? N   Transportation Needs    In the past 12 months, has lack of  transportation kept you from medical appointments or from getting medications? no   In the past 12 months, has lack of transportation kept you from meetings, work, or from getting things needed for daily living? No   Food Insecurity    Within the past 12 months, you worried that your food would run out before you got the money to buy more. Never true   Within the past 12 months, the food you bought just didn't last and you didn't have money to get more. Never true   Utilities    In the past 12 months has the electric, gas, oil, or water company threatened to shut off services in your home? No            DISCHARGE DETAILS:    Discharge planning discussed with:: Patient at the bedside  Freedom of Choice: Yes  Comments - Freedom of Choice: FOC maintained in discussion regarding discharge planning. Patient is alert oriented and competent to make decisions. Introduced self and role, explained role of CM in arranging services such as DME, STR, HHC, and providing community resource information. Discussed current living situation and needs at discharge. Patient is IPTA. CM offered HHC, STR, DME, PCP, OP CM, community resources. Patient notes that her eliquis and DM medication Glax (?) are very expensive; accepted assistance with her 2 medications that are costing her over $400/ month each. Does not use DME. Pt is aware and encouraged to seek CM for any questions or concerns. CM continues to follow.  CM contacted family/caregiver?: No- see comments  Were Treatment Team discharge recommendations reviewed with patient/caregiver?: Yes  Did patient/caregiver verbalize understanding of patient care needs?: Yes  Were patient/caregiver advised of the risks associated with not following Treatment Team discharge recommendations?: Yes    Contacts  Patient Contacts: Shawn, son  Relationship to Patient:: Family  Reason/Outcome: Emergency Contact    Requested Home Health Care         Is the patient interested in HHC at discharge?:  No    DME Referral Provided  Referral made for DME?: No    Other Referral/Resources/Interventions Provided:  Interventions: Prescription Price Check  Referral Comments: CM will review medications and patient assistance programs for them. Will update SLIM on cost of these medications for patient. CM will continue to follow for needs.    Would you like to participate in our [facility] service program?  : No - Declined    Treatment Team Recommendation: Home  Discharge Destination Plan:: Home  Transport at Discharge : Family

## 2024-11-11 NOTE — ASSESSMENT & PLAN NOTE
Patient went 1/2 blood culture positive for Streptococcus pyogenes, unclear source of infection.  Per infectious disease recommending to continue with IV ceftriaxone  Recommended Doppler ultrasound to rule out septic thrombophlebitis

## 2024-11-11 NOTE — ASSESSMENT & PLAN NOTE
Some episodes of uncontrolled rate likely in the setting of infection.  Should continue to improve as infection is treated.  - Management otherwise per primary team

## 2024-11-11 NOTE — ASSESSMENT & PLAN NOTE
Lab Results   Component Value Date    HGBA1C 7.8 (H) 11/11/2024       Recent Labs     11/10/24  2051 11/11/24  0750 11/11/24  1103 11/11/24  1559   POCGLU 253* 134 212* 205*       Blood Sugar Average: Last 72 hrs:  (P) 193.8  Holding home Jardiance/Glyxambi  Patient placed on sliding scale coverage with ACHS checks  Consistent carb diet  Hypoglycemia protocol.

## 2024-11-11 NOTE — CONSULTS
Consultation - Infectious Disease   Name: Jenny Galarza 79 y.o. female I MRN: [id number]  Unit/Bed#: -01 I Date of Admission: 11/10/2024   Date of Service: 11/11/2024 I Hospital Day: 1   Inpatient consult to Infectious Diseases  Consult performed by: Pete Lion MD  Consult ordered by: Karol Moreno MD        Physician Requesting Evaluation: Karol Moreno MD   Reason for Evaluation / Principal Problem: GAS bacteremia      Assessment & Plan  Sepsis (HCC)  As evidenced by fever and leukocytosis.  Due to group A strep bacteremia.  Improving on appropriate antibiotics.  - Continue antibiotics and further management as below  - Trend fever, WBC count, vitals  - Additional supportive care per primary team  Streptococcal bacteremia  Blood cultures on 11/10 isolated group A Streptococcus.  Likely source is lower extremity stasis dermatitis.  - Continue ceftriaxone 2 g daily  - Follow BMP and CBCD to monitor for medication toxicities and treatment response  - Anticipate total 14-day antibiotic course, can likely finish with oral antibiotics after further clinical improvement  - No need to repeat blood cultures or check echo, as this organism carries a very low risk of endovascular infection and patient otherwise improving as expected  Venous stasis dermatitis of both lower extremities  Likely source of bacteremia.  No overt signs of cellulitis, but she does have tenderness and indurated skin, as well as multiple areas of dry cracked skin.  There is weeping superficial wound on the left leg.  Long history of venous insufficiency.  - Antibiotics as above  - Needs risk factor reduction: [place] use of emollients to dry cracked skin, aggressive leg elevation, compression or Ace wrap as tolerated  Diabetes mellitus, type 2 (HCC)  Lab Results   Component Value Date    HGBA1C 7.8 (H) 11/11/2024     Recent Labs     11/10/24  2051 11/11/24  0750 11/11/24  1103 11/11/24  1559   POCGLU 253* 134 212* 205*   This is  a risk factor for infection.    - Maintain euglycemia to improve WBC function and improve wound healing   - Needs regular Podiatry follow up for foot care  Afib (HCC)  Some episodes of uncontrolled rate likely in the setting of infection.  Should continue to improve as infection is treated.  - Management otherwise per primary team  Stage III chronic kidney disease (HCC)  Lab Results   Component Value Date    EGFR 36 11/11/2024    EGFR 39 11/10/2024    EGFR 48 (L) 07/10/2024    CREATININE 1.39 (H) 11/11/2024    CREATININE 1.30 11/10/2024    CREATININE 1.16 (H) 07/10/2024   Estimated Creatinine Clearance: 33.8 mL/min (A) (by C-G formula based on SCr of 1.39 mg/dL (H)).   - No adjustment needed for ceftriaxone  - Dose adjust other medications       I have discussed the above management plan in detail with the primary service, Dr. Moreno, who agrees with the plan to continue ceftriaxone and obtain lower extremity Dopplers.    ID consult service will continue to follow along with you.    I have performed an extensive review of the medical records in Epic including review of the notes, radiographs, and laboratory results.    HISTORY OF PRESENT ILLNESS:    HPI: Jenny Galarza is a 79 y.o. female with PMH of A-fib, HTN, T2DM, and CKD who presented on 11/10/2024 with 1 day of acute onset of chills.  She reports symptoms starting Saturday morning.  She was feeling weak, feverish, shaking uncontrollable chills and could not get warm.  She denies cough, shortness of breath, painful rashes, joint pains, abdominal pain, diarrhea, dysuria.  She had no recent procedures involving needles or breaks in the skin including joint injections.  She has bilateral knee arthroplasties without new/worsening symptoms.  Her son, present at bedside, reports that she has had lower extremity swelling for years.  She has a history of vein surgery in the right leg.  She worked for many years and has a , on her feet all day.    REVIEW  OF SYSTEMS:  A complete review of systems is negative other than that noted in the HPI.    PAST MEDICAL HISTORY:  Past Medical History:   Diagnosis Date    A-fib (HCC)     Anemia     Chronic kidney disease     Diabetes mellitus (HCC)     Hypertension      Past Surgical History:   Procedure Laterality Date    CATARACT EXTRACTION, BILATERAL      CHOLECYSTECTOMY      GALLBLADDER SURGERY      HERNIA REPAIR      REPLACEMENT TOTAL KNEE      TONSILLECTOMY         FAMILY HISTORY:  Non-contributory     SOCIAL HISTORY:  Social History   Social History     Substance and Sexual Activity   Alcohol Use Never     Social History     Substance and Sexual Activity   Drug Use No     Social History     Tobacco Use   Smoking Status Former   Smokeless Tobacco Former       ALLERGIES:  Allergies   Allergen Reactions    Amoxicillin-Pot Clavulanate Rash and Hives    Bacitracin Itching and Edema     Action Taken: osorio swelling, had to go to er;     Clindamycin      Other reaction(s): Rash       MEDICATIONS:  All current active medications have been reviewed.      PHYSICAL EXAM:  Temp:  [97.5 °F (36.4 °C)-101 °F (38.3 °C)] 97.5 °F (36.4 °C)  HR:  [] 73  Resp:  [18] 18  BP: ()/(50-77) 105/77  SpO2:  [51 %-96 %] 81 %  Temp (24hrs), Av.3 °F (36.8 °C), Min:97.5 °F (36.4 °C), Max:101 °F (38.3 °C)  Current: Temperature: 97.5 °F (36.4 °C)    Intake/Output Summary (Last 24 hours) at 2024 1808  Last data filed at 2024 1750  Gross per 24 hour   Intake 1660 ml   Output 1100 ml   Net 560 ml       General:  Well developed, no acute distress  HEENT:  Normocephalic, sclera anicteric, MMM  Heart:  RRR, no murmurs, rubs, or gallops  Lungs:  Clear to auscultation bilaterally  Abdomen:  Soft, nontender, nondistended, normal bowel sounds  :  No suprapubic tenderness  Extremities: Chronic venous insufficiency  Skin: Bilateral lower extremity chronic venous stasis changes, dry cracked skin.  Oozing area on the anterior left  leg.  Neuro:  Awake, alert, interactive     LABS, IMAGING, & OTHER STUDIES:  Lab Results:  I have personally reviewed pertinent labs.  Results from last 7 days   Lab Units 11/11/24  0552 11/10/24  1208   WBC Thousand/uL 14.30* 17.29*   HEMOGLOBIN g/dL 10.5* 12.2   PLATELETS Thousands/uL 178 251     Results from last 7 days   Lab Units 11/11/24  0552 11/10/24  1208   SODIUM mmol/L 133* 131*   POTASSIUM mmol/L 3.5 3.4*   CHLORIDE mmol/L 100 96   CO2 mmol/L 21 24   BUN mg/dL 23 26*   CREATININE mg/dL 1.39* 1.30   EGFR ml/min/1.73sq m 36 39   CALCIUM mg/dL 7.8* 8.8   AST U/L 25 19   ALT U/L 16 13   ALK PHOS U/L 68 75     Results from last 7 days   Lab Units 11/10/24  2101 11/10/24  1421 11/10/24  1415   BLOOD CULTURE   --  Received in Microbiology Lab. Culture in Progress.  --    GRAM STAIN RESULT   --   --  Gram positive cocci in pairs and chains*   URINE CULTURE  No Growth <1000 cfu/mL  --   --      Results from last 7 days   Lab Units 11/11/24  0552 11/10/24  1208   PROCALCITONIN ng/ml 41.52* 3.70*         Imaging Studies:   CT chest abdomen pelvis with contrast 11/10/2024  I have personally reviewed the imaging study reports and images in PACS.  No focal pulmonary consolidations, bilateral renal cysts, no bladder wall thickening.      Other Studies:   I have personally reviewed pertinent reports.      Pete Lion MD  [facility]

## 2024-11-11 NOTE — PROGRESS NOTES
Progress Note - Hospitalist   Name: Jenny Galarza 79 y.o. female I MRN: [id number]  Unit/Bed#: -01 I Date of Admission: 11/10/2024   Date of Service: 11/11/2024 I Hospital Day: 1    Assessment & Plan  Sepsis (HCC)  Meeting criteria due to tachycardia/leukocytosis with suspected UTI  Lactate 0.7, initial Pro-Renan 3.70, continue to trend  UA positive, follow-up urine culture  Follow-up blood culture  Flu RSV COVID-negative  CT chest abdomen pelvis with no acute infectious findings  Started empirically on IV ceftriaxone  2 L IV fluid bolus given in ED, continue with maintenance fluids.  Muscle weakness (generalized)  Coming in with generalized weakness/poor oral intake/lightheadedness/chills  Likely in the setting of UTI  Patient also noted to be hypotensive in the ER with systolic blood pressure readings in the 90s.  Patient receiving empiric IV fluids/IV antibiotics  Hold home antihypertensive agents.  PT OT eval requested  Hypothyroidism  Continue with Synthroid  Hyperlipidemia, mixed  Continue statin.  Diabetes mellitus, type 2 ([facility])  Lab Results   Component Value Date    HGBA1C 7.8 (H) 11/11/2024       Recent Labs     11/10/24  2051 11/11/24  0750 11/11/24  1103 11/11/24  1559   POCGLU 253* 134 212* 205*       Blood Sugar Average: Last 72 hrs:  (P) 193.8  Holding home Jardiance/Glyxambi  Patient placed on sliding scale coverage with ACHS checks  Consistent carb diet  Hypoglycemia protocol.  Afib ([facility])  History of paroxysmal A-fib  Holding patient's home Toprol-XL/diltiazem due to hypotension  Continue Eliquis.  Stage III chronic kidney disease ([facility])  Lab Results   Component Value Date    EGFR 36 11/11/2024    EGFR 39 11/10/2024    EGFR 48 (L) 07/10/2024    CREATININE 1.39 (H) 11/11/2024    CREATININE 1.30 11/10/2024    CREATININE 1.16 (H) 07/10/2024     Noted slightly elevation of creatinine from her baseline 1.1-1.2  Suspected in the setting of bacteremia.  Continue with IV fluids and reassess BMP in the  morning.  Avoid nephrotoxic agents and hypoperfusion.  Bacteremia due to Gram-positive bacteria  Patient went 1/2 blood culture positive for Streptococcus pyogenes, unclear source of infection.  Per infectious disease recommending to continue with IV ceftriaxone  Recommended Doppler ultrasound to rule out septic thrombophlebitis      VTE Pharmacologic Prophylaxis: VTE Score: 5 High Risk (Score >/= 5) - Pharmacological DVT Prophylaxis Ordered: enoxaparin (Lovenox). Sequential Compression Devices Ordered.    Mobility:   Basic Mobility Inpatient Raw Score: 20  JH-HLM Goal: 6: Walk 10 steps or more  JH-HLM Achieved: 7: Walk 25 feet or more  JH-HLM Goal achieved. Continue to encourage appropriate mobility.    Patient Centered Rounds: I performed bedside rounds with nursing staff today.   Discussions with Specialists or Other Care Team Provider: Discussed case with infectious disease    Education and Discussions with Family / Patient: Patient declined call to .     Current Length of Stay: 1 day(s)  Current Patient Status: Inpatient   Certification Statement: The patient will continue to require additional inpatient hospital stay due to IV antibiotic sent due to gram-positive bacteremia  Discharge Plan: Anticipate discharge in 24-48 hrs to home.    Code Status: Level 1 - Full Code    Subjective   Patient reports feeling better today.  Was having breakfast and tolerated well.  Denies any more chills, fever.  Per nurse the patient has increased pain in her hip and leg after the fall that she had at home.  Will provide with pain management.      Objective :  Temp:  [97.5 °F (36.4 °C)-101 °F (38.3 °C)] 97.5 °F (36.4 °C)  HR:  [] 73  BP: ()/() 105/77  Resp:  [18] 18  SpO2:  [51 %-98 %] 81 %  O2 Device: None (Room air)    Body mass index is 28.76 kg/m².     Input and Output Summary (last 24 hours):     Intake/Output Summary (Last 24 hours) at 11/11/2024 8101  Last data filed at 11/11/2024  1253  Gross per 24 hour   Intake 1420 ml   Output 1100 ml   Net 320 ml       Physical Exam  Vitals and nursing note reviewed.   Constitutional:       General: She is not in acute distress.     Appearance: She is well-developed.   HENT:      Head: Normocephalic and atraumatic.   Eyes:      Conjunctiva/sclera: Conjunctivae normal.   Cardiovascular:      Rate and Rhythm: Normal rate and regular rhythm.      Heart sounds: No murmur heard.  Pulmonary:      Effort: Pulmonary effort is normal. No respiratory distress.      Breath sounds: Normal breath sounds.   Abdominal:      Palpations: Abdomen is soft.      Tenderness: There is no abdominal tenderness.   Musculoskeletal:         General: No swelling.      Cervical back: Neck supple.      Right lower leg: No edema.      Left lower leg: No edema.   Skin:     General: Skin is warm and dry.      Capillary Refill: Capillary refill takes less than 2 seconds.      Comments: Scaly skin on her legs bilateral due to the psoriasis.   Neurological:      Mental Status: She is alert.   Psychiatric:         Mood and Affect: Mood normal.           Lines/Drains:              Lab Results: I have reviewed the following results:   Results from last 7 days   Lab Units 11/11/24  0552   WBC Thousand/uL 14.30*   HEMOGLOBIN g/dL 10.5*   HEMATOCRIT % 32.8*   PLATELETS Thousands/uL 178   SEGS PCT % 91*   LYMPHO PCT % 3*   MONO PCT % 5   EOS PCT % 0     Results from last 7 days   Lab Units 11/11/24  0552   SODIUM mmol/L 133*   POTASSIUM mmol/L 3.5   CHLORIDE mmol/L 100   CO2 mmol/L 21   BUN mg/dL 23   CREATININE mg/dL 1.39*   ANION GAP mmol/L 12   CALCIUM mg/dL 7.8*   ALBUMIN g/dL 3.4*   TOTAL BILIRUBIN mg/dL 1.14*   ALK PHOS U/L 68   ALT U/L 16   AST U/L 25   GLUCOSE RANDOM mg/dL 130         Results from last 7 days   Lab Units 11/11/24  1559 11/11/24  1103 11/11/24  0750 11/10/24  2051 11/10/24  1834   POC GLUCOSE mg/dl 205* 212* 134 253* 165*     Results from last 7 days   Lab Units  11/11/24  0552   HEMOGLOBIN A1C % 7.8*     Results from last 7 days   Lab Units 11/11/24  0552 11/10/24  1421 11/10/24  1208   LACTIC ACID mmol/L  --  0.7  --    PROCALCITONIN ng/ml 41.52*  --  3.70*       Recent Cultures (last 7 days):   Results from last 7 days   Lab Units 11/10/24  1421 11/10/24  1415   BLOOD CULTURE  Received in Microbiology Lab. Culture in Progress.  --    GRAM STAIN RESULT   --  Gram positive cocci in pairs and chains*       Imaging Results Review: I reviewed radiology reports from this admission including: CT chest and CT abdomen/pelvis.  Other Study Results Review: No additional pertinent studies reviewed.    Last 24 Hours Medication List:     Current Facility-Administered Medications:     acetaminophen (TYLENOL) tablet 650 mg, Q6H PRN    apixaban (ELIQUIS) tablet 5 mg, BID    [START ON 11/12/2024] cefTRIAXone (ROCEPHIN) 2,000 mg in dextrose 5 % 50 mL IVPB, Q24H    insulin lispro (HumALOG/ADMELOG) 100 units/mL subcutaneous injection 1-5 Units, TID AC **AND** Fingerstick Glucose (POCT), TID AC    levothyroxine tablet 125 mcg, Early Morning    multi-electrolyte (PLASMALYTE-A/ISOLYTE-S PH 7.4) IV solution, Continuous, Last Rate: 150 mL/hr (11/10/24 1836)    pravastatin (PRAVACHOL) tablet 40 mg, Daily With Dinner  \    **Please Note: This note may have been constructed using a voice recognition system.**

## 2024-11-11 NOTE — ASSESSMENT & PLAN NOTE
As evidenced by fever and leukocytosis.  Due to group A strep bacteremia.  Improving on appropriate antibiotics.  - Continue antibiotics and further management as below  - Trend fever, WBC count, vitals  - Additional supportive care per primary team

## 2024-11-11 NOTE — ASSESSMENT & PLAN NOTE
"Subjective:      Patient ID: Wallace Hewitt Lab Results   Component Value Date    EGFR 36 11/11/2024    EGFR 39 11/10/2024    EGFR 48 (L) 07/10/2024    CREATININE 1.39 (H) 11/11/2024    CREATININE 1.30 11/10/2024    CREATININE 1.16 (H) 07/10/2024     Noted slightly elevation of creatinine from her baseline 1.1-1.2  Suspected in the setting of bacteremia.  Continue with IV fluids and reassess BMP in the morning.  Avoid nephrotoxic agents and hypoperfusion.   

## 2024-11-11 NOTE — ASSESSMENT & PLAN NOTE
Coming in with generalized weakness/poor oral intake/lightheadedness/chills  Likely in the setting of UTI  Patient also noted to be hypotensive in the ER with systolic blood pressure readings in the 90s.  Patient receiving empiric IV fluids/IV antibiotics  Hold home antihypertensive agents.  PT DANNI negrete requested

## 2024-11-11 NOTE — ASSESSMENT & PLAN NOTE
Blood cultures on 11/10 isolated group A Streptococcus.  Likely source is lower extremity stasis dermatitis.  - Continue ceftriaxone 2 g daily  - Follow BMP and CBCD to monitor for medication toxicities and treatment response  - Anticipate total 14-day antibiotic course, can likely finish with oral antibiotics after further clinical improvement  - No need to repeat blood cultures or check echo, as this organism carries a very low risk of endovascular infection and patient otherwise improving as expected

## 2024-11-11 NOTE — SPEECH THERAPY NOTE
Speech-Language Pathology Bedside Swallow Evaluation        Patient Name: Jenny Galarza    Today's Date: 11/11/2024     Problem List  Principal Problem:    Sepsis (HCC)  Active Problems:    Hypothyroidism    Hyperlipidemia, mixed    Diabetes mellitus, type 2 (HCC)    Afib (HCC)    Muscle weakness (generalized)         Past Medical History  Past Medical History:   Diagnosis Date    A-fib (HCC)     Anemia     Chronic kidney disease     Diabetes mellitus (HCC)     Hypertension        Past Surgical History  Past Surgical History:   Procedure Laterality Date    CATARACT EXTRACTION, BILATERAL      CHOLECYSTECTOMY      GALLBLADDER SURGERY      HERNIA REPAIR      REPLACEMENT TOTAL KNEE      TONSILLECTOMY         Summary/Impressions:   Bedside observations support grossly intact oropharyngeal swallow function across all consistencies tested.  Self-fed solid and liquid trials with no s/s of dysphagia or distress.  Patient denies difficulties or changes from baseline function.  Attributes poor appetite to feeling ill, reports to feel much better and intake is good.  Patient noted to consume majority of breakfast with no difficulties.      Recommendations:   Diet: regular diet and thin liquids   Meds: whole with liquid   Aspiration precautions and compensatory swallowing strategies:  NA  Other Recommendations/ considerations: No further ST follow-up        Current Medical Status  Pt is a 79 y.o. female who presented to [facility] with  PMH of A-fib on Eliquis/hypertension/hyperlipidemia/diabetes/hypothyroid who presents with chills/weakness/lightheadedness.  Reports symptoms have been ongoing for 2 days now.  She felt extremely lightheaded and wobbly on her feet.  Also reporting symptoms of chills.  No fevers.  Overall poor appetite and feeling weak hence presented to ER.    Past medical history:  Please see H&P for details    Special Studies:  CT chest 11/10/24:  1.  No acute findings in the abdomen or pelvis.  2.   Irregular opacity in the right upper lobe and a few additional pulmonary nodules with the largest being 7 mm in the left lower lobe. Recommend 3-month follow-up chest CT.  3.  Indeterminate 1.2 cm exophytic right renal lesion. Cannot distinguish hyperdense cyst from solid neoplasm.  4.  Focal intrahepatic biliary ductal dilation in the right hepatic lobe. Possibly related to prior cholecystectomy but cannot exclude an underlying lesion.  5.  Multiple pancreatic cysts have mildly enlarged from 2019, largest measuring 2.6 cm in the pancreatic head.  6.  Recommend outpatient contrast-enhanced abdominal MRI and MRCP for further evaluation of these solid organ findings.  7.  Bilateral inguinal lymphadenopathy is nonspecific. This was present to at least some extent in 2019 but at least one right inguinal node has since enlarged. Clinical follow-up advised and correlation for any lower extremity or pelvic inflammation. A   6-month ultrasound could be considered.    Social/Education/Vocational Hx:  Pt lives with family    Swallow Information   Current Risks for Dysphagia & Aspiration:  Poor PO intake  Current Symptoms/Concerns: change in respiratory status and poor PO intake   Current Diet: regular diet and thin liquids   Baseline Diet: regular diet and thin liquids    Baseline Assessment   Behavior/Cognition: alert  Speech/Language Status: able to participate in conversation  Patient Positioning: upright in bed    Swallow Mechanism Exam   Facial: symmetrical  Labial: WFL  Lingual: WFL  Velum: symmetrical  Mandible: adequate ROM  Dentition: adequate  Vocal quality:clear/adequate   Volitional Cough: strong/productive   Respiratory: RA    Consistencies Assessed and Performance   Consistencies Administered: thin liquids, puree, and hard solids    Oral Stage: WFL. Patient presents with adequate bolus acceptance, containment and manipulation.  Mastication judged to be efficient and complete.  No oral residue.     Pharyngeal  Stage: Appears functional.  Laryngeal rise noted upon palpation.  Swallow initiation appears timely.  No overt s/s of aspiration or distress.  Vocal quality remains clear and dry.     Esophageal Concerns: Pt w/o complaints; hx GERD reported      Plan  No additional follow-up at this time. Please re-order should pt exhibit change in status or concerns arise.     Results Reviewed with: patient and MD Jenny Gomez, MS, CCC-SLP  Speech-Language Pathologist  [place] #[id number]  [place] #[id number]

## 2024-11-11 NOTE — ASSESSMENT & PLAN NOTE
Likely source of bacteremia.  No overt signs of cellulitis, but she does have tenderness and indurated skin, as well as multiple areas of dry cracked skin.  There is weeping superficial wound on the left leg.  Long history of venous insufficiency.  - Antibiotics as above  - Needs risk factor reduction: [place] use of emollients to dry cracked skin, aggressive leg elevation, compression or Ace wrap as tolerated

## 2024-11-11 NOTE — PLAN OF CARE
Problem: PAIN - ADULT  Goal: Verbalizes/displays adequate comfort level or baseline comfort level  Description: Interventions:  - Encourage patient to monitor pain and request assistance  - Assess pain using appropriate pain scale  - Administer analgesics based on type and severity of pain and evaluate response  - Implement non-pharmacological measures as appropriate and evaluate response  - Consider cultural and social influences on pain and pain management  - Notify physician/advanced practitioner if interventions unsuccessful or patient reports new pain  Outcome: Progressing     Problem: INFECTION - ADULT  Goal: Absence or prevention of progression during hospitalization  Description: INTERVENTIONS:  - Assess and monitor for signs and symptoms of infection  - Monitor lab/diagnostic results  - Monitor all insertion sites, i.e. indwelling lines, tubes, and drains  - Monitor endotracheal if appropriate and nasal secretions for changes in amount and color  - [place] appropriate cooling/warming therapies per order  - Administer medications as ordered  - Instruct and encourage patient and family to use good hand hygiene technique  - Identify and instruct in appropriate isolation precautions for identified infection/condition  Outcome: Progressing  Goal: Absence of fever/infection during neutropenic period  Description: INTERVENTIONS:  - Monitor WBC    Outcome: Progressing     Problem: SAFETY ADULT  Goal: Patient will remain free of falls  Description: INTERVENTIONS:  - Educate patient/family on patient safety including physical limitations  - Instruct patient to call for assistance with activity   - Consult OT/PT to assist with strengthening/mobility   - Keep Call bell within reach  - Keep bed low and locked with side rails adjusted as appropriate  - Keep care items and personal belongings within reach  - Initiate and maintain comfort rounds  - Make Fall Risk Sign visible to staff  - Apply yellow socks and bracelet  for high fall risk patients  - Consider moving patient to room near nurses station  Outcome: Progressing  Goal: Maintain or return to baseline ADL function  Description: INTERVENTIONS:  -  Assess patient's ability to carry out ADLs; assess patient's baseline for ADL function and identify physical deficits which impact ability to perform ADLs (bathing, care of mouth/teeth, toileting, grooming, dressing, etc.)  - Assess/evaluate cause of self-care deficits   - Assess range of motion  - Assess patient's mobility; develop plan if impaired  - Assess patient's need for assistive devices and provide as appropriate  - Encourage maximum independence but intervene and supervise when necessary  - Involve family in performance of ADLs  - Assess for home care needs following discharge   - Consider OT consult to assist with ADL evaluation and planning for discharge  - Provide patient education as appropriate  Outcome: Progressing  Goal: Maintains/Returns to pre admission functional level  Description: INTERVENTIONS:  - Perform AM-PAC 6 Click Basic Mobility/ Daily Activity assessment daily.  - Set and communicate daily mobility goal to care team and patient/family/caregiver.   - Collaborate with rehabilitation services on mobility goals if consulted  - Out of bed for toileting  - Record patient progress and toleration of activity level   Outcome: Progressing     Problem: DISCHARGE PLANNING  Goal: Discharge to home or other facility with appropriate resources  Description: INTERVENTIONS:  - Identify barriers to discharge w/patient and caregiver  - Arrange for needed discharge resources and transportation as appropriate  - Identify discharge learning needs (meds, wound care, etc.)  - Arrange for interpretive services to assist at discharge as needed  - Refer to Case Management Department for coordinating discharge planning if the patient needs post-hospital services based on physician/advanced practitioner order or complex needs  related to functional status, cognitive ability, or social support system  Outcome: Progressing     Problem: Knowledge Deficit  Goal: Patient/family/caregiver demonstrates understanding of disease process, treatment plan, medications, and discharge instructions  Description: Complete learning assessment and assess knowledge base.  Interventions:  - Provide teaching at level of understanding  - Provide teaching via preferred learning methods  Outcome: Progressing

## 2024-11-11 NOTE — UTILIZATION REVIEW
Initial Clinical Review    Admission: Date/Time/Statement:   Admission Orders (From admission, onward)       Ordered        11/10/24 1730  Inpatient Admission  Once                          Orders Placed This Encounter   Procedures    Inpatient Admission     Standing Status:   Standing     Number of Occurrences:   1     Level of Care:   Med Surg [16]     Estimated length of stay:   More than 2 Midnights     Certification:   I certify that inpatient services are medically necessary for this patient for a duration of greater than two midnights. See H&P and MD Progress Notes for additional information about the patient's course of treatment.     ED Arrival Information       Expected   -    Arrival   11/10/2024 11:29    Acuity   Urgent              Means of arrival   Ambulance    Escorted by   [employer])    Service   Hospitalist    Admission type   Emergency              Arrival complaint   weakness             Chief Complaint   Patient presents with    Weakness - Generalized     Pt brought in via EMS with C/O feeling weak, Pt stated went to sleep last night feeling like she has the chills woke up feeling weak and fell getting out of bed today landed on her but.- LOC -BT. Pt stated said she feels like she does after getting flu vaccine last year.        Initial Presentation: 79 y.o. female  to ED from home w/ PMH of A-fib on eliquis/hypertension/hyperlipidemia/diabetes/hypothyroid who presents with chills/weakness/lightheadedness. Sx x2 days . Lightheaded and wobbly on feet . + chills , + nausea . Wbc 17.29 . Lactate 0.7 , pct 3.70 , trend . Ua + , f/u ua cx .  Given 2l IVF in ED  along w/ ceftriaxone . Admitted IP status w/ sepsis , muscle weakness . Plan to cont ceftriaxone , IVF , PT OT eval , hold home antihypertensives .  DM hold jardiance /glyxambi . Afib hold toprol , cardizem , eliquis.     Anticipated Length of Stay/Certification Statement: Patient will be admitted on an inpatient basis with an anticipated  length of stay of greater than 2 midnights secondary to ivf/iv abx.     Date: 11/11   Day 2: CT chest abdomen pelvis with no acute infectious findings.   Started empirically on IV ceftriaxone.   2 L IV fluid bolus given in ED, continue with maintenance fluids. Cont IV abx d/t gram + bacteremia .  Wbc 14.30. pct 41.52  inc from 3.70.     11/11 ID Consult   Cont abx , trend fever , wbc , supportive care . Streptococcal bacteremia , cont ceftriaxone . DM cr 1.39 inc 1.30    Date: 11/12  Day 3: Has surpassed a 2nd midnight with active treatments and services.  + 1 pitting pedal edema . CR improved to 1.20 from 1.39.  abx switched to IV cefazolin from ceftriaxone . Cont IVF and reassess BMP in am . Per infectious disease recommending to continue with IV ceftriaxone  Recommended Doppler ultrasound to rule out septic thrombophlebitis           ED Treatment-Medication Administration from 11/10/2024 1129 to 11/10/2024 1752         Date/Time Order Dose Route Action     11/10/2024 1216 sodium chloride 0.9 % bolus 1,000 mL 1,000 mL Intravenous New Bag     11/10/2024 1213 ondansetron (ZOFRAN) injection 4 mg 4 mg Intravenous Given     11/10/2024 1216 acetaminophen (Ofirmev) injection 1,000 mg 1,000 mg Intravenous New Bag     11/10/2024 1414 sodium chloride 0.9 % bolus 1,000 mL 1,000 mL Intravenous New Bag     11/10/2024 1357 iohexol (OMNIPAQUE) 350 MG/ML injection (MULTI-DOSE) 100 mL 100 mL Intravenous Given     11/10/2024 1643 ceftriaxone (ROCEPHIN) 1 g/50 mL in dextrose IVPB 1,000 mg Intravenous New Bag            Scheduled Medications:  apixaban, 5 mg, Oral, BID  cefTRIAXone, 1,000 mg, Intravenous, Q24H  insulin lispro, 1-5 Units, Subcutaneous, TID AC  levothyroxine, 125 mcg, Oral, Early Morning  pravastatin, 40 mg, Oral, Daily With Dinner      Continuous IV Infusions:  multi-electrolyte, 150 mL/hr, Intravenous, Continuous      PRN Meds:  acetaminophen, 650 mg, Oral, Q6H PRN  diphenhydrAMINE-zinc acetate, , Topical, TID  PRN  oxyCODONE, 5 mg, Oral, Q6H PRN      ED Triage Vitals   Temperature Pulse Respirations Blood Pressure SpO2 Pain Score   11/10/24 1139 11/10/24 1139 11/10/24 1139 11/10/24 1139 11/10/24 1139 11/10/24 1828   99 °F (37.2 °C) 76 16 91/55 97 % No Pain     Weight (last 2 days)       None            Vital Signs (last 3 days)       Date/Time Temp Pulse Resp BP MAP (mmHg) SpO2 O2 Device Patient Position - Orthostatic VS [place] Coma Scale Score Pain    11/12/24 23:24:14 -- -- -- 119/77 91 -- -- -- -- --    11/12/24 21:58:13 99.1 °F (37.3 °C) 121 15 108/68 81 90 % -- -- -- --    11/12/24 20:49:36 100.4 °F (38 °C) -- -- -- -- -- -- -- -- --    11/12/24 1945 -- -- -- -- -- -- None (Room air) -- 15 Med Not Given for Pain - for MAR use only    11/12/24 19:29:23 101 °F (38.3 °C) 139 -- 127/72 90 96 % -- -- -- --    11/12/24 19:27:17 101 °F (38.3 °C) 121 16 128/70 89 98 % -- -- -- --    11/12/24 1740 -- -- -- 143/84 -- -- -- -- -- --    11/12/24 15:02:46 98.8 °F (37.1 °C) -- -- 142/82 102 -- -- -- -- --    11/12/24 15:02:10 98.8 °F (37.1 °C) 121 -- 142/82 102 80 % -- -- -- --    11/12/24 15:01:58 98.8 °F (37.1 °C) 118 -- 142/82 102 89 % -- -- -- --    11/12/24 12:08:53 -- 116 -- 151/95 114 98 % -- -- -- --    11/12/24 1203 -- -- -- 151/95 -- -- -- -- -- --    11/12/24 1130 -- -- -- -- -- -- -- -- -- No Pain    11/12/24 07:25:59 99.4 °F (37.4 °C) 142 -- 122/80 94 95 % -- -- -- --    11/12/24 05:15:03 -- 139 -- -- -- 95 % -- -- -- --    11/11/24 21:38:18 99 °F (37.2 °C) 119 18 111/76 88 96 % None (Room air) -- 15 No Pain    11/11/24 1709 -- -- -- -- -- -- -- -- -- 8    11/11/24 14:55:24 97.5 °F (36.4 °C) 73 -- 105/77 86 81 % -- -- -- --    11/11/24 14:54:39 97.5 °F (36.4 °C) 61 -- 105/77 86 51 % -- -- -- --    11/11/24 14:54:09 97.5 °F (36.4 °C) -- -- 105/77 86 -- -- -- -- --    11/11/24 1002 -- -- -- -- -- -- None (Room air) -- 15 --    11/11/24 0941 -- -- -- -- -- -- -- -- -- 8    11/11/24 0940 -- -- -- -- -- -- -- -- -- 8     11/11/24 07:52:11 98.1 °F (36.7 °C) 106 -- 122/72 89 95 % -- -- -- --    11/11/24 01:35:26 -- 86 -- 97/57 70 96 % -- -- -- --    11/10/24 2352 -- 75 18 92/52 68 91 % -- -- -- --    11/10/24 2146 -- -- -- -- -- -- -- -- -- Med Not Given for Pain - for MAR use only    11/10/24 2135 101 °F (38.3 °C) 69 -- 98/50 -- 92 % -- -- -- --    11/10/24 19:56:48 -- 88 -- 119/63 82 94 % -- -- -- --    11/10/24 1930 -- -- -- -- -- -- None (Room air) -- 15 No Pain    11/10/24 1828 -- -- -- -- -- -- -- -- -- No Pain    11/10/24 17:57:16 97.9 °F (36.6 °C) 85 18 147/115 126 94 % None (Room air) Lying -- --    11/10/24 1645 -- 86 18 94/51 67 98 % None (Room air) Lying -- --    11/10/24 1514 -- 92 18 92/62 -- -- -- -- -- --    11/10/24 1415 -- 83 16 99/49 70 97 % -- -- -- --    11/10/24 1157 -- -- -- -- -- -- -- -- 15 --    11/10/24 1139 99 °F (37.2 °C) 76 16 91/55 -- 97 % -- Lying -- --              Pertinent Labs/Diagnostic Test Results:   Radiology:   VAS VENOUS DUPLEX - LOWER LIMB BILATERAL   Final Interpretation by Christiano Monzon MD (11/13 0019)   RIGHT LOWER LIMB:  No evidence of acute or chronic deep vein thrombosis.  No evidence of superficial thrombophlebitis noted.  Doppler evaluation shows a normal response to augmentation maneuvers within the  deep system.  Superficial venous reflux is noted in the sapheno-femoral junction.  Popliteal, posterior tibial and anterior tibial arterial Doppler waveforms are  triphasic.     LEFT LOWER LIMB:  No evidence of acute or chronic deep vein thrombosis.  No evidence of superficial thrombophlebitis noted.  Doppler evaluation shows a normal response to augmentation maneuvers within the  deep system.  Superficial venous reflux is noted in the sapheno-femoral junction.  Popliteal, posterior tibial and anterior tibial arterial Doppler waveforms are  triphasic.   CT chest abdomen pelvis w contrast   Final Interpretation by Julio Lawrence MD (11/10 6857)      1.  No acute findings in the  abdomen or pelvis.   2.  Irregular opacity in the right upper lobe and a few additional pulmonary nodules with the largest being 7 mm in the left lower lobe. Recommend 3-month follow-up chest CT.   3.  Indeterminate 1.2 cm exophytic right renal lesion. Cannot distinguish hyperdense cyst from solid neoplasm.   4.  Focal intrahepatic biliary ductal dilation in the right hepatic lobe. Possibly related to prior cholecystectomy but cannot exclude an underlying lesion.   5.  Multiple pancreatic cysts have mildly enlarged from 2019, largest measuring 2.6 cm in the pancreatic head.   6.  Recommend outpatient contrast-enhanced abdominal MRI and MRCP for further evaluation of these solid organ findings.   7.  Bilateral inguinal lymphadenopathy is nonspecific. This was present to at least some extent in 2019 but at least one right inguinal node has since enlarged. Clinical follow-up advised and correlation for any lower extremity or pelvic inflammation. A    6-month ultrasound could be considered.         The study was marked in EPIC for immediate notification.      Workstation performed: [id number]         XR chest 1 view portable   Final Interpretation by Tex Echevarria MD (11/11 0702)      No acute cardiopulmonary disease.            Workstation performed: [id number]           Cardiology:  ECG 12 lead   Final Result by Bryon Adams MD (11/12 1116)   Atrial fibrillation with rapid ventricular response with premature    ventricular or aberrantly conducted complexes   Low voltage QRS   Possible Anterolateral infarct (cited on or before 13-Nov-2019)   Confirmed by Bryon Adams ([id number]) on 11/12/2024 11:16:02 AM        GI:  No orders to display       Results from last 7 days   Lab Units 11/10/24  1208   SARS-COV-2  Negative     Results from last 7 days   Lab Units 11/12/24  0510 11/11/24  0552 11/10/24  1208   WBC Thousand/uL 11.66* 14.30* 17.29*   HEMOGLOBIN g/dL 9.9* 10.5* 12.2   HEMATOCRIT % 30.5* 32.8* 38.6   PLATELETS  Thousands/uL 178 178 251   TOTAL NEUT ABS Thousands/µL 10.29* 13.00* 15.71*         Results from last 7 days   Lab Units 11/12/24  0510 11/11/24  0552 11/10/24  1208   SODIUM mmol/L 131* 133* 131*   POTASSIUM mmol/L 3.1* 3.5 3.4*   CHLORIDE mmol/L 100 100 96   CO2 mmol/L 22 21 24   ANION GAP mmol/L 9 12 11   BUN mg/dL 26* 23 26*   CREATININE mg/dL 1.20 1.39* 1.30   EGFR ml/min/1.73sq m 43 36 39   CALCIUM mg/dL 7.4* 7.8* 8.8   MAGNESIUM mg/dL 1.9  --   --      Results from last 7 days   Lab Units 11/11/24  0552 11/10/24  1208   AST U/L 25 19   ALT U/L 16 13   ALK PHOS U/L 68 75   TOTAL PROTEIN g/dL 6.2* 6.9   ALBUMIN g/dL 3.4* 3.7   TOTAL BILIRUBIN mg/dL 1.14* 1.96*     Results from last 7 days   Lab Units 11/12/24  2106 11/12/24  1547 11/12/24  1104 11/12/24  0723 11/11/24  2137 11/11/24  1559 11/11/24  1103 11/11/24  0750 11/10/24  2051 11/10/24  1834   POC GLUCOSE mg/dl 202* 240* 199* 160* 204* 205* 212* 134 253* 165*     Results from last 7 days   Lab Units 11/13/24  0431 11/12/24  0510 11/11/24  0552 11/10/24  1208   GLUCOSE RANDOM mg/dL 134 159* 130 206*         Results from last 7 days   Lab Units 11/11/24  0552   HEMOGLOBIN A1C % 7.8*   EAG mg/dl 177     Results from last 7 days   Lab Units 11/10/24  1648 11/10/24  1531 11/10/24  1208   HS TNI 0HR ng/L  --   --  10   HS TNI 2HR ng/L  --  11  --    HSTNI D2 ng/L  --  1  --    HS TNI 4HR ng/L 8  --   --    HSTNI D4 ng/L -2  --   --      Results from last 7 days   Lab Units 11/11/24  0552 11/10/24  1208   PROCALCITONIN ng/ml 41.52* 3.70*     Results from last 7 days   Lab Units 11/10/24  1421   LACTIC ACID mmol/L 0.7         Results from last 7 days   Lab Units 11/10/24  1208   LIPASE u/L 25     Results from last 7 days   Lab Units 11/12/24  1047   OSMO UR mmol/*     Results from last 7 days   Lab Units 11/12/24  1047 11/10/24  1625   CLARITY UA   --  Clear   COLOR UA   --  Light Yellow   SPEC GRAV UA   --  1.029   PH UA   --  5.5   GLUCOSE UA mg/dl  --   >=1000 (1%)*   KETONES UA mg/dl  --  Trace*   BLOOD UA   --  Negative   PROTEIN UA mg/dl  --  Negative   NITRITE UA   --  Negative   BILIRUBIN UA   --  Negative   UROBILINOGEN UA (BE) mg/dl  --  <2.0   LEUKOCYTES UA   --  Elevated glucose may cause decreased leukocyte values. See urine microscopic for UWBC result*   WBC UA /hpf  --  2-4*   RBC UA /hpf  --  None Seen   BACTERIA UA /hpf  --  None Seen   EPITHELIAL CELLS WET PREP /hpf  --  Occasional   SODIUM UR  12  --      Results from last 7 days   Lab Units 11/10/24  1208   INFLUENZA A PCR  Negative   INFLUENZA B PCR  Negative   RSV PCR  Negative           Results from last 7 days   Lab Units 11/10/24  2101 11/10/24  1421 11/10/24  1415   BLOOD CULTURE   --  No Growth at 48 hrs. Streptococcus pyogenes (Group A)*   GRAM STAIN RESULT   --   --  Gram positive cocci in pairs and chains*   URINE CULTURE  No Growth <1000 cfu/mL  --   --        Past Medical History:   Diagnosis Date    A-fib (HCC)     Anemia     Chronic kidney disease     Diabetes mellitus (HCC)     Hypertension      Present on Admission:   Hypothyroidism   Hyperlipidemia, mixed   Diabetes mellitus, type 2 (HCC)   Paroxysmal atrial fibrillation (HCC) in RVR   Stage III chronic kidney disease (HCC)      Admitting Diagnosis: Weakness [R53.1]  Hypotension [I95.9]  Abnormal CT scan [R93.89]  Fall, initial encounter [W19.XXXA]  Age/Sex: 79 y.o. female    Network Utilization Review Department  ATTENTION: Please call with any questions or concerns to [phone] and carefully listen to the prompts so that you are directed to the right person. All voicemails are confidential.   For Discharge needs, contact Care Management DC Support Team at [phone] opt. [phone]  Send all requests for admission clinical reviews, approved or denied determinations and any other requests to dedicated fax number below belonging to the campus where the patient is receiving treatment. List of dedicated fax numbers for the  Facilities:  FACILITY NAME UR FAX NUMBER   ADMISSION DENIALS (Administrative/Medical Necessity) [phone]   DISCHARGE SUPPORT TEAM (NETWORK) [phone]   PARENT CHILD HEALTH (Maternity/NICU/Pediatrics) [phone]   [facility] [phone]   [facility] [phone]   [facility] [phone]   [facility] [phone]   [facility] [phone]   General acute hospital [phone]   [facility] [phone]   [facility] [phone]   [facility] [phone]   [facility] [phone]   [facility] [phone]   [facility] [phone]

## 2024-11-11 NOTE — NURSING NOTE
Pt with soft bp last night 92/52. Pt refuses feelings of lightheadedness or dizziness. Contacted on call ZACKARY Toro with concerns for soft bp. On call SLIM ordered albumin. Albumin administered. Pt's bp increased to 97/57. Per on call ZACKARY, no further orders at this time.

## 2024-11-11 NOTE — PHYSICAL THERAPY NOTE
Physical Therapy Evaluation     Patient's Name: Jenny Galarza    Admitting Diagnosis  Weakness [R53.1]  Hypotension [I95.9]  Abnormal CT scan [R93.89]  Fall, initial encounter [W19.XXXA]    Problem List  Patient Active Problem List   Diagnosis    Stage III chronic kidney disease (HCC)    Hypothyroidism    Hypertension    Hyperlipidemia, mixed    Hyperkalemia    Gout    Diabetes mellitus, type 2 (HCC)    Anemia    Afib (HCC)    Benign essential hypertension    Chronic kidney disease-mineral and bone disorder    Intractable nausea and vomiting    Sepsis (HCC)    Muscle weakness (generalized)       Past Medical History  Past Medical History:   Diagnosis Date    A-fib (HCC)     Anemia     Chronic kidney disease     Diabetes mellitus (HCC)     Hypertension        Past Surgical History  Past Surgical History:   Procedure Laterality Date    CATARACT EXTRACTION, BILATERAL      CHOLECYSTECTOMY      GALLBLADDER SURGERY      HERNIA REPAIR      REPLACEMENT TOTAL KNEE      TONSILLECTOMY            11/11/24 0941   PT Last Visit   PT Visit Date 11/11/24   Note Type   Note type Evaluation   Pain Assessment   Pain Assessment Tool 0-10   Pain Score 8   Pain Location/Orientation Orientation: Right;Location: Leg  (x1 week, worsens with mobility)   Hospital Pain Intervention(s)   (RN made aware of pt's pain report)   Restrictions/Precautions   Weight Bearing Precautions Per Order No   Other Precautions Fall Risk;Pain   Home Living   Type of Home House   Home Layout Two level;Bed/bath upstairs;Stairs to enter with rails;1/2 bath on main level  (5 GENET)   Bathroom Shower/Tub Walk-in shower   Bathroom Toilet Standard   Bathroom Equipment   (none per pt)   Bathroom Accessibility Accessible   Home Equipment   (no DME at baseline)   Prior Function   Level of [place] Independent with ADLs;Independent with functional mobility;Independent with IADLS   Lives With Other (Comment)  (sister and niece)   Receives Help From Family  (3 sons, 2  grandchildren)   IADLs Independent with driving;Independent with meal prep;Independent with medication management  (cleaning and laundry with Ind)   Falls in the last 6 months 1 to 4  (2 - this one and 1 outside on deck)   Vocational Retired  ([employer])   General   Family/Caregiver Present No   Cognition   Overall Cognitive Status WFL   Arousal/Participation Alert   Attention Within functional limits   Orientation Level Oriented X4   Memory Within functional limits   Following Commands Follows all commands and directions without difficulty   Comments pt agreeable to PT evaluation   RLE Assessment   RLE Assessment   (Grossly 4/5 observed with functional mobility)   LLE Assessment   LLE Assessment   (Grossly 4/5 observed with functional mobility)   Vision-Basic Assessment   Current Vision No visual deficits   Coordination   Movements are Fluid and Coordinated 1   Sensation WFL   Bed Mobility   Additional Comments pt received OOB upon arrival   Transfers   Sit to Stand 5  Supervision   Additional items Assist x 1;Increased time required   Stand to Sit 5  Supervision   Additional items Assist x 1;Increased time required   Ambulation/Elevation   Gait pattern Decreased foot clearance;Decreased R stance;Foward flexed;Antalgic;Short stride   Gait Assistance 5  Supervision   Additional items Assist x 1;Verbal cues   Assistive Device Rolling walker;None   Distance 50' x 2   Ambulation/Elevation Additional Comments pt ambulated 50' x2, 1st trial with use of RW, 2nd trial without AD   Balance   Static Sitting Good   Dynamic Sitting Fair +   Static Standing Fair +   Dynamic Standing Fair   Ambulatory Fair   Endurance Deficit   Endurance Deficit Yes   Activity Tolerance   Activity Tolerance Patient limited by pain   Medical Staff Made Aware Pt seen as a co-eval with OT due to the patient's co-morbidities, clinically unstable presentation, and present impairments which are a regression from the patient's baseline.   Nurse Made  Aware RN Sara   Assessment   Prognosis Good   Problem List Decreased strength;Decreased endurance;Impaired balance;Decreased mobility;Pain   Assessment Pt is 79 y.o. female seen for PT evaluation on 11/11/2024 s/p admit to [facility] on 11/10/2024 w/ Sepsis (HCC). PT was consulted to assess pt's functional mobility and d/c needs. Order placed for PT eval and tx. PTA, pt lives with her sister and niece in [place] with 5 GENET, ambulates without AD. At time of eval, pt performing transfers and level surface gait trial SBA. Upon evaluation, pt presenting with impaired functional mobility d/t decreased strength, decreased endurance, impaired balance, decreased mobility, and pain. Pertinent PMHx and current co-morbidities affecting pt's physical performance at time of assessment include: sepsis, hypothyroidism, HLD, type 2 DM, a fib, muscle weakness, stage III CKD, gout, anemia, CKD-mineral and bone disorder. Personal factors affecting pt at time of eval include: lives in 2 story house, ambulating w/ assistive device, stairs to enter home, and positive fall history. The following objective measures performed on IE also reveal limitations: Barthel Index: 80/100, Modified [place]: 2 (slight disability), and AM-PAC 6-Clicks: 20/24. Pt's clinical presentation is currently evolving seen in pt's presentation of abnormal lab value(s) and ongoing medical assessment. Overall, pt's rehab potential and prognosis to return to PLOF is good as impacted by objective findings, warranting pt to receive further skilled PT interventions to address identified impairments, activity limitation(s), and participation restriction(s). Pt to benefit from continued PT tx to address deficits as defined above and maximize level of functional independent mobility. From PT/mobility standpoint, recommend level 3, minimal resource intensity in order to facilitate return to PLOF.   Barriers to Discharge None   Goals   Patient Goals to go home   STG  Expiration Date 11/21/24   Short Term Goal #1 In 7-10 days: Increase bilateral LE strength 1/2 grade to facilitate independent mobility, Ambulate > 250 ft. with least restrictive assistive device modified independent w/o LOB and w/ normalized gait pattern 100% of the time, Navigate 13 stair(s) modified independent with unilateral handrail to facilitate return to previous living environment, Increase all balance 1/2 grade to decrease risk for falls, Improve Barthel Index score to 90 or greater to facilitate independence, and PT provider will perform functional balance assessment to determine fall risk   PT Treatment Day 0   Plan   Treatment/Interventions LE strengthening/ROM;Elevations;Therapeutic exercise;Endurance training;Equipment eval/education;Gait training;Spoke to nursing;OT   PT Frequency 2-3x/wk   Discharge Recommendation   Rehab Resource Intensity Level, PT III (Minimum Resource Intensity)   Equipment Recommended Walker  (RW)   AM-PAC Basic Mobility Inpatient   Turning in Flat Bed Without Bedrails 4   Lying on Back to Sitting on Edge of Flat Bed Without Bedrails 4   Moving Bed to Chair 3   Standing Up From Chair Using Arms 3   Walk in Room 3   Climb 3-5 Stairs With Railing 3   Basic Mobility Inpatient Raw Score 20   Basic Mobility Standardized Score 43.99   [facility] Highest Level Of Mobility   -HL Goal 6: Walk 10 steps or more   -HLM Achieved 7: Walk 25 feet or more   Modified Dickens Scale   Modified [place] Scale 2   Barthel Index   Feeding 10   Bathing 5   Grooming Score 5   Dressing Score 10   Bladder Score 10   Bowels Score 10   Toilet Use Score 10   Transfers (Bed/Chair) Score 10   Mobility (Level Surface) Score 10   Stairs Score 0   Barthel Index Score 80         Pau Thorpe, PT

## 2024-11-12 PROBLEM — R21 RASH: Status: ACTIVE | Noted: 2024-11-12

## 2024-11-12 PROBLEM — E87.1 HYPO-OSMOLALITY AND HYPONATREMIA: Status: ACTIVE | Noted: 2024-11-12

## 2024-11-12 LAB
ANION GAP SERPL CALCULATED.3IONS-SCNC: 9 MMOL/L (ref 4–13)
BASOPHILS # BLD AUTO: 0.03 THOUSANDS/ÂΜL (ref 0–0.1)
BASOPHILS NFR BLD AUTO: 0 % (ref 0–1)
BUN SERPL-MCNC: 26 MG/DL (ref 5–25)
CALCIUM SERPL-MCNC: 7.4 MG/DL (ref 8.4–10.2)
CHLORIDE SERPL-SCNC: 100 MMOL/L (ref 96–108)
CO2 SERPL-SCNC: 22 MMOL/L (ref 21–32)
CREAT SERPL-MCNC: 1.2 MG/DL (ref 0.6–1.3)
EOSINOPHIL # BLD AUTO: 0 THOUSAND/ÂΜL (ref 0–0.61)
EOSINOPHIL NFR BLD AUTO: 0 % (ref 0–6)
ERYTHROCYTE [DISTWIDTH] IN BLOOD BY AUTOMATED COUNT: 15.1 % (ref 11.6–15.1)
GFR SERPL CREATININE-BSD FRML MDRD: 43 ML/MIN/1.73SQ M
GLUCOSE SERPL-MCNC: 159 MG/DL (ref 65–140)
GLUCOSE SERPL-MCNC: 160 MG/DL (ref 65–140)
GLUCOSE SERPL-MCNC: 199 MG/DL (ref 65–140)
GLUCOSE SERPL-MCNC: 202 MG/DL (ref 65–140)
GLUCOSE SERPL-MCNC: 240 MG/DL (ref 65–140)
HCT VFR BLD AUTO: 30.5 % (ref 34.8–46.1)
HGB BLD-MCNC: 9.9 G/DL (ref 11.5–15.4)
IMM GRANULOCYTES # BLD AUTO: 0.05 THOUSAND/UL (ref 0–0.2)
IMM GRANULOCYTES NFR BLD AUTO: 0 % (ref 0–2)
LYMPHOCYTES # BLD AUTO: 0.74 THOUSANDS/ÂΜL (ref 0.6–4.47)
LYMPHOCYTES NFR BLD AUTO: 6 % (ref 14–44)
MAGNESIUM SERPL-MCNC: 1.9 MG/DL (ref 1.9–2.7)
MCH RBC QN AUTO: 26.8 PG (ref 26.8–34.3)
MCHC RBC AUTO-ENTMCNC: 32.5 G/DL (ref 31.4–37.4)
MCV RBC AUTO: 83 FL (ref 82–98)
MONOCYTES # BLD AUTO: 0.55 THOUSAND/ÂΜL (ref 0.17–1.22)
MONOCYTES NFR BLD AUTO: 5 % (ref 4–12)
NEUTROPHILS # BLD AUTO: 10.29 THOUSANDS/ÂΜL (ref 1.85–7.62)
NEUTS SEG NFR BLD AUTO: 89 % (ref 43–75)
NRBC BLD AUTO-RTO: 0 /100 WBCS
OSMOLALITY UR: 249 MMOL/KG
PLATELET # BLD AUTO: 178 THOUSANDS/UL (ref 149–390)
PMV BLD AUTO: 9.8 FL (ref 8.9–12.7)
POTASSIUM SERPL-SCNC: 3.1 MMOL/L (ref 3.5–5.3)
QRS AXIS: -7 DEGREES
QRSD INTERVAL: 80 MS
QT INTERVAL: 364 MS
QTC INTERVAL: 548 MS
RBC # BLD AUTO: 3.69 MILLION/UL (ref 3.81–5.12)
SODIUM 24H UR-SCNC: 12 MOL/L
SODIUM SERPL-SCNC: 131 MMOL/L (ref 135–147)
T WAVE AXIS: 96 DEGREES
VENTRICULAR RATE: 136 BPM
WBC # BLD AUTO: 11.66 THOUSAND/UL (ref 4.31–10.16)

## 2024-11-12 PROCEDURE — 85025 COMPLETE CBC W/AUTO DIFF WBC: CPT

## 2024-11-12 PROCEDURE — 99233 SBSQ HOSP IP/OBS HIGH 50: CPT | Performed by: INTERNAL MEDICINE

## 2024-11-12 PROCEDURE — 80048 BASIC METABOLIC PNL TOTAL CA: CPT

## 2024-11-12 PROCEDURE — 84300 ASSAY OF URINE SODIUM: CPT | Performed by: FAMILY MEDICINE

## 2024-11-12 PROCEDURE — 82948 REAGENT STRIP/BLOOD GLUCOSE: CPT

## 2024-11-12 PROCEDURE — 83735 ASSAY OF MAGNESIUM: CPT | Performed by: FAMILY MEDICINE

## 2024-11-12 PROCEDURE — 99233 SBSQ HOSP IP/OBS HIGH 50: CPT | Performed by: FAMILY MEDICINE

## 2024-11-12 PROCEDURE — 93010 ELECTROCARDIOGRAM REPORT: CPT | Performed by: INTERNAL MEDICINE

## 2024-11-12 PROCEDURE — 93005 ELECTROCARDIOGRAM TRACING: CPT

## 2024-11-12 PROCEDURE — 83935 ASSAY OF URINE OSMOLALITY: CPT | Performed by: FAMILY MEDICINE

## 2024-11-12 RX ORDER — DIPHENHYDRAMINE HYDROCHLORIDE, ZINC ACETATE 2; .1 G/100G; G/100G
CREAM TOPICAL 3 TIMES DAILY PRN
Status: DISCONTINUED | OUTPATIENT
Start: 2024-11-12 | End: 2024-11-18 | Stop reason: HOSPADM

## 2024-11-12 RX ORDER — DIPHENHYDRAMINE HYDROCHLORIDE 50 MG/ML
25 INJECTION INTRAMUSCULAR; INTRAVENOUS EVERY 6 HOURS PRN
Status: DISCONTINUED | OUTPATIENT
Start: 2024-11-12 | End: 2024-11-12

## 2024-11-12 RX ORDER — DILTIAZEM HCL 60 MG
60 TABLET ORAL EVERY 6 HOURS SCHEDULED
Status: DISCONTINUED | OUTPATIENT
Start: 2024-11-12 | End: 2024-11-13

## 2024-11-12 RX ORDER — CEFAZOLIN SODIUM 2 G/50ML
2000 SOLUTION INTRAVENOUS EVERY 8 HOURS
Status: DISCONTINUED | OUTPATIENT
Start: 2024-11-12 | End: 2024-11-18 | Stop reason: HOSPADM

## 2024-11-12 RX ORDER — POTASSIUM CHLORIDE 1500 MG/1
40 TABLET, EXTENDED RELEASE ORAL ONCE
Status: COMPLETED | OUTPATIENT
Start: 2024-11-12 | End: 2024-11-12

## 2024-11-12 RX ORDER — POTASSIUM CHLORIDE 14.9 MG/ML
20 INJECTION INTRAVENOUS ONCE
Status: COMPLETED | OUTPATIENT
Start: 2024-11-12 | End: 2024-11-12

## 2024-11-12 RX ADMIN — SODIUM CHLORIDE, SODIUM GLUCONATE, SODIUM ACETATE, POTASSIUM CHLORIDE, MAGNESIUM CHLORIDE, SODIUM PHOSPHATE, DIBASIC, AND POTASSIUM PHOSPHATE 100 ML/HR: .53; .5; .37; .037; .03; .012; .00082 INJECTION, SOLUTION INTRAVENOUS at 14:46

## 2024-11-12 RX ADMIN — APIXABAN 5 MG: 5 TABLET, FILM COATED ORAL at 10:10

## 2024-11-12 RX ADMIN — ACETAMINOPHEN 650 MG: 325 TABLET ORAL at 19:45

## 2024-11-12 RX ADMIN — INSULIN LISPRO 2 UNITS: 100 INJECTION, SOLUTION INTRAVENOUS; SUBCUTANEOUS at 12:10

## 2024-11-12 RX ADMIN — INSULIN LISPRO 1 UNITS: 100 INJECTION, SOLUTION INTRAVENOUS; SUBCUTANEOUS at 10:18

## 2024-11-12 RX ADMIN — PRAVASTATIN SODIUM 40 MG: 40 TABLET ORAL at 17:35

## 2024-11-12 RX ADMIN — DILTIAZEM HYDROCHLORIDE 60 MG: 60 TABLET ORAL at 12:03

## 2024-11-12 RX ADMIN — CEFAZOLIN SODIUM 2000 MG: 2 SOLUTION INTRAVENOUS at 18:05

## 2024-11-12 RX ADMIN — POTASSIUM CHLORIDE 20 MEQ: 14.9 INJECTION, SOLUTION INTRAVENOUS at 10:24

## 2024-11-12 RX ADMIN — LEVOTHYROXINE SODIUM 125 MCG: 125 TABLET ORAL at 05:16

## 2024-11-12 RX ADMIN — DILTIAZEM HYDROCHLORIDE 60 MG: 60 TABLET ORAL at 17:40

## 2024-11-12 RX ADMIN — POTASSIUM CHLORIDE 40 MEQ: 1500 TABLET, EXTENDED RELEASE ORAL at 10:10

## 2024-11-12 RX ADMIN — DILTIAZEM HYDROCHLORIDE 60 MG: 60 TABLET ORAL at 23:26

## 2024-11-12 RX ADMIN — APIXABAN 5 MG: 5 TABLET, FILM COATED ORAL at 17:40

## 2024-11-12 RX ADMIN — CEFAZOLIN SODIUM 2000 MG: 2 SOLUTION INTRAVENOUS at 11:03

## 2024-11-12 RX ADMIN — INSULIN LISPRO 2 UNITS: 100 INJECTION, SOLUTION INTRAVENOUS; SUBCUTANEOUS at 17:36

## 2024-11-12 NOTE — ASSESSMENT & PLAN NOTE
Lab Results   Component Value Date    HGBA1C 7.8 (H) 11/11/2024     Recent Labs     11/11/24  1103 11/11/24  1559 11/11/24  2137 11/12/24  0723   POCGLU 212* 205* 204* 160*   This is a risk factor for infection.    - Maintain euglycemia to improve WBC function and improve wound healing   - Needs regular Podiatry follow up for foot care

## 2024-11-12 NOTE — ASSESSMENT & PLAN NOTE
Lab Results   Component Value Date    HGBA1C 7.8 (H) 11/11/2024       Recent Labs     11/11/24  1103 11/11/24  1559 11/11/24  2137 11/12/24  0723   POCGLU 212* 205* 204* 160*       Blood Sugar Average: Last 72 hrs:  (P) [id number]  Holding home Jardiance/Glyxambi  Patient placed on sliding scale coverage with ACHS checks  Consistent carb diet  Hypoglycemia protocol.

## 2024-11-12 NOTE — ASSESSMENT & PLAN NOTE
Lab Results   Component Value Date    EGFR 43 11/12/2024    EGFR 36 11/11/2024    EGFR 39 11/10/2024    CREATININE 1.20 11/12/2024    CREATININE 1.39 (H) 11/11/2024    CREATININE 1.30 11/10/2024   Estimated Creatinine Clearance: 39.2 mL/min (by C-G formula based on SCr of 1.2 mg/dL).   - No adjustment needed for cefazolin or cephalexin at this CrCl  - Dose adjust other medications

## 2024-11-12 NOTE — PLAN OF CARE
Problem: PAIN - ADULT  Goal: Verbalizes/displays adequate comfort level or baseline comfort level  Description: Interventions:  - Encourage patient to monitor pain and request assistance  - Assess pain using appropriate pain scale  - Administer analgesics based on type and severity of pain and evaluate response  - Implement non-pharmacological measures as appropriate and evaluate response  - Consider cultural and social influences on pain and pain management  - Notify physician/advanced practitioner if interventions unsuccessful or patient reports new pain  Outcome: Progressing     Problem: INFECTION - ADULT  Goal: Absence or prevention of progression during hospitalization  Description: INTERVENTIONS:  - Assess and monitor for signs and symptoms of infection  - Monitor lab/diagnostic results  - Monitor all insertion sites, i.e. indwelling lines, tubes, and drains  - Monitor endotracheal if appropriate and nasal secretions for changes in amount and color  - [place] appropriate cooling/warming therapies per order  - Administer medications as ordered  - Instruct and encourage patient and family to use good hand hygiene technique  - Identify and instruct in appropriate isolation precautions for identified infection/condition  Outcome: Progressing

## 2024-11-12 NOTE — ASSESSMENT & PLAN NOTE
"/80   Pulse (!) 142   Temp 99.4 °F (37.4 °C)   Resp 18   Ht 5' 4\" (1.626 m)   Wt 76 kg (167 lb 8.8 oz)   SpO2 95%   BMI 28.76 kg/m²   EKG shows rapid afib  Holding patient's home Toprol-XL/diltiazem due to hypotension- likely reason for rapid afib  BP is better this am- start cardizem 60mg Q6H (takes 360mg at home). If stable BP after this, resume toprol xl  Monitor on tele  Keep potassium close to 4 and mag 2  Continue Eliquis.  "

## 2024-11-12 NOTE — ASSESSMENT & PLAN NOTE
Blood cultures on 11/10 isolated group A Streptococcus.  Likely source is lower extremity stasis dermatitis/skin breaks.  - Change ceftriaxone to cefazolin 2 g every 8 hours (no beta-lactam side chain similarity) in s/o rash  - Follow BMP and CBCD to monitor for medication toxicities and treatment response  - Anticipate total 14-day antibiotic course, can likely finish with oral antibiotics if further improving tomorrow  - Would complete course with cephalexin 1 g every 6 hours through 11/23/24

## 2024-11-12 NOTE — PROGRESS NOTES
Progress Note - Infectious Disease   Name: Jenny Galarza 79 y.o. female I MRN: [id number]  Unit/Bed#: -01 I Date of Admission: 11/10/2024   Date of Service: 11/12/2024 I Hospital Day: 2     Assessment & Plan  Sepsis (HCC)  As evidenced by fever and leukocytosis.  Due to group A strep bacteremia.  Improving on appropriate antibiotics.  - Continue antibiotics and further management as below  - Trend fever, WBC count, vitals  - Additional supportive care per primary team  Streptococcal bacteremia  Blood cultures on 11/10 isolated group A Streptococcus.  Likely source is lower extremity stasis dermatitis/skin breaks.  - Change ceftriaxone to cefazolin 2 g every 8 hours (no beta-lactam side chain similarity) in s/o rash  - Follow BMP and CBCD to monitor for medication toxicities and treatment response  - Anticipate total 14-day antibiotic course, can likely finish with oral antibiotics if further improving tomorrow  - Would complete course with cephalexin 1 g every 6 hours through 11/23/24    Rash  Appears consistent with drug reaction.  Pruritic but no hives.  See images in media 11/12/24.  History of beta-lactam allergies in the past.  - Change to cefazolin as above  - Added ceftriaxone to allergy list, but this is a non-severe allergy and other cephalosporins may be tolerated  Venous stasis dermatitis of both lower extremities  Likely source of bacteremia.  No overt signs of cellulitis, but she does have tenderness and indurated skin, as well as multiple areas of dry cracked skin.  There is weeping superficial wound on the left leg.  Long history of venous insufficiency.  - Antibiotics as above  - Needs risk factor reduction: [place] use of emollients to dry cracked skin, aggressive leg elevation, compression or Ace wrap as tolerated  Diabetes mellitus, type 2 (HCC)  Lab Results   Component Value Date    HGBA1C 7.8 (H) 11/11/2024     Recent Labs     11/11/24  1103 11/11/24  1559 11/11/24  2137 11/12/24  0723    POCGLU 212* 205* 204* 160*   This is a risk factor for infection.    - Maintain euglycemia to improve WBC function and improve wound healing   - Needs regular Podiatry follow up for foot care  Afib (HCC)  Some episodes of uncontrolled rate likely in the setting of infection.  Should continue to improve as infection is treated.  - Management otherwise per primary team  Stage III chronic kidney disease (HCC)  Lab Results   Component Value Date    EGFR 43 2024    EGFR 36 2024    EGFR 39 11/10/2024    CREATININE 1.20 2024    CREATININE 1.39 (H) 2024    CREATININE 1.30 11/10/2024   Estimated Creatinine Clearance: 39.2 mL/min (by C-G formula based on SCr of 1.2 mg/dL).   - No adjustment needed for cefazolin or cephalexin at this CrCl  - Dose adjust other medications       I have discussed the above management plan in detail with the primary service, Dr. Dick, who agrees with antibiotic change and course as above.    I have performed an extensive review of the medical records in Epic including review of the notes, radiographs, and laboratory results.    Antibiotics:  Ceftriaxone day 3    24 Hour Events:   No events    Subjective:  Patient noted developing pruritic rash over the arms, chest, back.  No fevers, chills, oral lesions, tongue/throat/lip swelling, or dyspnea.    Objective:  Vitals:  Temp:  [97.5 °F (36.4 °C)-99.4 °F (37.4 °C)] 99.4 °F (37.4 °C)  HR:  [] 142  Resp:  [18] 18  BP: (105-122)/(76-80) 122/80  SpO2:  [51 %-96 %] 95 %  Temp (24hrs), Av.2 °F (36.8 °C), Min:97.5 °F (36.4 °C), Max:99.4 °F (37.4 °C)  Current: Temperature: 99.4 °F (37.4 °C)    Physical Exam:   General:  Well developed, no acute distress  HEENT:  Normocephalic, sclera anicteric, MMM  Lungs:  Unlabored respirations   Abdomen:  Soft, nontender, nondistended, normal bowel sounds  Extremities:  Chronic woody edema bilaterally   Skin:  LLE erythematous, dry, cracked skin  Neuro:  Awake, alert, interactive         Labs:   All pertinent labs and imaging studies were personally reviewed  Results from last 7 days   Lab Units 11/12/24  0510 11/11/24  0552 11/10/24  1208   WBC Thousand/uL 11.66* 14.30* 17.29*   HEMOGLOBIN g/dL 9.9* 10.5* 12.2   PLATELETS Thousands/uL 178 178 251     Results from last 7 days   Lab Units 11/12/24  0510 11/11/24  0552 11/10/24  1208   SODIUM mmol/L 131* 133* 131*   POTASSIUM mmol/L 3.1* 3.5 3.4*   CHLORIDE mmol/L 100 100 96   CO2 mmol/L 22 21 24   BUN mg/dL 26* 23 26*   CREATININE mg/dL 1.20 1.39* 1.30   EGFR ml/min/1.73sq m 43 36 39   CALCIUM mg/dL 7.4* 7.8* 8.8   AST U/L  --  25 19   ALT U/L  --  16 13   ALK PHOS U/L  --  68 75     Results from last 7 days   Lab Units 11/11/24  0552 11/10/24  1208   PROCALCITONIN ng/ml 41.52* 3.70*                   Microbiology:  Results from last 7 days   Lab Units 11/10/24  2101 11/10/24  1421 11/10/24  1415   BLOOD CULTURE   --  No Growth at 24 hrs.  --    GRAM STAIN RESULT   --   --  Gram positive cocci in pairs and chains*   URINE CULTURE  No Growth <1000 cfu/mL  --   --        Imaging:  No new imaging.      Pete Lion MD  [facility]

## 2024-11-12 NOTE — ASSESSMENT & PLAN NOTE
Check urine studies  Pt is on home diuretics and on hold for now due to low BP  Plan to resume if BP improves

## 2024-11-12 NOTE — ASSESSMENT & PLAN NOTE
Start benadyl cream  Avoid systemic benadryl for now unless symptoms not controlled to topical  Likely allergy based   Antibiotic switched to IV cefazolin from ceftriaxone

## 2024-11-12 NOTE — ASSESSMENT & PLAN NOTE
Meeting criteria due to tachycardia/leukocytosis with suspected UTI  Continues to have tachycardia, leukocytosis improving  Normal lactic acid but procalcitonin is elevated at 41, recheck tomorrow  UA positive, follow-up urine culture shows no growth  Follow-up blood culture: Shows gram-positive cocci in pairs and chains/group A streptococcus in 1 of 2 cultures  Flu RSV COVID-negative  CT chest abdomen pelvis with no acute infectious findings  Switch antibiotics to cefazolin from ceftriaxone considering the rash could be allergy to ceftriaxone  2 L IV fluid bolus given in ED, continue with maintenance fluids.

## 2024-11-12 NOTE — PLAN OF CARE
Problem: PAIN - ADULT  Goal: Verbalizes/displays adequate comfort level or baseline comfort level  Description: Interventions:  - Encourage patient to monitor pain and request assistance  - Assess pain using appropriate pain scale  - Administer analgesics based on type and severity of pain and evaluate response  - Implement non-pharmacological measures as appropriate and evaluate response  - Consider cultural and social influences on pain and pain management  - Notify physician/advanced practitioner if interventions unsuccessful or patient reports new pain  Outcome: Progressing     Problem: INFECTION - ADULT  Goal: Absence or prevention of progression during hospitalization  Description: INTERVENTIONS:  - Assess and monitor for signs and symptoms of infection  - Monitor lab/diagnostic results  - Monitor all insertion sites, i.e. indwelling lines, tubes, and drains  - Monitor endotracheal if appropriate and nasal secretions for changes in amount and color  - [place] appropriate cooling/warming therapies per order  - Administer medications as ordered  - Instruct and encourage patient and family to use good hand hygiene technique  - Identify and instruct in appropriate isolation precautions for identified infection/condition  Outcome: Progressing     Problem: SAFETY ADULT  Goal: Patient will remain free of falls  Description: INTERVENTIONS:  - Educate patient/family on patient safety including physical limitations  - Instruct patient to call for assistance with activity   - Consult OT/PT to assist with strengthening/mobility   - Keep Call bell within reach  - Keep bed low and locked with side rails adjusted as appropriate  - Keep care items and personal belongings within reach  - Initiate and maintain comfort rounds  - Make Fall Risk Sign visible to staff  - Offer Toileting every 2 Hours, in advance of need  - Apply yellow socks and bracelet for high fall risk patients  - Consider moving patient to room near nurses  station  Outcome: Progressing

## 2024-11-12 NOTE — ASSESSMENT & PLAN NOTE
Appears consistent with drug reaction.  Pruritic but no hives.  See images in media 11/12/24.  History of beta-lactam allergies in the past.  - Change to cefazolin as above  - Added ceftriaxone to allergy list, but this is a non-severe allergy and other cephalosporins may be tolerated

## 2024-11-12 NOTE — PROGRESS NOTES
Progress Note - Hospitalist   Name: Jenny Galarza 79 y.o. female I MRN: [id number]  Unit/Bed#: -01 I Date of Admission: 11/10/2024   Date of Service: 11/12/2024 I Hospital Day: 2    Assessment & Plan  Sepsis (HCC)  Meeting criteria due to tachycardia/leukocytosis with suspected UTI  Continues to have tachycardia, leukocytosis improving  Normal lactic acid but procalcitonin is elevated at 41, recheck tomorrow  UA positive, follow-up urine culture shows no growth  Follow-up blood culture: Shows gram-positive cocci in pairs and chains/group A streptococcus in 1 of 2 cultures  Flu RSV COVID-negative  CT chest abdomen pelvis with no acute infectious findings  Switch antibiotics to cefazolin from ceftriaxone considering the rash could be allergy to ceftriaxone  2 L IV fluid bolus given in ED, continue with maintenance fluids.  Streptococcal bacteremia  Patient went 1/2 blood culture positive for Streptococcus pyogenes, unclear source of infection.  Per infectious disease recommending to continue with IV ceftriaxone  Recommended Doppler ultrasound to rule out septic thrombophlebitis    Muscle weakness (generalized)  Coming in with generalized weakness/poor oral intake/lightheadedness/chills  Patient also noted to be hypotensive in the ER with systolic blood pressure readings in the 90s which is now improved  Patient receiving empiric IV fluids/IV antibiotics  Hold home antihypertensive agents.  PT OT caden requested  Hypothyroidism  Continue with Synthroid  Hyperlipidemia, mixed  Continue statin.  Diabetes mellitus, type 2 (HCC)  Lab Results   Component Value Date    HGBA1C 7.8 (H) 11/11/2024       Recent Labs     11/11/24  1103 11/11/24  1559 11/11/24  2137 11/12/24  0723   POCGLU 212* 205* 204* 160*       Blood Sugar Average: Last 72 hrs:  (P) [id number]  Holding home Jardiance/Glyxambi  Patient placed on sliding scale coverage with ACHS checks  Consistent carb diet  Hypoglycemia protocol.  Paroxysmal  "atrial fibrillation (HCC) in RVR  /80   Pulse (!) 142   Temp 99.4 °F (37.4 °C)   Resp 18   Ht 5' 4\" (1.626 m)   Wt 76 kg (167 lb 8.8 oz)   SpO2 95%   BMI 28.76 kg/m²   EKG shows rapid afib  Holding patient's home Toprol-XL/diltiazem due to hypotension- likely reason for rapid afib  BP is better this am- start cardizem 60mg Q6H (takes 360mg at home). If stable BP after this, resume toprol xl  Monitor on tele  Keep potassium close to 4 and mag 2  Continue Eliquis.  Stage III chronic kidney disease (HCC)  Lab Results   Component Value Date    EGFR 43 11/12/2024    EGFR 36 11/11/2024    EGFR 39 11/10/2024    CREATININE 1.20 11/12/2024    CREATININE 1.39 (H) 11/11/2024    CREATININE 1.30 11/10/2024     Stable renal function  Noted slightly elevation of creatinine from her baseline 1.1-1.2  Suspected in the setting of bacteremia.  Continue with IV fluids and reassess BMP in the morning.  Avoid nephrotoxic agents and hypoperfusion.  Venous stasis dermatitis of both lower extremities  Obtain abdominal ultrasound  HYPO-OSMOLALITY AND HYPONATREMIA  Check urine studies  Pt is on home diuretics and on hold for now due to low BP  Plan to resume if BP improves  Rash  Start benadyl cream  Avoid systemic benadryl for now unless symptoms not controlled to topical  Likely allergy based   Antibiotic switched to IV cefazolin from ceftriaxone    VTE Pharmacologic Prophylaxis: VTE Score: 5 High Risk (Score >/= 5) - Pharmacological DVT Prophylaxis Ordered: apixaban (Eliquis). Sequential Compression Devices Ordered.    Mobility:   Basic Mobility Inpatient Raw Score: 21  JH-HLM Goal: 6: Walk 10 steps or more  JH-HLM Achieved: 7: Walk 25 feet or more  JH-HLM Goal achieved. Continue to encourage appropriate mobility.    Patient Centered Rounds: I performed bedside rounds with nursing staff today.   Discussions with Specialists or Other Care Team Provider: Infectious disease    Education and Discussions with Family / Patient:  " offered to call patient's family, patient declined respectfully.     Current Length of Stay: 2 day(s)  Current Patient Status: Inpatient   Certification Statement: The patient will continue to require additional inpatient hospital stay due to ongoing need for IV antibiotics  Discharge Plan: Anticipate discharge in 48 hrs to home with home services.    Code Status: Level 1 - Full Code    Subjective   Patient seen and examined at bedside during a.m. rounds  Feels better than on admission  No palpitations or cp or sob  Skin rash noted on b/l forearms, groin and legs    Objective :  Temp:  [97.5 °F (36.4 °C)-99.4 °F (37.4 °C)] 99.4 °F (37.4 °C)  HR:  [] 142  BP: (105-122)/(76-80) 122/80  Resp:  [18] 18  SpO2:  [51 %-96 %] 95 %  O2 Device: None (Room air)    Body mass index is 28.76 kg/m².     Input and Output Summary (last 24 hours):     Intake/Output Summary (Last 24 hours) at 11/12/2024 0908  Last data filed at 11/12/2024 0813  Gross per 24 hour   Intake 1720 ml   Output 1000 ml   Net 720 ml       Physical Exam  General- Awake, alert and oriented x 3, looks comfortable  HEENT- Normocephalic, atraumatic, oral mucosa- moist  Neck- Supple, No carotid bruit, no JVD  CVS- Normal S1/ S2, afib rvr, No murmur, +1 pitting pedal edema  Respiratory system- B/L clear breath sounds, no wheezing  Abdomen- Soft, Non distended, no tenderness, Bowel sound- present 4 quads  Genitourinary- No suprapubic tenderness, No CVA tenderness  Skin- red rash on the forearm , groin and b/l legs, pruritic, non tender, non blanching and diffuse  Musculoskeletal- No gross deformity  Psych- No acute psychosis  CNS- CN II- XII grossly intact, No acute focal neurologic deficit noted      Lines/Drains:    Peripheral IV          Lab Results: I have reviewed the following results:   Results from last 7 days   Lab Units 11/12/24  0510   WBC Thousand/uL 11.66*   HEMOGLOBIN g/dL 9.9*   HEMATOCRIT % 30.5*   PLATELETS Thousands/uL 178   SEGS PCT % 89*    LYMPHO PCT % 6*   MONO PCT % 5   EOS PCT % 0     Results from last 7 days   Lab Units 11/12/24  0510 11/11/24  0552   SODIUM mmol/L 131* 133*   POTASSIUM mmol/L 3.1* 3.5   CHLORIDE mmol/L 100 100   CO2 mmol/L 22 21   BUN mg/dL 26* 23   CREATININE mg/dL 1.20 1.39*   ANION GAP mmol/L 9 12   CALCIUM mg/dL 7.4* 7.8*   ALBUMIN g/dL  --  3.4*   TOTAL BILIRUBIN mg/dL  --  1.14*   ALK PHOS U/L  --  68   ALT U/L  --  16   AST U/L  --  25   GLUCOSE RANDOM mg/dL 159* 130         Results from last 7 days   Lab Units 11/12/24  0723 11/11/24  2137 11/11/24  1559 11/11/24  1103 11/11/24  0750 11/10/24  2051 11/10/24  1834   POC GLUCOSE mg/dl 160* 204* 205* 212* 134 253* 165*     Results from last 7 days   Lab Units 11/11/24  0552   HEMOGLOBIN A1C % 7.8*     Results from last 7 days   Lab Units 11/11/24  0552 11/10/24  1421 11/10/24  1208   LACTIC ACID mmol/L  --  0.7  --    PROCALCITONIN ng/ml 41.52*  --  3.70*       Recent Cultures (last 7 days):   Results from last 7 days   Lab Units 11/10/24  2101 11/10/24  1421 11/10/24  1415   BLOOD CULTURE   --  No Growth at 24 hrs.  --    GRAM STAIN RESULT   --   --  Gram positive cocci in pairs and chains*   URINE CULTURE  No Growth <1000 cfu/mL  --   --        Imaging Results Review: I reviewed radiology reports from this admission including: CT chest abdomen pelvis with contrast showed pulmonary nodules, intrahepatic biliary ductal dilatation in the right hepatic lobe secondary to prior cholecystectomy, multiple pancreatic cysts mildly enlarged from 2019 and will need outpatient MRI and MRCP.  This has been discussed with the patient in detail..  Other Study Results Review: Other studies reviewed include: Blood cultures, urine cultures    Last 24 Hours Medication List:     Current Facility-Administered Medications:     acetaminophen (TYLENOL) tablet 650 mg, Q6H PRN    apixaban (ELIQUIS) tablet 5 mg, BID    cefTRIAXone (ROCEPHIN) 2,000 mg in dextrose 5 % 50 mL IVPB, Q24H    insulin  lispro (HumALOG/ADMELOG) 100 units/mL subcutaneous injection 1-5 Units, TID AC **AND** Fingerstick Glucose (POCT), TID AC    levothyroxine tablet 125 mcg, Early Morning    multi-electrolyte (PLASMALYTE-A/ISOLYTE-S PH 7.4) IV solution, Continuous, Last Rate: 100 mL/hr (11/11/24 1710)    oxyCODONE (ROXICODONE) IR tablet 5 mg, Q6H PRN    pravastatin (PRAVACHOL) tablet 40 mg, Daily With Dinner    Administrative Statements   Today, Patient Was Seen By: Lex Dick MD  I have spent a total time of 54 minutes in caring for this patient on the day of the visit/encounter including Diagnostic results, Prognosis, Risks and benefits of tx options, Instructions for management, Patient and family education, Importance of tx compliance, Risk factor reductions, Impressions, Counseling / Coordination of care, Documenting in the medical record, Reviewing / ordering tests, medicine, procedures  , Obtaining or reviewing history  , and Communicating with other healthcare professionals .    **Please Note: This note may have been constructed using a voice recognition system.**

## 2024-11-12 NOTE — ASSESSMENT & PLAN NOTE
Lab Results   Component Value Date    EGFR 43 11/12/2024    EGFR 36 11/11/2024    EGFR 39 11/10/2024    CREATININE 1.20 11/12/2024    CREATININE 1.39 (H) 11/11/2024    CREATININE 1.30 11/10/2024     Stable renal function  Noted slightly elevation of creatinine from her baseline 1.1-1.2  Suspected in the setting of bacteremia.  Continue with IV fluids and reassess BMP in the morning.  Avoid nephrotoxic agents and hypoperfusion.

## 2024-11-12 NOTE — ASSESSMENT & PLAN NOTE
Coming in with generalized weakness/poor oral intake/lightheadedness/chills  Patient also noted to be hypotensive in the ER with systolic blood pressure readings in the 90s which is now improved  Patient receiving empiric IV fluids/IV antibiotics  Hold home antihypertensive agents.  PT OT caden requested

## 2024-11-12 NOTE — CASE MANAGEMENT
Case Management Progress Note    Patient name Jenny Galarza  Location /-01 MRN [id number]  : 1945 Date 2024       LOS (days): 2  Geometric Mean LOS (GMLOS) (days): 3.5  Days to GMLOS:1.6        OBJECTIVE:        Current admission status: Inpatient  Preferred Pharmacy:   [facility]/pharmacy #[id number] - [place] - [address]  [address]  [facility] [address]  Phone: [phone] Fax: [phone]    Primary Care Provider: Kathya Irene DO    Primary Insurance: [employer] RE[employer]  Secondary Insurance:     PROGRESS NOTE:  CM reviewed drug  patient assistance programs with patient at bedside for eliquis and glyxambi. CM also provided $10 copay  cards for each. Patient accepted, verbalized understanding of applications and process.

## 2024-11-12 NOTE — ASSESSMENT & PLAN NOTE
Likely source of bacteremia.  No overt signs of cellulitis, but she does have tenderness and indurated skin, as well as multiple areas of dry cracked skin.  There is weeping superficial wound on the left leg.  Long history of venous insufficiency.  - Antibiotics as above  - Needs risk factor reduction: [place] use of emollients to dry cracked skin, aggressive leg elevation, compression or Ace wrap as tolerated

## 2024-11-13 ENCOUNTER — APPOINTMENT (INPATIENT)
Dept: NON INVASIVE DIAGNOSTICS | Facility: HOSPITAL | Age: 79
DRG: 872 | End: 2024-11-13
Payer: COMMERCIAL

## 2024-11-13 PROBLEM — I48.19 PERSISTENT ATRIAL FIBRILLATION WITH RVR (HCC): Status: ACTIVE | Noted: 2024-11-13

## 2024-11-13 PROBLEM — E78.5 DYSLIPIDEMIA: Status: ACTIVE | Noted: 2024-11-13

## 2024-11-13 LAB
ANION GAP SERPL CALCULATED.3IONS-SCNC: 13 MMOL/L (ref 4–13)
AORTIC ROOT: 2.7 CM
APICAL FOUR CHAMBER EJECTION FRACTION: 61 %
ASCENDING AORTA: 2.9 CM
BACTERIA BLD CULT: ABNORMAL
BASOPHILS # BLD AUTO: 0.01 THOUSANDS/ÂΜL (ref 0–0.1)
BASOPHILS NFR BLD AUTO: 0 % (ref 0–1)
BSA FOR ECHO PROCEDURE: 1.81 M2
BUN SERPL-MCNC: 14 MG/DL (ref 5–25)
CALCIUM SERPL-MCNC: 6.3 MG/DL (ref 8.4–10.2)
CHLORIDE SERPL-SCNC: 102 MMOL/L (ref 96–108)
CO2 SERPL-SCNC: 20 MMOL/L (ref 21–32)
CREAT SERPL-MCNC: 0.79 MG/DL (ref 0.6–1.3)
E WAVE DECELERATION TIME: 102 MS
E/A RATIO: 1.34
EOSINOPHIL # BLD AUTO: 0 THOUSAND/ÂΜL (ref 0–0.61)
EOSINOPHIL NFR BLD AUTO: 0 % (ref 0–6)
ERYTHROCYTE [DISTWIDTH] IN BLOOD BY AUTOMATED COUNT: 15 % (ref 11.6–15.1)
FRACTIONAL SHORTENING: 21 (ref 28–44)
GFR SERPL CREATININE-BSD FRML MDRD: 72 ML/MIN/1.73SQ M
GLUCOSE SERPL-MCNC: 134 MG/DL (ref 65–140)
GLUCOSE SERPL-MCNC: 174 MG/DL (ref 65–140)
GLUCOSE SERPL-MCNC: 239 MG/DL (ref 65–140)
GLUCOSE SERPL-MCNC: 245 MG/DL (ref 65–140)
GLUCOSE SERPL-MCNC: 303 MG/DL (ref 65–140)
GLUCOSE SERPL-MCNC: 304 MG/DL (ref 65–140)
GRAM STN SPEC: ABNORMAL
HCT VFR BLD AUTO: 28.2 % (ref 34.8–46.1)
HGB BLD-MCNC: 9.1 G/DL (ref 11.5–15.4)
IMM GRANULOCYTES # BLD AUTO: 0.06 THOUSAND/UL (ref 0–0.2)
IMM GRANULOCYTES NFR BLD AUTO: 1 % (ref 0–2)
INTERVENTRICULAR SEPTUM IN DIASTOLE (PARASTERNAL SHORT AXIS VIEW): 0.8 CM
INTERVENTRICULAR SEPTUM: 0.8 CM (ref 0.6–1.1)
LA/AORTA RATIO 2D: 1.59
LAAS-AP2: 20.8 CM2
LAAS-AP4: 27.2 CM2
LEFT ATRIUM SIZE: 4.3 CM
LEFT ATRIUM VOLUME (MOD BIPLANE): 79 ML
LEFT ATRIUM VOLUME INDEX (MOD BIPLANE): 43.6 ML/M2
LEFT INTERNAL DIMENSION IN SYSTOLE: 3.7 CM (ref 2.1–4)
LEFT VENTRICULAR INTERNAL DIMENSION IN DIASTOLE: 4.7 CM (ref 3.5–6)
LEFT VENTRICULAR POSTERIOR WALL IN END DIASTOLE: 0.9 CM
LEFT VENTRICULAR STROKE VOLUME: 44 ML
LVSV (TEICH): 44 ML
LYMPHOCYTES # BLD AUTO: 0.49 THOUSANDS/ÂΜL (ref 0.6–4.47)
LYMPHOCYTES NFR BLD AUTO: 5 % (ref 14–44)
MCH RBC QN AUTO: 27 PG (ref 26.8–34.3)
MCHC RBC AUTO-ENTMCNC: 32.3 G/DL (ref 31.4–37.4)
MCV RBC AUTO: 84 FL (ref 82–98)
MONOCYTES # BLD AUTO: 0.55 THOUSAND/ÂΜL (ref 0.17–1.22)
MONOCYTES NFR BLD AUTO: 5 % (ref 4–12)
MV E'TISSUE VEL-SEP: 18 CM/S
MV PEAK A VEL: 1.12 M/S
MV PEAK E VEL: 150 CM/S
MV STENOSIS PRESSURE HALF TIME: 30 MS
MV VALVE AREA P 1/2 METHOD: 7.33
NEUTROPHILS # BLD AUTO: 9.54 THOUSANDS/ÂΜL (ref 1.85–7.62)
NEUTS SEG NFR BLD AUTO: 89 % (ref 43–75)
NRBC BLD AUTO-RTO: 0 /100 WBCS
PLATELET # BLD AUTO: 182 THOUSANDS/UL (ref 149–390)
PMV BLD AUTO: 10.1 FL (ref 8.9–12.7)
POTASSIUM SERPL-SCNC: 3.5 MMOL/L (ref 3.5–5.3)
PROCALCITONIN SERPL-MCNC: 12.52 NG/ML
RBC # BLD AUTO: 3.37 MILLION/UL (ref 3.81–5.12)
RIGHT VENTRICLE ID DIMENSION: 3 CM
SL CV LV EF: 65
SL CV PED ECHO LEFT VENTRICLE DIASTOLIC VOLUME (MOD BIPLANE) 2D: 102 ML
SL CV PED ECHO LEFT VENTRICLE SYSTOLIC VOLUME (MOD BIPLANE) 2D: 58 ML
SODIUM SERPL-SCNC: 135 MMOL/L (ref 135–147)
TR MAX PG: 23 MMHG
TR PEAK VELOCITY: 2.4 M/S
TRICUSPID ANNULAR PLANE SYSTOLIC EXCURSION: 1.9 CM
TRICUSPID VALVE PEAK REGURGITATION VELOCITY: 2.42 M/S
WBC # BLD AUTO: 10.65 THOUSAND/UL (ref 4.31–10.16)

## 2024-11-13 PROCEDURE — 93970 EXTREMITY STUDY: CPT | Performed by: SURGERY

## 2024-11-13 PROCEDURE — 84145 PROCALCITONIN (PCT): CPT | Performed by: FAMILY MEDICINE

## 2024-11-13 PROCEDURE — 80048 BASIC METABOLIC PNL TOTAL CA: CPT | Performed by: FAMILY MEDICINE

## 2024-11-13 PROCEDURE — 99233 SBSQ HOSP IP/OBS HIGH 50: CPT | Performed by: FAMILY MEDICINE

## 2024-11-13 PROCEDURE — 93306 TTE W/DOPPLER COMPLETE: CPT

## 2024-11-13 PROCEDURE — 93306 TTE W/DOPPLER COMPLETE: CPT | Performed by: INTERNAL MEDICINE

## 2024-11-13 PROCEDURE — 85025 COMPLETE CBC W/AUTO DIFF WBC: CPT | Performed by: FAMILY MEDICINE

## 2024-11-13 PROCEDURE — 82948 REAGENT STRIP/BLOOD GLUCOSE: CPT

## 2024-11-13 PROCEDURE — 99223 1ST HOSP IP/OBS HIGH 75: CPT | Performed by: INTERNAL MEDICINE

## 2024-11-13 RX ORDER — METOPROLOL TARTRATE 25 MG/1
25 TABLET, FILM COATED ORAL EVERY 8 HOURS
Status: DISCONTINUED | OUTPATIENT
Start: 2024-11-13 | End: 2024-11-18 | Stop reason: HOSPADM

## 2024-11-13 RX ADMIN — INSULIN LISPRO 3 UNITS: 100 INJECTION, SOLUTION INTRAVENOUS; SUBCUTANEOUS at 15:36

## 2024-11-13 RX ADMIN — METOPROLOL TARTRATE 25 MG: 25 TABLET, FILM COATED ORAL at 18:06

## 2024-11-13 RX ADMIN — CEFAZOLIN SODIUM 2000 MG: 2 SOLUTION INTRAVENOUS at 03:53

## 2024-11-13 RX ADMIN — APIXABAN 5 MG: 5 TABLET, FILM COATED ORAL at 18:05

## 2024-11-13 RX ADMIN — CEFAZOLIN SODIUM 2000 MG: 2 SOLUTION INTRAVENOUS at 09:46

## 2024-11-13 RX ADMIN — LEVOTHYROXINE SODIUM 125 MCG: 125 TABLET ORAL at 05:25

## 2024-11-13 RX ADMIN — DILTIAZEM HYDROCHLORIDE 5 MG/HR: 5 INJECTION INTRAVENOUS at 10:04

## 2024-11-13 RX ADMIN — CEFAZOLIN SODIUM 2000 MG: 2 SOLUTION INTRAVENOUS at 18:06

## 2024-11-13 RX ADMIN — APIXABAN 5 MG: 5 TABLET, FILM COATED ORAL at 09:12

## 2024-11-13 RX ADMIN — DILTIAZEM HYDROCHLORIDE 60 MG: 60 TABLET ORAL at 05:25

## 2024-11-13 RX ADMIN — METOPROLOL TARTRATE 25 MG: 25 TABLET, FILM COATED ORAL at 09:45

## 2024-11-13 RX ADMIN — INSULIN LISPRO 1 UNITS: 100 INJECTION, SOLUTION INTRAVENOUS; SUBCUTANEOUS at 09:12

## 2024-11-13 RX ADMIN — PRAVASTATIN SODIUM 40 MG: 40 TABLET ORAL at 18:05

## 2024-11-13 NOTE — ASSESSMENT & PLAN NOTE
Meeting criteria due to tachycardia/leukocytosis with suspected UTI  Continues to have tachycardia, leukocytosis improving  Normal lactic acid but procalcitonin is elevated at 41, recheck ordered and will be followed  UA positive, follow-up urine culture shows no growth  Follow-up blood culture: Shows gram-positive cocci in pairs and chains/group A streptococcus in 1 of 2 cultures  Flu RSV COVID-negative  CT chest abdomen pelvis with no acute infectious findings  Switch antibiotics to cefazolin from ceftriaxone considering the rash could be allergy to ceftriaxone  2 L IV fluid bolus given in ED, continue with maintenance fluids.

## 2024-11-13 NOTE — CONSULTS
Consultation - Cardiology   Jenny Galarza 79 y.o. female MRN: [id number]  Unit/Bed#: ICU 07 Encounter: [id number]  11/13/24  3:01 PM    Assessment/ Plan:   Assessment & Plan  Sepsis ([facility])  Sepsis/bacteremia  Management per ID/primary team.  Suspected secondary to venous stasis dermatitis/lower extremity skin breaks.  On IV antibiotics.    Persistent atrial fibrillation with RVR (HCC)  Longstanding persistent atrial fibrillation with RVR  She remains in atrial fibrillation with RVR on telemetry.  Likely secondary to holding home AV armond blocking agents in the setting of soft BPs and sepsis.  Agree with initiation of Cardizem drip and Lopressor 25 mg every 8 hours  Continue anticoagulation with Eliquis.  [id number] 5.   Continue telemetry    Primary hypertension  Continue Lopressor, Cardizem drip per above.  Continue to monitor BPs closely.    Hyperlipidemia, mixed  Continue statin    Venous stasis dermatitis of both lower extremities  Management per primary team/ID    Stage III chronic kidney disease (HCC)  Lab Results   Component Value Date    EGFR 72 11/13/2024    EGFR 43 11/12/2024    EGFR 36 11/11/2024    CREATININE 0.79 11/13/2024    CREATININE 1.20 11/12/2024    CREATININE 1.39 (H) 11/11/2024   Management per primary team    Diabetes mellitus, type 2 ([facility])  Lab Results   Component Value Date    HGBA1C 7.8 (H) 11/11/2024       Recent Labs     11/12/24  2106 11/13/24  0701 11/13/24  1117 11/13/24  1435   POCGLU 202* 174* 239* 303*       Blood Sugar Average: Last 72 hrs:  (P) [id number]    Hypo-osmolality and hyponatremia  Management per primary team    Anemia  Management per primary team        History of Present Illness   Physician Requesting Consult: Lex Dick MD    Reason for Consult / Principal Problem: Paroxysmal atrial fibrillation    HPI: Jenny Galarza is a 79 y.o. year old female with PMHx of longstanding persistent atrial fibrillation, hypertension, hyperlipidemia, DM 2, CKD 3,  hypothyroidism, venous insufficiency who presents with weakness, chills, poor appetite, lightheadedness over the past few days.    She denies fevers, chest pain, shortness of breath, palpitations, syncopal episodes, lower extremity edema.    She was diagnosed with sepsis/bacteremia suspected secondary to venous stasis dermatitis/skin breaks in lower extremities, and started on IV antibiotics.  Her home Toprol and Cardizem were held due to hypotension.  She was noted to be in rapid atrial fibrillation, and was started on Cardizem 60 mg every 6 hours yesterday.  She remained in persistent RVR today, and was placed on Cardizem gtt. and started on Lopressor 25 mg every 8 by slim.  She remains in atrial fibrillation with tachycardia on telemetry, however medication adjustments were just recently made.      Echocardiogram today revealed EF 60-65%, mild to moderate biatrial dilation, mild MR        Per chart review, she does not follow with a cardiologist in the outpatient setting.    Inpatient consult to Cardiology  Consult performed by: Koffi Rosario PA-C  Consult ordered by: Lex Dick MD          EKG: Atrial fibrillation with RVR with PVCs versus aberrantly conducted complexes, low voltage QRS, possible anterolateral infarct  Tele: She remains in atrial fibrillation with RVR with rates in the 140s-160s    Review of Systems   Constitutional:  Positive for activity change, appetite change and chills. Negative for fever and unexpected weight change.   HENT:  Negative for ear pain and sore throat.    Eyes:  Negative for pain and redness.   Respiratory:  Negative for cough and shortness of breath.    Cardiovascular:  Negative for chest pain, palpitations and leg swelling.   Gastrointestinal:  Positive for nausea. Negative for abdominal pain and vomiting.   Genitourinary:  Negative for dysuria.   Skin:  Negative for color change and rash.   Neurological:  Positive for weakness and light-headedness. Negative for  "dizziness and syncope.   Hematological:  Does not bruise/bleed easily.   Psychiatric/Behavioral:  Negative for agitation and behavioral problems.    All other systems reviewed and are negative.      Historical Information   Past Medical History:   Diagnosis Date    A-fib (HCC)     Anemia     Chronic kidney disease     Diabetes mellitus (HCC)     Hypertension      Past Surgical History:   Procedure Laterality Date    CATARACT EXTRACTION, BILATERAL      CHOLECYSTECTOMY      GALLBLADDER SURGERY      HERNIA REPAIR      REPLACEMENT TOTAL KNEE      TONSILLECTOMY       Social History     Substance and Sexual Activity   Alcohol Use Never     Social History     Substance and Sexual Activity   Drug Use No     Social History     Tobacco Use   Smoking Status Former   Smokeless Tobacco Former       Family History:   Family History   Problem Relation Age of Onset    No Known Problems Mother     Heart disease Father     No Known Problems Sister        Meds/Allergies   all current active meds have been reviewed  Allergies   Allergen Reactions    Amoxicillin-Pot Clavulanate Rash and Hives    Bacitracin Itching and Edema     Action Taken: osorio swelling, had to go to er;     Ceftriaxone Rash     Patient received 2 doses of ceftriaxone during 2024 admission. Developed pruritic coalescing macular rash over bilateral arms, chest, back. No respiratory symptoms or swelling.  Did not appear to be hives.  Pictures available in Media tab from 24.    Clindamycin      Other reaction(s): Rash       Objective   Vitals: Blood pressure 138/100, pulse (!) 110, temperature 99.8 °F (37.7 °C), resp. rate 18, height 5' 4\" (1.626 m), weight 75.8 kg (167 lb), SpO2 94%., Body mass index is 28.67 kg/m².,   Orthostatic Blood Pressures      Flowsheet Row Most Recent Value   Blood Pressure 138/100 filed at 2024 1116   Patient Position - Orthostatic VS Lying filed at 11/10/2024 1757            Systolic (24hrs), Av , Min:108 , Max:143 "     Diastolic (24hrs), Av, Min:68, Max:100        Intake/Output Summary (Last 24 hours) at 2024 1501  Last data filed at 2024 0901  Gross per 24 hour   Intake 1020 ml   Output 1200 ml   Net -180 ml       Invasive Devices       Peripheral Intravenous Line  Duration             Peripheral IV 11/10/24 Left;Ventral (anterior) Forearm 3 days    Peripheral IV 11/10/24 Right Forearm 3 days                        Physical Exam:    GEN: Alert and oriented x 3, in no acute distress.  Well appearing and well nourished.   HEENT: Sclera anicteric, conjunctivae pink, mucous membranes moist. Oropharynx clear.   NECK: Supple, no significant JVD. Trachea midline.   HEART: Regular rhythm, normal S1 and S2, no murmurs, clicks, gallops or rubs. PMI nondisplaced, no thrills.   LUNGS: Clear to auscultation bilaterally; no wheezes, rales, or rhonchi. No increased work of breathing or signs of respiratory distress.   ABDOMEN: Soft, nontender, non-distended.   EXTREMITIES: Bilateral lower extremity edema noted.  Skin warm and well perfused, no clubbing, cyanosis, or edema.  NEURO: No focal findings. Normal speech. Mood and affect normal.   SKIN: Bilateral lower extremities with erythematous, dry, cracked skin        Lab Results:     Troponins:   Results from last 7 days   Lab Units 11/10/24  1648 11/10/24  1531 11/10/24  1208   HS TNI 0HR ng/L  --   --  10   HS TNI 2HR ng/L  --  11  --    HS TNI 4HR ng/L 8  --   --    HSTNI D4 ng/L -2  --   --        CBC with diff:   Results from last 7 days   Lab Units 24  0431 24  0510 24  0552 11/10/24  1208   WBC Thousand/uL 10.65* 11.66* 14.30* 17.29*   HEMOGLOBIN g/dL 9.1* 9.9* 10.5* 12.2   HEMATOCRIT % 28.2* 30.5* 32.8* 38.6   MCV fL 84 83 85 83   PLATELETS Thousands/uL 182 178 178 251   RBC Million/uL 3.37* 3.69* 3.87 4.66   MCH pg 27.0 26.8 27.1 26.2*   MCHC g/dL 32.3 32.5 32.0 31.6   RDW % 15.0 15.1 15.2* 14.8   MPV fL 10.1 9.8 9.6 9.5   NRBC AUTO /100 WBCs 0 0 0  0         CMP:   Results from last 7 days   Lab Units 11/13/24  0431 11/12/24  0510 11/11/24  0552 11/10/24  1208   POTASSIUM mmol/L 3.5 3.1* 3.5 3.4*   CHLORIDE mmol/L 102 100 100 96   CO2 mmol/L 20* 22 21 24   BUN mg/dL 14 26* 23 26*   CREATININE mg/dL 0.79 1.20 1.39* 1.30   CALCIUM mg/dL 6.3* 7.4* 7.8* 8.8   AST U/L  --   --  25 19   ALT U/L  --   --  16 13   ALK PHOS U/L  --   --  68 75   EGFR ml/min/1.73sq m 72 43 36 39       ** Please Note: Fluency DirectDictation voice to text software may have been used in the creation of this document. **

## 2024-11-13 NOTE — PROGRESS NOTES
"Progress Note - Hospitalist   Name: Jenny Galarza 79 y.o. female I MRN: [id number]  Unit/Bed#: -01 I Date of Admission: 11/10/2024   Date of Service: 11/13/2024 I Hospital Day: 3    Assessment & Plan  Sepsis (HCC)  Meeting criteria due to tachycardia/leukocytosis with suspected UTI  Continues to have tachycardia, leukocytosis improving  Normal lactic acid but procalcitonin is elevated at 41, recheck ordered and will be followed  UA positive, follow-up urine culture shows no growth  Follow-up blood culture: Shows gram-positive cocci in pairs and chains/group A streptococcus in 1 of 2 cultures  Flu RSV COVID-negative  CT chest abdomen pelvis with no acute infectious findings  Switch antibiotics to cefazolin from ceftriaxone considering the rash could be allergy to ceftriaxone  2 L IV fluid bolus given in ED, continue with maintenance fluids.  Streptococcal bacteremia  Patient went 1/2 blood culture positive for Streptococcus pyogenes, unclear source of infection.  Per infectious disease recommending to continue with IV ceftriaxone  Recommended Doppler ultrasound to rule out septic thrombophlebitis    Paroxysmal atrial fibrillation (HCC) in RVR  /79   Pulse (!) 128   Temp (!) 100.6 °F (38.1 °C)   Resp 18   Ht 5' 4\" (1.626 m)   Wt 76 kg (167 lb 8.8 oz)   SpO2 94%   BMI 28.76 kg/m²   EKG shows rapid afib  Previously holding patient's home Toprol-XL/diltiazem due to hypotension- likely reason for rapid afib  Continues to be in rapid A-fib even after starting her on p.o. Cardizem 60 mg every 6 hours  Will start her on Cardizem drip, upgrade to stepdown to under Slim service, start Lopressor 25 mg every 8 hours and consult cardiology for further recommendations  Monitor on tele  Keep potassium close to 4 and mag 2  Continue Eliquis.  Muscle weakness (generalized)  Coming in with generalized weakness/poor oral intake/lightheadedness/chills  Patient also noted to be hypotensive in the ER with systolic " blood pressure readings in the 90s which is now improved  Patient receiving empiric IV fluids/IV antibiotics  Hold home antihypertensive agents.  PT OT eval requested  Hypothyroidism  Continue with Synthroid  Hyperlipidemia, mixed  Continue statin.  Diabetes mellitus, type 2 (HCC)  Lab Results   Component Value Date    HGBA1C 7.8 (H) 11/11/2024       Recent Labs     11/12/24  1104 11/12/24  1547 11/12/24  2106 11/13/24  0701   POCGLU 199* 240* 202* 174*       Blood Sugar Average: Last 72 hrs:  (P) 195.1780947781611000  Holding home Jardiance/Glyxambi  Patient placed on sliding scale coverage with ACHS checks  Consistent carb diet  Hypoglycemia protocol.  Stage III chronic kidney disease ([facility])  Lab Results   Component Value Date    EGFR 72 11/13/2024    EGFR 43 11/12/2024    EGFR 36 11/11/2024    CREATININE 0.79 11/13/2024    CREATININE 1.20 11/12/2024    CREATININE 1.39 (H) 11/11/2024     Stable renal function  Noted slightly elevation of creatinine from her baseline 1.1-1.2  Suspected in the setting of bacteremia.  Continue with IV fluids and reassess BMP in the morning.  Avoid nephrotoxic agents and hypoperfusion.  Venous stasis dermatitis of both lower extremities  Venous duplex does not show any DVT  Hypo-osmolality and hyponatremia  Check urine studies  Pt is on home diuretics and on hold for now due to low BP  Plan to resume if BP improves  Rash  Start benadyl cream  Avoid systemic benadryl for now unless symptoms not controlled to topical  Likely allergy based   Antibiotic switched to IV cefazolin from ceftriaxone    VTE Pharmacologic Prophylaxis: VTE Score: 5 High Risk (Score >/= 5) - Pharmacological DVT Prophylaxis Ordered: apixaban (Eliquis). Sequential Compression Devices Ordered.    Mobility:   Basic Mobility Inpatient Raw Score: 21  JH-HLM Goal: 6: Walk 10 steps or more  JH-HLM Achieved: 7: Walk 25 feet or more  JH-HLM Goal achieved. Continue to encourage appropriate mobility.    Patient Centered  Rounds: I performed bedside rounds with nursing staff today.   Discussions with Specialists or Other Care Team Provider: Cardiology, infectious disease    Education and Discussions with Family / Patient: Updated  (son) via phone. Detailed discussion on the current A&P above was held step by step and he understood everything. He will be visiting later he said    Current Length of Stay: 3 day(s)  Current Patient Status: Inpatient   Certification Statement: The patient will continue to require additional inpatient hospital stay due to ongoing A-fib with RVR and ongoing fever/infection needing IV antibiotics for treatment  Discharge Plan: Anticipate discharge in 48-72 hrs to home with home services.    Code Status: Level 1 - Full Code    Subjective   Patient seen and examined at bedside during a.m. rounds  Asymptomatic  No acute events reported overnight  No fever  Tele continues to show rapid afib for which pt started on cardizem gtt    Objective :  Temp:  [98.8 °F (37.1 °C)-101 °F (38.3 °C)] 100.6 °F (38.1 °C)  HR:  [116-139] 128  BP: (108-151)/(68-95) 124/79  Resp:  [15-18] 18  SpO2:  [80 %-98 %] 94 %  O2 Device: None (Room air)    Body mass index is 28.76 kg/m².     Input and Output Summary (last 24 hours):     Intake/Output Summary (Last 24 hours) at 11/13/2024 0919  Last data filed at 11/13/2024 0901  Gross per 24 hour   Intake 2440 ml   Output 1600 ml   Net 840 ml       Physical Exam  General- Awake, alert and oriented x 3, looks comfortable  HEENT- Normocephalic, atraumatic, oral mucosa- moist  Neck- Supple, No carotid bruit, no JVD  CVS- Normal S1/ S2, Irregularly irregular, No murmur, No edema  Respiratory system- B/L clear breath sounds, no wheezing  Abdomen- Soft, Non distended, no tenderness, Bowel sound- present 4 quads  Genitourinary- No suprapubic tenderness, No CVA tenderness  Skin- right LE shows a wound with periwound redness concerning for cellulitis  Musculoskeletal- No gross  deformity  Psych- No acute psychosis  CNS- CN II- XII grossly intact, No acute focal neurologic deficit noted      Lines/Drains:  peripheral IV      Telemetry:  Telemetry Orders (From admission, onward)               24 Hour Telemetry Monitoring  Continuous x 24 Hours (Telem)        Expiring   Question:  Reason for 24 Hour Telemetry  Answer:  Arrhythmias requiring acute medical intervention / PPM or ICD malfunction                     Telemetry Reviewed: Atrial fibrillation. HR averaging 130-140  Indication for Continued Telemetry Use: Arrthymias requiring medical therapy               Lab Results: I have reviewed the following results:   Results from last 7 days   Lab Units 11/13/24  0431   WBC Thousand/uL 10.65*   HEMOGLOBIN g/dL 9.1*   HEMATOCRIT % 28.2*   PLATELETS Thousands/uL 182   SEGS PCT % 89*   LYMPHO PCT % 5*   MONO PCT % 5   EOS PCT % 0     Results from last 7 days   Lab Units 11/13/24  0431 11/12/24  0510 11/11/24  0552   SODIUM mmol/L 135   < > 133*   POTASSIUM mmol/L 3.5   < > 3.5   CHLORIDE mmol/L 102   < > 100   CO2 mmol/L 20*   < > 21   BUN mg/dL 14   < > 23   CREATININE mg/dL 0.79   < > 1.39*   ANION GAP mmol/L 13   < > 12   CALCIUM mg/dL 6.3*   < > 7.8*   ALBUMIN g/dL  --   --  3.4*   TOTAL BILIRUBIN mg/dL  --   --  1.14*   ALK PHOS U/L  --   --  68   ALT U/L  --   --  16   AST U/L  --   --  25   GLUCOSE RANDOM mg/dL 134   < > 130    < > = values in this interval not displayed.         Results from last 7 days   Lab Units 11/13/24  0701 11/12/24  2106 11/12/24  1547 11/12/24  1104 11/12/24  0723 11/11/24  2137 11/11/24  1559 11/11/24  1103 11/11/24  0750 11/10/24  2051 11/10/24  1834   POC GLUCOSE mg/dl 174* 202* 240* 199* 160* 204* 205* 212* 134 253* 165*     Results from last 7 days   Lab Units 11/11/24  0552   HEMOGLOBIN A1C % 7.8*     Results from last 7 days   Lab Units 11/11/24  0552 11/10/24  1421 11/10/24  1208   LACTIC ACID mmol/L  --  0.7  --    PROCALCITONIN ng/ml 41.52*  --  3.70*        Recent Cultures (last 7 days):   Results from last 7 days   Lab Units 11/10/24  2101 11/10/24  1421 11/10/24  1415   BLOOD CULTURE   --  No Growth at 48 hrs. Streptococcus pyogenes (Group A)*   GRAM STAIN RESULT   --   --  Gram positive cocci in pairs and chains*   URINE CULTURE  No Growth <1000 cfu/mL  --   --        Imaging Results Review: I reviewed radiology reports from this admission including: Venous duplex.  Other Study Results Review: No additional pertinent studies reviewed.    Last 24 Hours Medication List:     Current Facility-Administered Medications:     acetaminophen (TYLENOL) tablet 650 mg, Q6H PRN    apixaban (ELIQUIS) tablet 5 mg, BID    ceFAZolin (ANCEF) IVPB (premix in dextrose) 2,000 mg 50 mL, Q8H, Last Rate: 2,000 mg (11/13/24 0353)    diltiazem (CARDIZEM) tablet 60 mg, Q6H JACKY    diphenhydrAMINE-zinc acetate (BENADRYL) 2-0.1 % cream, TID PRN    insulin lispro (HumALOG/ADMELOG) 100 units/mL subcutaneous injection 1-5 Units, TID AC **AND** Fingerstick Glucose (POCT), TID AC    levothyroxine tablet 125 mcg, Early Morning    multi-electrolyte (PLASMALYTE-A/ISOLYTE-S PH 7.4) IV solution, Continuous, Last Rate: 100 mL/hr (11/12/24 1446)    oxyCODONE (ROXICODONE) IR tablet 5 mg, Q6H PRN    pravastatin (PRAVACHOL) tablet 40 mg, Daily With Dinner    Administrative Statements   Today, Patient Was Seen By: Lex Dick MD  I have spent a total time of 55 minutes in caring for this patient on the day of the visit/encounter including Diagnostic results, Prognosis, Risks and benefits of tx options, Instructions for management, Patient and family education, Importance of tx compliance, Risk factor reductions, Impressions, Counseling / Coordination of care, Documenting in the medical record, Reviewing / ordering tests, medicine, procedures  , Obtaining or reviewing history  , and Communicating with other healthcare professionals .    **Please Note: This note may have been constructed using a voice  recognition system.**

## 2024-11-13 NOTE — PLAN OF CARE
Problem: PAIN - ADULT  Goal: Verbalizes/displays adequate comfort level or baseline comfort level  Description: Interventions:  - Encourage patient to monitor pain and request assistance  - Assess pain using appropriate pain scale  - Administer analgesics based on type and severity of pain and evaluate response  - Implement non-pharmacological measures as appropriate and evaluate response  - Consider cultural and social influences on pain and pain management  - Notify physician/advanced practitioner if interventions unsuccessful or patient reports new pain  Outcome: Progressing     Problem: INFECTION - ADULT  Goal: Absence or prevention of progression during hospitalization  Description: INTERVENTIONS:  - Assess and monitor for signs and symptoms of infection  - Monitor lab/diagnostic results  - Monitor all insertion sites, i.e. indwelling lines, tubes, and drains  - Monitor endotracheal if appropriate and nasal secretions for changes in amount and color  - [place] appropriate cooling/warming therapies per order  - Administer medications as ordered  - Instruct and encourage patient and family to use good hand hygiene technique  - Identify and instruct in appropriate isolation precautions for identified infection/condition  Outcome: Progressing  Goal: Absence of fever/infection during neutropenic period  Description: INTERVENTIONS:  - Monitor WBC    Outcome: Progressing     Problem: SAFETY ADULT  Goal: Patient will remain free of falls  Description: INTERVENTIONS:  - Educate patient/family on patient safety including physical limitations  - Instruct patient to call for assistance with activity   - Consult OT/PT to assist with strengthening/mobility   - Keep Call bell within reach  - Keep bed low and locked with side rails adjusted as appropriate  - Keep care items and personal belongings within reach  - Initiate and maintain comfort rounds  - Make Fall Risk Sign visible to staff  - Offer Toileting every 2 Hours,  in advance of need  - Initiate/Maintain alarm  - Obtain necessary fall risk management equipment  - Apply yellow socks and bracelet for high fall risk patients  - Consider moving patient to room near nurses station  Outcome: Progressing  Goal: Maintain or return to baseline ADL function  Description: INTERVENTIONS:  -  Assess patient's ability to carry out ADLs; assess patient's baseline for ADL function and identify physical deficits which impact ability to perform ADLs (bathing, care of mouth/teeth, toileting, grooming, dressing, etc.)  - Assess/evaluate cause of self-care deficits   - Assess range of motion  - Assess patient's mobility; develop plan if impaired  - Assess patient's need for assistive devices and provide as appropriate  - Encourage maximum independence but intervene and supervise when necessary  - Involve family in performance of ADLs  - Assess for home care needs following discharge   - Consider OT consult to assist with ADL evaluation and planning for discharge  - Provide patient education as appropriate  Outcome: Progressing  Goal: Maintains/Returns to pre admission functional level  Description: INTERVENTIONS:  - Perform AM-PAC 6 Click Basic Mobility/ Daily Activity assessment daily.  - Set and communicate daily mobility goal to care team and patient/family/caregiver.   - Collaborate with rehabilitation services on mobility goals if consulted  - Perform Range of Motion   - Reposition patient  - Dangle patient   - Stand patient   - Ambulate patient   - Out of bed to chair   - Out of bed for meals  - Out of bed for toileting  - Record patient progress and toleration of activity level   Outcome: Progressing     Problem: DISCHARGE PLANNING  Goal: Discharge to home or other facility with appropriate resources  Description: INTERVENTIONS:  - Identify barriers to discharge w/patient and caregiver  - Arrange for needed discharge resources and transportation as appropriate  - Identify discharge learning  needs (meds, wound care, etc.)  - Arrange for interpretive services to assist at discharge as needed  - Refer to Case Management Department for coordinating discharge planning if the patient needs post-hospital services based on physician/advanced practitioner order or complex needs related to functional status, cognitive ability, or social support system  Outcome: Progressing     Problem: Knowledge Deficit  Goal: Patient/family/caregiver demonstrates understanding of disease process, treatment plan, medications, and discharge instructions  Description: Complete learning assessment and assess knowledge base.  Interventions:  - Provide teaching at level of understanding  - Provide teaching via preferred learning methods  Outcome: Progressing

## 2024-11-13 NOTE — ASSESSMENT & PLAN NOTE
Lab Results   Component Value Date    HGBA1C 7.8 (H) 11/11/2024       Recent Labs     11/12/24  1104 11/12/24  1547 11/12/24  2106 11/13/24  0701   POCGLU 199* 240* 202* 174*       Blood Sugar Average: Last 72 hrs:  (P) 195.6641007047646104  Holding home Jardiance/Glyxambi  Patient placed on sliding scale coverage with ACHS checks  Consistent carb diet  Hypoglycemia protocol.

## 2024-11-13 NOTE — ASSESSMENT & PLAN NOTE
Lab Results   Component Value Date    EGFR 72 11/13/2024    EGFR 43 11/12/2024    EGFR 36 11/11/2024    CREATININE 0.79 11/13/2024    CREATININE 1.20 11/12/2024    CREATININE 1.39 (H) 11/11/2024   Management per primary team

## 2024-11-13 NOTE — PLAN OF CARE
Problem: PAIN - ADULT  Goal: Verbalizes/displays adequate comfort level or baseline comfort level  Description: Interventions:  - Encourage patient to monitor pain and request assistance  - Assess pain using appropriate pain scale  - Administer analgesics based on type and severity of pain and evaluate response  - Implement non-pharmacological measures as appropriate and evaluate response  - Consider cultural and social influences on pain and pain management  - Notify physician/advanced practitioner if interventions unsuccessful or patient reports new pain  Outcome: Progressing     Problem: INFECTION - ADULT  Goal: Absence or prevention of progression during hospitalization  Description: INTERVENTIONS:  - Assess and monitor for signs and symptoms of infection  - Monitor lab/diagnostic results  - Monitor all insertion sites, i.e. indwelling lines, tubes, and drains  - Administer medications as ordered  - Instruct and encourage patient and family to use good hand hygiene technique  - Identify and instruct in appropriate isolation precautions for identified infection/condition  Outcome: Progressing     Problem: CARDIOVASCULAR - ADULT  Goal: Maintains optimal cardiac output and hemodynamic stability  Description: INTERVENTIONS:  - Monitor I/O, vital signs and rhythm  - Monitor for S/S and trends of decreased cardiac output  - Administer and titrate ordered vasoactive medications to optimize hemodynamic stability  - Assess quality of pulses, skin color and temperature  - Assess for signs of decreased coronary artery perfusion  - Instruct patient to report change in severity of symptoms  Outcome: Progressing

## 2024-11-13 NOTE — ASSESSMENT & PLAN NOTE
Lab Results   Component Value Date    EGFR 72 11/13/2024    EGFR 43 11/12/2024    EGFR 36 11/11/2024    CREATININE 0.79 11/13/2024    CREATININE 1.20 11/12/2024    CREATININE 1.39 (H) 11/11/2024     Stable renal function  Noted slightly elevation of creatinine from her baseline 1.1-1.2  Suspected in the setting of bacteremia.  Continue with IV fluids and reassess BMP in the morning.  Avoid nephrotoxic agents and hypoperfusion.

## 2024-11-13 NOTE — ASSESSMENT & PLAN NOTE
"/79   Pulse (!) 128   Temp (!) 100.6 °F (38.1 °C)   Resp 18   Ht 5' 4\" (1.626 m)   Wt 76 kg (167 lb 8.8 oz)   SpO2 94%   BMI 28.76 kg/m²   EKG shows rapid afib  Previously holding patient's home Toprol-XL/diltiazem due to hypotension- likely reason for rapid afib  Continues to be in rapid A-fib even after starting her on p.o. Cardizem 60 mg every 6 hours  Will start her on Cardizem drip, upgrade to stepdown to under Slim service, start Lopressor 25 mg every 8 hours and consult cardiology for further recommendations  Monitor on tele  Keep potassium close to 4 and mag 2  Continue Eliquis.  "

## 2024-11-13 NOTE — ASSESSMENT & PLAN NOTE
Sepsis/bacteremia  Management per ID/primary team.  Suspected secondary to venous stasis dermatitis/lower extremity skin breaks.  On IV antibiotics.

## 2024-11-13 NOTE — ASSESSMENT & PLAN NOTE
Lab Results   Component Value Date    HGBA1C 7.8 (H) 11/11/2024       Recent Labs     11/12/24  2106 11/13/24  0701 11/13/24  1117 11/13/24  1435   POCGLU 202* 174* 239* 303*       Blood Sugar Average: Last 72 hrs:  (P) 206.2110154827926763

## 2024-11-13 NOTE — ASSESSMENT & PLAN NOTE
Longstanding persistent atrial fibrillation with RVR  She remains in atrial fibrillation with RVR on telemetry.  Likely secondary to holding home AV armond blocking agents in the setting of soft BPs and sepsis.  Agree with initiation of Cardizem drip and Lopressor 25 mg every 8 hours  Continue anticoagulation with Eliquis.  [id number].   Continue telemetry

## 2024-11-14 LAB
ANION GAP SERPL CALCULATED.3IONS-SCNC: 7 MMOL/L (ref 4–13)
BASOPHILS # BLD AUTO: 0.01 THOUSANDS/ÂΜL (ref 0–0.1)
BASOPHILS NFR BLD AUTO: 0 % (ref 0–1)
BUN SERPL-MCNC: 11 MG/DL (ref 5–25)
CALCIUM SERPL-MCNC: 7.5 MG/DL (ref 8.4–10.2)
CHLORIDE SERPL-SCNC: 103 MMOL/L (ref 96–108)
CO2 SERPL-SCNC: 23 MMOL/L (ref 21–32)
CREAT SERPL-MCNC: 0.88 MG/DL (ref 0.6–1.3)
EOSINOPHIL # BLD AUTO: 0.02 THOUSAND/ÂΜL (ref 0–0.61)
EOSINOPHIL NFR BLD AUTO: 0 % (ref 0–6)
ERYTHROCYTE [DISTWIDTH] IN BLOOD BY AUTOMATED COUNT: 15.1 % (ref 11.6–15.1)
FERRITIN SERPL-MCNC: 120 NG/ML (ref 11–307)
GFR SERPL CREATININE-BSD FRML MDRD: 63 ML/MIN/1.73SQ M
GLUCOSE SERPL-MCNC: 152 MG/DL (ref 65–140)
GLUCOSE SERPL-MCNC: 172 MG/DL (ref 65–140)
GLUCOSE SERPL-MCNC: 182 MG/DL (ref 65–140)
GLUCOSE SERPL-MCNC: 185 MG/DL (ref 65–140)
GLUCOSE SERPL-MCNC: 197 MG/DL (ref 65–140)
HCT VFR BLD AUTO: 29.3 % (ref 34.8–46.1)
HGB BLD-MCNC: 9.3 G/DL (ref 11.5–15.4)
IMM GRANULOCYTES # BLD AUTO: 0.11 THOUSAND/UL (ref 0–0.2)
IMM GRANULOCYTES NFR BLD AUTO: 1 % (ref 0–2)
IRON SERPL-MCNC: <10 UG/DL (ref 50–212)
LYMPHOCYTES # BLD AUTO: 0.77 THOUSANDS/ÂΜL (ref 0.6–4.47)
LYMPHOCYTES NFR BLD AUTO: 6 % (ref 14–44)
MAGNESIUM SERPL-MCNC: 1.9 MG/DL (ref 1.9–2.7)
MCH RBC QN AUTO: 26.6 PG (ref 26.8–34.3)
MCHC RBC AUTO-ENTMCNC: 31.7 G/DL (ref 31.4–37.4)
MCV RBC AUTO: 84 FL (ref 82–98)
MONOCYTES # BLD AUTO: 0.75 THOUSAND/ÂΜL (ref 0.17–1.22)
MONOCYTES NFR BLD AUTO: 6 % (ref 4–12)
NEUTROPHILS # BLD AUTO: 10.57 THOUSANDS/ÂΜL (ref 1.85–7.62)
NEUTS SEG NFR BLD AUTO: 87 % (ref 43–75)
NRBC BLD AUTO-RTO: 0 /100 WBCS
PLATELET # BLD AUTO: 200 THOUSANDS/UL (ref 149–390)
PMV BLD AUTO: 9.7 FL (ref 8.9–12.7)
POTASSIUM SERPL-SCNC: 3.2 MMOL/L (ref 3.5–5.3)
PROCALCITONIN SERPL-MCNC: 8.12 NG/ML
RBC # BLD AUTO: 3.5 MILLION/UL (ref 3.81–5.12)
SODIUM SERPL-SCNC: 133 MMOL/L (ref 135–147)
UIBC SERPL-MCNC: 252 UG/DL (ref 155–355)
WBC # BLD AUTO: 12.23 THOUSAND/UL (ref 4.31–10.16)

## 2024-11-14 PROCEDURE — 83540 ASSAY OF IRON: CPT | Performed by: FAMILY MEDICINE

## 2024-11-14 PROCEDURE — 80048 BASIC METABOLIC PNL TOTAL CA: CPT | Performed by: FAMILY MEDICINE

## 2024-11-14 PROCEDURE — 83550 IRON BINDING TEST: CPT | Performed by: FAMILY MEDICINE

## 2024-11-14 PROCEDURE — 82948 REAGENT STRIP/BLOOD GLUCOSE: CPT

## 2024-11-14 PROCEDURE — 84145 PROCALCITONIN (PCT): CPT | Performed by: FAMILY MEDICINE

## 2024-11-14 PROCEDURE — 82728 ASSAY OF FERRITIN: CPT | Performed by: FAMILY MEDICINE

## 2024-11-14 PROCEDURE — 85025 COMPLETE CBC W/AUTO DIFF WBC: CPT | Performed by: FAMILY MEDICINE

## 2024-11-14 PROCEDURE — 99233 SBSQ HOSP IP/OBS HIGH 50: CPT | Performed by: FAMILY MEDICINE

## 2024-11-14 PROCEDURE — 99233 SBSQ HOSP IP/OBS HIGH 50: CPT | Performed by: INTERNAL MEDICINE

## 2024-11-14 PROCEDURE — 83735 ASSAY OF MAGNESIUM: CPT | Performed by: FAMILY MEDICINE

## 2024-11-14 PROCEDURE — 99232 SBSQ HOSP IP/OBS MODERATE 35: CPT | Performed by: INTERNAL MEDICINE

## 2024-11-14 RX ORDER — FUROSEMIDE 20 MG/1
20 TABLET ORAL DAILY
Status: DISCONTINUED | OUTPATIENT
Start: 2024-11-14 | End: 2024-11-17

## 2024-11-14 RX ORDER — POTASSIUM CHLORIDE 14.9 MG/ML
20 INJECTION INTRAVENOUS ONCE
Status: COMPLETED | OUTPATIENT
Start: 2024-11-14 | End: 2024-11-14

## 2024-11-14 RX ORDER — POTASSIUM CHLORIDE 1500 MG/1
40 TABLET, EXTENDED RELEASE ORAL ONCE
Status: COMPLETED | OUTPATIENT
Start: 2024-11-14 | End: 2024-11-14

## 2024-11-14 RX ADMIN — DILTIAZEM HYDROCHLORIDE 15 MG/HR: 5 INJECTION INTRAVENOUS at 02:05

## 2024-11-14 RX ADMIN — CEFAZOLIN SODIUM 2000 MG: 2 SOLUTION INTRAVENOUS at 01:53

## 2024-11-14 RX ADMIN — POTASSIUM CHLORIDE 40 MEQ: 1500 TABLET, EXTENDED RELEASE ORAL at 09:30

## 2024-11-14 RX ADMIN — CEFAZOLIN SODIUM 2000 MG: 2 SOLUTION INTRAVENOUS at 11:23

## 2024-11-14 RX ADMIN — INSULIN LISPRO 1 UNITS: 100 INJECTION, SOLUTION INTRAVENOUS; SUBCUTANEOUS at 16:53

## 2024-11-14 RX ADMIN — INSULIN LISPRO 1 UNITS: 100 INJECTION, SOLUTION INTRAVENOUS; SUBCUTANEOUS at 11:20

## 2024-11-14 RX ADMIN — DILTIAZEM HYDROCHLORIDE 10 MG/HR: 5 INJECTION INTRAVENOUS at 14:17

## 2024-11-14 RX ADMIN — APIXABAN 5 MG: 5 TABLET, FILM COATED ORAL at 08:16

## 2024-11-14 RX ADMIN — CEFAZOLIN SODIUM 2000 MG: 2 SOLUTION INTRAVENOUS at 18:00

## 2024-11-14 RX ADMIN — INSULIN LISPRO 1 UNITS: 100 INJECTION, SOLUTION INTRAVENOUS; SUBCUTANEOUS at 07:32

## 2024-11-14 RX ADMIN — APIXABAN 5 MG: 5 TABLET, FILM COATED ORAL at 18:00

## 2024-11-14 RX ADMIN — METOPROLOL TARTRATE 25 MG: 25 TABLET, FILM COATED ORAL at 09:09

## 2024-11-14 RX ADMIN — METOPROLOL TARTRATE 25 MG: 25 TABLET, FILM COATED ORAL at 16:49

## 2024-11-14 RX ADMIN — METOPROLOL TARTRATE 25 MG: 25 TABLET, FILM COATED ORAL at 01:53

## 2024-11-14 RX ADMIN — FUROSEMIDE 20 MG: 20 TABLET ORAL at 14:29

## 2024-11-14 RX ADMIN — PRAVASTATIN SODIUM 40 MG: 40 TABLET ORAL at 16:49

## 2024-11-14 RX ADMIN — LEVOTHYROXINE SODIUM 125 MCG: 125 TABLET ORAL at 06:10

## 2024-11-14 RX ADMIN — POTASSIUM CHLORIDE 20 MEQ: 14.9 INJECTION, SOLUTION INTRAVENOUS at 09:30

## 2024-11-14 NOTE — ASSESSMENT & PLAN NOTE
Lab Results   Component Value Date    EGFR 63 11/14/2024    EGFR 72 11/13/2024    EGFR 43 11/12/2024    CREATININE 0.88 11/14/2024    CREATININE 0.79 11/13/2024    CREATININE 1.20 11/12/2024   Estimated Creatinine Clearance: 53.3 mL/min (by C-G formula based on SCr of 0.88 mg/dL).   - No adjustment needed for cefazolin or cephalexin at this CrCl  - Dose adjust other medications

## 2024-11-14 NOTE — ASSESSMENT & PLAN NOTE
Lab Results   Component Value Date    HGBA1C 7.8 (H) 11/11/2024     Recent Labs     11/13/24  1435 11/13/24  1730 11/13/24 2026 11/14/24  0731   POCGLU 303* 245* 304* 172*   This is a risk factor for infection.    - Maintain euglycemia to improve WBC function and improve wound healing   - Needs regular Podiatry follow up for foot care

## 2024-11-14 NOTE — ASSESSMENT & PLAN NOTE
Improving  Blood culture: GAS  ID following  Currently on cefazolin, was on ceftriaxone but developed rash with it and improved with cefazolin  Procal downtrending  Leukocytosis could be reactive to RVR, will continue to monitor

## 2024-11-14 NOTE — ASSESSMENT & PLAN NOTE
Lab Results   Component Value Date    EGFR 63 11/14/2024    EGFR 72 11/13/2024    EGFR 43 11/12/2024    CREATININE 0.88 11/14/2024    CREATININE 0.79 11/13/2024    CREATININE 1.20 11/12/2024   Management per primary team

## 2024-11-14 NOTE — ASSESSMENT & PLAN NOTE
Lab Results   Component Value Date    EGFR 63 11/14/2024    EGFR 72 11/13/2024    EGFR 43 11/12/2024    CREATININE 0.88 11/14/2024    CREATININE 0.79 11/13/2024    CREATININE 1.20 11/12/2024     Stable renal function  Noted slightly elevation of creatinine from her baseline 1.1-1.2  Suspected in the setting of bacteremia.  Continue with IV fluids and reassess BMP in the morning.  Avoid nephrotoxic agents and hypoperfusion.

## 2024-11-14 NOTE — ASSESSMENT & PLAN NOTE
Lab Results   Component Value Date    HGBA1C 7.8 (H) 11/11/2024       Recent Labs     11/13/24  1435 11/13/24  1730 11/13/24 2026 11/14/24  0731   POCGLU 303* 245* 304* 172*       Blood Sugar Average: Last 72 hrs:  (P) 213.4944615791843883  Holding home Jardiance/Glyxambi  Patient placed on sliding scale coverage with ACHS checks  Consistent carb diet  Hypoglycemia protocol.

## 2024-11-14 NOTE — ASSESSMENT & PLAN NOTE
Check iron panel   Has hemorrhoids, and does bleed off and on which can lead to chronic iron deficiency

## 2024-11-14 NOTE — PROGRESS NOTES
"Progress Note - Hospitalist   Name: Jenny Galarza 79 y.o. female I MRN: [id number]  Unit/Bed#: ICU 07 I Date of Admission: 11/10/2024   Date of Service: 11/14/2024 I Hospital Day: 4    Assessment & Plan  Sepsis (HCC)  Improving  Blood culture: GAS  ID following  Currently on cefazolin, was on ceftriaxone but developed rash with it and improved with cefazolin  Procal downtrending  Leukocytosis could be reactive to RVR, will continue to monitor  Streptococcal bacteremia  Patient went 1/2 blood culture positive for Streptococcus pyogenes, unclear source of infection.  Per infectious disease recommending to continue with IV ceftriaxone  Recommended Doppler ultrasound to rule out septic thrombophlebitis    Paroxysmal atrial fibrillation (HCC) in RVR  /60   Pulse (!) 110   Temp 99.2 °F (37.3 °C) (Oral)   Resp (!) 28   Ht 5' 4\" (1.626 m)   Wt 75.8 kg (167 lb)   SpO2 94%   BMI 28.67 kg/m²   EKG shows rapid afib  Previously holding patient's home Toprol-XL/diltiazem due to hypotension- likely reason for rapid afib  Continued to be in rapid A-fib even after starting her on p.o. Cardizem 60 mg every 6 hours  Continue Cardizem drip until heart rate consistently below 90 on SD2  continue Lopressor 25 mg every 8 hours and follow cardiology recommendations  Monitor on tele  Keep potassium close to 4 and mag 2  Continue Eliquis.  Muscle weakness (generalized)  Coming in with generalized weakness/poor oral intake/lightheadedness/chills  Patient also noted to be hypotensive in the ER with systolic blood pressure readings in the 90s which is now improved  Patient receiving empiric IV fluids/IV antibiotics  Hold home antihypertensive agents.  PT OT eval requested  Hypothyroidism  Continue with Synthroid  Hyperlipidemia, mixed  Continue statin.  Diabetes mellitus, type 2 (HCC)  Lab Results   Component Value Date    HGBA1C 7.8 (H) 11/11/2024       Recent Labs     11/13/24  1435 11/13/24  1730 11/13/24 2026 11/14/24  0731 " "  POCGLU 303* 245* 304* 172*       Blood Sugar Average: Last 72 hrs:  (P) 213.8627938185212254  Holding home Jardiance/Glyxambi  Patient placed on sliding scale coverage with ACHS checks  Consistent carb diet  Hypoglycemia protocol.  Stage III chronic kidney disease (HCC)  Lab Results   Component Value Date    EGFR 63 11/14/2024    EGFR 72 11/13/2024    EGFR 43 11/12/2024    CREATININE 0.88 11/14/2024    CREATININE 0.79 11/13/2024    CREATININE 1.20 11/12/2024     Stable renal function  Noted slightly elevation of creatinine from her baseline 1.1-1.2  Suspected in the setting of bacteremia.  Continue with IV fluids and reassess BMP in the morning.  Avoid nephrotoxic agents and hypoperfusion.  Venous stasis dermatitis of both lower extremities  Venous duplex does not show any DVT  Hypo-osmolality and hyponatremia  Check urine studies  Pt is on home diuretics and on hold for now due to low BP  Plan to resume if BP improves  Rash  Start benadyl cream  Avoid systemic benadryl for now unless symptoms not controlled to topical  Likely allergy based   Antibiotic switched to IV cefazolin from ceftriaxone  Primary hypertension  /71   Pulse 101   Temp 99 °F (37.2 °C) (Oral)   Resp (!) 23   Ht 5' 4\" (1.626 m)   Wt 75.8 kg (167 lb)   SpO2 95%   BMI 28.67 kg/m²   Stable pressures  Anemia  Check iron panel   Has hemorrhoids, and does bleed off and on which can lead to chronic iron deficiency    Persistent atrial fibrillation with RVR (HCC)  As above  Dyslipidemia      VTE Pharmacologic Prophylaxis: VTE Score: 5 High Risk (Score >/= 5) - Pharmacological DVT Prophylaxis Ordered: apixaban (Eliquis). Sequential Compression Devices Ordered.    Mobility:   Basic Mobility Inpatient Raw Score: 18  JH-HLM Goal: 6: Walk 10 steps or more  JH-HLM Achieved: 2: Bed activities/Dependent transfer  JH-HLM Goal NOT achieved. Continue with multidisciplinary rounding and encourage appropriate mobility to improve upon JH-HLM " goals.    Patient Centered Rounds: I performed bedside rounds with nursing staff today.   Discussions with Specialists or Other Care Team Provider: ID, cardiology    Education and Discussions with Family / Patient:  pts son has been updated.     Current Length of Stay: 4 day(s)  Current Patient Status: Inpatient   Certification Statement: The patient will continue to require additional inpatient hospital stay due to ongoing treatment of afib rvr and sepsis  Discharge Plan: Anticipate discharge in 48 hrs to home.    Code Status: Level 1 - Full Code    Subjective   Pt seen and examined at bedside during AM rounds  No new complaints  Feels much better  No acute overnight events reported    Objective :  Temp:  [98.9 °F (37.2 °C)-99.8 °F (37.7 °C)] 99.2 °F (37.3 °C)  HR:  [] 110  BP: (109-140)/() 117/60  Resp:  [15-44] 28  SpO2:  [88 %-98 %] 94 %  O2 Device: Nasal cannula  Nasal Cannula O2 Flow Rate (L/min):  [2 L/min] 2 L/min    Body mass index is 28.67 kg/m².     Input and Output Summary (last 24 hours):     Intake/Output Summary (Last 24 hours) at 11/14/2024 0905  Last data filed at 11/14/2024 0800  Gross per 24 hour   Intake 3822.16 ml   Output 735 ml   Net 3087.16 ml       Physical Exam  General- Awake, alert and oriented x 3, looks comfortable  HEENT- Normocephalic, atraumatic, oral mucosa- moist  Neck- Supple, No carotid bruit, no JVD  CVS- Normal S1/ S2, irregularly irregular, no murmur, No edema  Respiratory system- B/L clear breath sounds, no wheezing  Abdomen- Soft, Non distended, no tenderness, Bowel sound- present 4 quads  Genitourinary- No suprapubic tenderness, No CVA tenderness  Skin-lower extremity skin continues to be red but the erythema has improved with antibiotics  Musculoskeletal- No gross deformity  Psych- No acute psychosis  CNS- CN II- XII grossly intact, No acute focal neurologic deficit noted      Lines/Drains:        Telemetry:  Telemetry Orders (From admission, onward)                24 Hour Telemetry Monitoring  Continuous x 24 Hours (Telem)        Question:  Reason for 24 Hour Telemetry  Answer:  Arrhythmias requiring acute medical intervention / PPM or ICD malfunction                     Telemetry Reviewed: Atrial fibrillation. HR averaging 110-120  Indication for Continued Telemetry Use: Arrthymias requiring medical therapy               Lab Results: I have reviewed the following results:   Results from last 7 days   Lab Units 11/14/24  0609   WBC Thousand/uL 12.23*   HEMOGLOBIN g/dL 9.3*   HEMATOCRIT % 29.3*   PLATELETS Thousands/uL 200   SEGS PCT % 87*   LYMPHO PCT % 6*   MONO PCT % 6   EOS PCT % 0     Results from last 7 days   Lab Units 11/14/24  0609 11/12/24  0510 11/11/24  0552   SODIUM mmol/L 133*   < > 133*   POTASSIUM mmol/L 3.2*   < > 3.5   CHLORIDE mmol/L 103   < > 100   CO2 mmol/L 23   < > 21   BUN mg/dL 11   < > 23   CREATININE mg/dL 0.88   < > 1.39*   ANION GAP mmol/L 7   < > 12   CALCIUM mg/dL 7.5*   < > 7.8*   ALBUMIN g/dL  --   --  3.4*   TOTAL BILIRUBIN mg/dL  --   --  1.14*   ALK PHOS U/L  --   --  68   ALT U/L  --   --  16   AST U/L  --   --  25   GLUCOSE RANDOM mg/dL 182*   < > 130    < > = values in this interval not displayed.         Results from last 7 days   Lab Units 11/14/24  0731 11/13/24  2026 11/13/24  1730 11/13/24  1435 11/13/24  1117 11/13/24  0701 11/12/24  2106 11/12/24  1547 11/12/24  1104 11/12/24  0723 11/11/24  2137 11/11/24  1559   POC GLUCOSE mg/dl 172* 304* 245* 303* 239* 174* 202* 240* 199* 160* 204* 205*     Results from last 7 days   Lab Units 11/11/24  0552   HEMOGLOBIN A1C % 7.8*     Results from last 7 days   Lab Units 11/14/24  0609 11/13/24  0431 11/11/24  0552 11/10/24  1421 11/10/24  1208   LACTIC ACID mmol/L  --   --   --  0.7  --    PROCALCITONIN ng/ml 8.12* 12.52* 41.52*  --  3.70*       Recent Cultures (last 7 days):   Results from last 7 days   Lab Units 11/10/24  2101 11/10/24  1421 11/10/24  1415   BLOOD CULTURE   --  No Growth  at 72 hrs. Streptococcus pyogenes (Group A)*   GRAM STAIN RESULT   --   --  Gram positive cocci in pairs and chains*   URINE CULTURE  No Growth <1000 cfu/mL  --   --        Imaging Results Review: No pertinent imaging studies reviewed.  Other Study Results Review: No additional pertinent studies reviewed.    Last 24 Hours Medication List:     Current Facility-Administered Medications:     acetaminophen (TYLENOL) tablet 650 mg, Q6H PRN    apixaban (ELIQUIS) tablet 5 mg, BID    ceFAZolin (ANCEF) IVPB (premix in dextrose) 2,000 mg 50 mL, Q8H, Last Rate: 2,000 mg (11/14/24 0153)    diltiazem (CARDIZEM) 125 mg in sodium chloride 0.9 % 125 mL infusion, Titrated, Last Rate: 5 mg/hr (11/14/24 0600)    diphenhydrAMINE-zinc acetate (BENADRYL) 2-0.1 % cream, TID PRN    insulin lispro (HumALOG/ADMELOG) 100 units/mL subcutaneous injection 1-5 Units, TID AC **AND** Fingerstick Glucose (POCT), TID AC    levothyroxine tablet 125 mcg, Early Morning    metoprolol tartrate (LOPRESSOR) tablet 25 mg, Q8H    multi-electrolyte (PLASMALYTE-A/ISOLYTE-S PH 7.4) IV solution, Continuous, Last Rate: 50 mL/hr (11/14/24 0610)    oxyCODONE (ROXICODONE) IR tablet 5 mg, Q6H PRN    potassium chloride (Klor-Con M20) CR tablet 40 mEq, Once    potassium chloride 20 mEq IVPB (premix), Once    pravastatin (PRAVACHOL) tablet 40 mg, Daily With Dinner    Administrative Statements   Today, Patient Was Seen By: Lxe Dick MD  I have spent a total time of 55 minutes in caring for this patient on the day of the visit/encounter including Diagnostic results, Prognosis, Risks and benefits of tx options, Instructions for management, Patient and family education, Importance of tx compliance, Risk factor reductions, Impressions, Counseling / Coordination of care, Documenting in the medical record, Reviewing / ordering tests, medicine, procedures  , Obtaining or reviewing history  , and Communicating with other healthcare professionals .    **Please Note: This note  may have been constructed using a voice recognition system.**

## 2024-11-14 NOTE — ASSESSMENT & PLAN NOTE
Likely source of bacteremia.  No overt signs of cellulitis, but she does have tenderness and indurated skin, as well as multiple areas of dry cracked skin.  There is weeping superficial wound on the left leg.  Long history of venous insufficiency.  No DVT's.  - Antibiotics as above  - Needs risk factor reduction: [place] use of emollients to dry cracked skin, aggressive leg elevation, compression or Ace wrap as tolerated

## 2024-11-14 NOTE — CASE MANAGEMENT
Case Management Discharge Planning Note    Patient name Jenny Galarza  Location ICU 07/ICU 07 MRN [id number]  : 1945 Date 2024       Current Admission Date: 11/10/2024  Current Admission Diagnosis:Sepsis (HCC)   Patient Active Problem List    Diagnosis Date Noted Date Diagnosed    Persistent atrial fibrillation with RVR (HCC) 2024     Hypo-osmolality and hyponatremia 2024     Rash 2024     Streptococcal bacteremia 2024     Venous stasis dermatitis of both lower extremities 2024     Sepsis (HCC) 11/10/2024     Muscle weakness (generalized) 11/10/2024     Intractable nausea and vomiting 2019     Chronic kidney disease-mineral and bone disorder 10/07/2019     Benign essential hypertension 2019     Hyperkalemia 2017     Gout 2016     Stage III chronic kidney disease (HCC) 2016     Paroxysmal atrial fibrillation (HCC) in RVR 2016     Hypothyroidism 2014     Primary hypertension 2014     Hyperlipidemia, mixed 10/13/2014     Diabetes mellitus, type 2 (HCC) 10/13/2014     Anemia 10/13/2014       LOS (days): 4  Geometric Mean LOS (GMLOS) (days): 3.5  Days to GMLOS:-0.5     OBJECTIVE:  Risk of Unplanned Readmission Score: 15.15         Current admission status: Inpatient   Preferred Pharmacy:   [id number] - [place] - [address]  [address]  [facility] [address]  Phone: [phone] Fax: [phone]    Primary Care Provider: Kathya Irene DO    Primary Insurance: [employer] REP  Secondary Insurance:     DISCHARGE DETAILS:                                          Other Referral/Resources/Interventions Provided:  Referral Comments: Pt to ICU on  for persistent atrial fibrillation with RVR likely precipitated by sepsis + holding her home AV armond blocking agents due to hypotension.  On O2 2L, IV abx, IV KCl, IV cardizem, IVF at 50.  Per SLIM rounds this AM, d/c anticipated in 48 hrs.   CM to s/w pt re: Level III recommendations made by therapy.         Treatment Team Recommendation: Home with Home Health Care, Outpatient Rehab  Discharge Destination Plan:: Outpatient Rehab, Home with Home Health Care  Transport at Discharge : Family

## 2024-11-14 NOTE — PROGRESS NOTES
Progress Note - Infectious Disease   Name: Jenny Galarza 79 y.o. female I MRN: [id number]  Unit/Bed#: ICU 07 I Date of Admission: 11/10/2024   Date of Service: 11/14/2024 I Hospital Day: 4     Assessment & Plan  Sepsis (HCC)  As evidenced by fever and leukocytosis.  Due to group A strep bacteremia.  Improving on appropriate antibiotics.  - Continue antibiotics and further management as below  - Trend fever, WBC count, vitals  - Additional supportive care per primary team  Streptococcal bacteremia  Blood cultures on 11/10 isolated group A Streptococcus.  Likely source is lower extremity stasis dermatitis/skin breaks.  - Continue cefazolin 2 g every 8 hours  - Follow BMP and CBCD to monitor for medication toxicities and treatment response  - Anticipate total 14-day antibiotic course, can finish with oral antibiotics when more ready for discharge (use cephalexin 1 g every 6 hours through 11/23/24)    Rash  Appears consistent with drug reaction.  Pruritic but no hives.  See images in media 11/12/24.  History of beta-lactam allergies in the past.  Appears to be tolerating cefazolin without extension of rash or worsening symptoms)  - Added ceftriaxone to allergy list, but this is a non-severe allergy and other cephalosporins may be tolerated  Venous stasis dermatitis of both lower extremities  Likely source of bacteremia.  No overt signs of cellulitis, but she does have tenderness and indurated skin, as well as multiple areas of dry cracked skin.  There is weeping superficial wound on the left leg.  Long history of venous insufficiency.  No DVT's.  - Antibiotics as above  - Needs risk factor reduction: [place] use of emollients to dry cracked skin, aggressive leg elevation, compression or Ace wrap as tolerated  Diabetes mellitus, type 2 (HCC)  Lab Results   Component Value Date    HGBA1C 7.8 (H) 11/11/2024     Recent Labs     11/13/24  1435 11/13/24  1730 11/13/24 2026 11/14/24  0731   POCGLU 303* 245* 304* 172*    This is a risk factor for infection.    - Maintain euglycemia to improve WBC function and improve wound healing   - Needs regular Podiatry follow up for foot care  Paroxysmal atrial fibrillation (HCC) in RVR  Some episodes of uncontrolled rate likely in the setting of infection.  Should continue to improve as infection is treated.    - Management otherwise per primary team  Stage III chronic kidney disease (HCC)  Lab Results   Component Value Date    EGFR 63 2024    EGFR 72 2024    EGFR 43 2024    CREATININE 0.88 2024    CREATININE 0.79 2024    CREATININE 1.20 2024   Estimated Creatinine Clearance: 53.3 mL/min (by C-G formula based on SCr of 0.88 mg/dL).   - No adjustment needed for cefazolin or cephalexin at this CrCl  - Dose adjust other medications       I have discussed the above management plan in detail with the primary service, Dr. Dick, who agrees with ID care plan above.    ID consult service will continue to follow along with you.    I have performed an extensive review of the medical records in Epic including review of the notes, radiographs, and laboratory results.    Antibiotics:  Total antibiotics day 5  Cefazolin day 3    24 Hour Events:  She was transferred to the ICU for persistent A-fib with RVR.    Subjective:  She reports feeling well.  Rates better controlled.  No fevers/chills, legs are feeling better.  Denies extension/progression of rash.  No longer having pruritus.  No mucus membrane involvement, no swelling.    Objective:  Vitals:  Temp:  [98.9 °F (37.2 °C)-99.8 °F (37.7 °C)] 99.2 °F (37.3 °C)  HR:  [] 102  Resp:  [15-44] 24  BP: (109-140)/() 118/79  SpO2:  [88 %-98 %] 93 %  Temp (24hrs), Av.3 °F (37.4 °C), Min:98.9 °F (37.2 °C), Max:99.8 °F (37.7 °C)  Current: Temperature: 99.2 °F (37.3 °C)    Physical Exam:   General:  Well developed, no acute distress  HEENT:  Normocephalic, sclera anicteric, MMM  Lungs:  Unlabored respirations    Abdomen:  Soft, nontender, nondistended, normal bowel sounds  Extremities:  Chronic woody edema bilaterally   Skin:  Erythematous macular rash over chest, back, arms  Neuro:  Awake, alert, interactive        Labs:   All pertinent labs and imaging studies were personally reviewed  Results from last 7 days   Lab Units 11/14/24  0609 11/13/24 0431 11/12/24  0510   WBC Thousand/uL 12.23* 10.65* 11.66*   HEMOGLOBIN g/dL 9.3* 9.1* 9.9*   PLATELETS Thousands/uL 200 182 178     Results from last 7 days   Lab Units 11/14/24  0609 11/13/24 0431 11/12/24  0510 11/11/24  0552 11/10/24  1208   SODIUM mmol/L 133* 135 131* 133* 131*   POTASSIUM mmol/L 3.2* 3.5 3.1* 3.5 3.4*   CHLORIDE mmol/L 103 102 100 100 96   CO2 mmol/L 23 20* 22 21 24   BUN mg/dL 11 14 26* 23 26*   CREATININE mg/dL 0.88 0.79 1.20 1.39* 1.30   EGFR ml/min/1.73sq m 63 72 43 36 39   CALCIUM mg/dL 7.5* 6.3* 7.4* 7.8* 8.8   AST U/L  --   --   --  25 19   ALT U/L  --   --   --  16 13   ALK PHOS U/L  --   --   --  68 75     Results from last 7 days   Lab Units 11/14/24  0609 11/13/24 0431 11/11/24  0552 11/10/24  1208   PROCALCITONIN ng/ml 8.12* 12.52* 41.52* 3.70*                   Microbiology:  Results from last 7 days   Lab Units 11/10/24  2101 11/10/24  1421 11/10/24  1415   BLOOD CULTURE   --  No Growth at 72 hrs. Streptococcus pyogenes (Group A)*   GRAM STAIN RESULT   --   --  Gram positive cocci in pairs and chains*   URINE CULTURE  No Growth <1000 cfu/mL  --   --        Imaging:  No new imaging.      Pete Lion MD  [facility]

## 2024-11-14 NOTE — ASSESSMENT & PLAN NOTE
Longstanding persistent atrial fibrillation with RVR  She remains in atrial fibrillation adequate rate control on telemetry.  Likely secondary to holding home AV armond blocking agents in the setting of soft BPs and sepsis/bacteremia.  Continue Cardizem gtt. and Lopressor 25 mg every 8 hours.    Continue anticoagulation with Eliquis.  [id number] 5.   Continue telemetry

## 2024-11-14 NOTE — PROGRESS NOTES
Cardiology Progress Note - Jenny Galarza 79 y.o. female MRN: [id number]    Unit/Bed#: ICU 07 Encounter: [id number]      Assessment/Plan:   Assessment & Plan  Streptococcal bacteremia  Sepsis/bacteremia  Management per ID/primary team.  Suspected secondary to venous stasis dermatitis/lower extremity skin breaks.  On IV antibiotics.    Persistent atrial fibrillation with RVR (HCC)  Longstanding persistent atrial fibrillation with RVR  She remains in atrial fibrillation adequate rate control on telemetry.  Likely secondary to holding home AV armond blocking agents in the setting of soft BPs and sepsis/bacteremia.  Continue Cardizem gtt. and Lopressor 25 mg every 8 hours.    Continue anticoagulation with Eliquis.  [id number] 5.   Continue telemetry    Anemia  Management per primary team    Venous stasis dermatitis of both lower extremities  Management per primary team/ID    Primary hypertension  Continue Lasix, Lopressor, Cardizem drip per above.  Continue to monitor BPs closely.    Hyperlipidemia, mixed  Continue statin    Stage III chronic kidney disease ([facility])  Lab Results   Component Value Date    EGFR 63 11/14/2024    EGFR 72 11/13/2024    EGFR 43 11/12/2024    CREATININE 0.88 11/14/2024    CREATININE 0.79 11/13/2024    CREATININE 1.20 11/12/2024   Management per primary team    Diabetes mellitus, type 2 ([facility])  Lab Results   Component Value Date    HGBA1C 7.8 (H) 11/11/2024       Recent Labs     11/13/24  1435 11/13/24  1730 11/13/24  2026 11/14/24  0731   POCGLU 303* 245* 304* 172*       Blood Sugar Average: Last 72 hrs:  (P) 213.4638304770439135  Management per primary team    Hypo-osmolality and hyponatremia  Management per primary team        Subjective:   Patient seen and examined.  She reports she is feeling better compared to yesterday and offers no specific cardiac concerns or complaints today.  She was noted to have downtrending hemoglobin.  She does not hemorrhoids with minimal blood in stool, however  "otherwise denies hematuria, melena, significant hematochezia.  This was discussed with primary team.    Echocardiogram yesterday revealed EF 60-65%, mild MR.    Objective:     Vitals: Blood pressure 130/60, pulse (!) 114, temperature 99.2 °F (37.3 °C), temperature source Oral, resp. rate (!) 28, height 5' 4\" (1.626 m), weight 75.8 kg (167 lb), SpO2 94%., Body mass index is 28.67 kg/m².,   Orthostatic Blood Pressures      Flowsheet Row Most Recent Value   Blood Pressure 130/60 filed at 11/14/2024 0909   Patient Position - Orthostatic VS Lying filed at 11/14/2024 0400              Intake/Output Summary (Last 24 hours) at 11/14/2024 1028  Last data filed at 11/14/2024 0800  Gross per 24 hour   Intake 3822.16 ml   Output 735 ml   Net 3087.16 ml         Physical Exam:   GEN: Alert and oriented x 3, in no acute distress.  Well appearing and well nourished.   HEENT: Sclera anicteric, conjunctivae pink, mucous membranes moist. Oropharynx clear.   NECK: Supple, no significant JVD. Trachea midline.   HEART: Irregular rhythm, normal S1 and S2, no murmurs, clicks, gallops or rubs. PMI nondisplaced, no thrills.   LUNGS: Clear to auscultation bilaterally; no wheezes, rales, or rhonchi. No increased work of breathing or signs of respiratory distress.   ABDOMEN: Soft, nontender, non-distended.   EXTREMITIES: Bilateral lower extremity edema.  Skin warm and well perfused, no clubbing, cyanosis.  NEURO: No focal findings. Normal speech. Mood and affect normal.   SKIN: Bilateral lower extremity edema with erythematous, dry, cracked skin      Telemetry:  Telemetry Orders (From admission, onward)               24 Hour Telemetry Monitoring  Continuous x 24 Hours (Telem)        Question:  Reason for 24 Hour Telemetry  Answer:  Arrhythmias requiring acute medical intervention / PPM or ICD malfunction                     Telemetry Reviewed:  Atrial fibrillation with rates in the 90s-100s      Medications:      Current Facility-Administered " Medications:     acetaminophen (TYLENOL) tablet 650 mg, 650 mg, Oral, Q6H PRN, Lex Dick MD, 650 mg at 11/12/24 1945    apixaban (ELIQUIS) tablet 5 mg, 5 mg, Oral, BID, Lex Dick MD, 5 mg at 11/14/24 0816    ceFAZolin (ANCEF) IVPB (premix in dextrose) 2,000 mg 50 mL, 2,000 mg, Intravenous, Q8H, Lex Dick MD, Last Rate: 100 mL/hr at 11/14/24 0153, 2,000 mg at 11/14/24 0153    diltiazem (CARDIZEM) 125 mg in sodium chloride 0.9 % 125 mL infusion, 1-15 mg/hr, Intravenous, Titrated, Lex Dick MD, Last Rate: 10 mL/hr at 11/14/24 0906, 10 mg/hr at 11/14/24 0906    diphenhydrAMINE-zinc acetate (BENADRYL) 2-0.1 % cream, , Topical, TID PRN, Lex Dick MD    insulin lispro (HumALOG/ADMELOG) 100 units/mL subcutaneous injection 1-5 Units, 1-5 Units, Subcutaneous, TID AC, 1 Units at 11/14/24 0732 **AND** Fingerstick Glucose (POCT), , , TID AC, Lex Dick MD    levothyroxine tablet 125 mcg, 125 mcg, Oral, Early Morning, Lex Dick MD, 125 mcg at 11/14/24 0610    metoprolol tartrate (LOPRESSOR) tablet 25 mg, 25 mg, Oral, Q8H, Lex Dick MD, 25 mg at 11/14/24 0909    multi-electrolyte (PLASMALYTE-A/ISOLYTE-S PH 7.4) IV solution, 50 mL/hr, Intravenous, Continuous, Abbey Aguilera PA-C, Last Rate: 50 mL/hr at 11/14/24 0610, 50 mL/hr at 11/14/24 0610    oxyCODONE (ROXICODONE) IR tablet 5 mg, 5 mg, Oral, Q6H PRN, Lex Dick MD, 5 mg at 11/11/24 1709    potassium chloride 20 mEq IVPB (premix), 20 mEq, Intravenous, Once, Lex Dick MD, Last Rate: 50 mL/hr at 11/14/24 0930, 20 mEq at 11/14/24 0930    pravastatin (PRAVACHOL) tablet 40 mg, 40 mg, Oral, Daily With Dinner, Lex Dick MD, 40 mg at 11/13/24 1805     Labs & Results:    Results from last 7 days   Lab Units 11/10/24  1648 11/10/24  1531 11/10/24  1208   HS TNI 0HR ng/L  --   --  10   HS TNI 2HR ng/L  --  11  --    HS TNI 4HR ng/L 8  --   --    HSTNI D4 ng/L -2  --   --      Results from last 7 days   Lab Units 11/14/24  0609 11/13/24  0431 11/12/24  0510   WBC  Thousand/uL 12.23* 10.65* 11.66*   HEMOGLOBIN g/dL 9.3* 9.1* 9.9*   HEMATOCRIT % 29.3* 28.2* 30.5*   PLATELETS Thousands/uL 200 182 178         Results from last 7 days   Lab Units 11/14/24  0609 11/13/24  0431 11/12/24  0510 11/11/24  0552 11/10/24  1208   POTASSIUM mmol/L 3.2* 3.5 3.1* 3.5 3.4*   CHLORIDE mmol/L 103 102 100 100 96   CO2 mmol/L 23 20* 22 21 24   BUN mg/dL 11 14 26* 23 26*   CREATININE mg/dL 0.88 0.79 1.20 1.39* 1.30   CALCIUM mg/dL 7.5* 6.3* 7.4* 7.8* 8.8   ALK PHOS U/L  --   --   --  68 75   ALT U/L  --   --   --  16 13   AST U/L  --   --   --  25 19         Results from last 7 days   Lab Units 11/14/24  0609 11/12/24  0510   MAGNESIUM mg/dL 1.9 1.9       ** Please Note: Fluency DirectDictation voice to text software may have been used in the creation of this document. **

## 2024-11-14 NOTE — PLAN OF CARE
Problem: PAIN - ADULT  Goal: Verbalizes/displays adequate comfort level or baseline comfort level  Description: Interventions:  - Encourage patient to monitor pain and request assistance  - Assess pain using appropriate pain scale  - Administer analgesics based on type and severity of pain and evaluate response  - Implement non-pharmacological measures as appropriate and evaluate response  - Consider cultural and social influences on pain and pain management  - Notify physician/advanced practitioner if interventions unsuccessful or patient reports new pain  Outcome: Progressing     Problem: INFECTION - ADULT  Goal: Absence or prevention of progression during hospitalization  Description: INTERVENTIONS:  - Assess and monitor for signs and symptoms of infection  - Monitor lab/diagnostic results  - Monitor all insertion sites, i.e. indwelling lines, tubes, and drains  - Administer medications as ordered  - Instruct and encourage patient and family to use good hand hygiene technique  - Identify and instruct in appropriate isolation precautions for identified infection/condition  Outcome: Progressing  Goal: Absence of fever/infection during neutropenic period  Description: INTERVENTIONS:  - Monitor WBC    Outcome: Progressing     Problem: SAFETY ADULT  Goal: Patient will remain free of falls  Description: INTERVENTIONS:  - Educate patient/family on patient safety including physical limitations  - Instruct patient to call for assistance with activity   - Consult OT/PT to assist with strengthening/mobility   - Keep Call bell within reach  - Keep bed low and locked with side rails adjusted as appropriate  - Keep care items and personal belongings within reach  - Initiate and maintain comfort rounds  - Make Fall Risk Sign visible to staf  - Apply yellow socks and bracelet for high fall risk patients  - Consider moving patient to room near nurses station  Outcome: Progressing  Goal: Maintain or return to baseline ADL  function  Description: INTERVENTIONS:  -  Assess patient's ability to carry out ADLs; assess patient's baseline for ADL function and identify physical deficits which impact ability to perform ADLs (bathing, care of mouth/teeth, toileting, grooming, dressing, etc.)  - Assess/evaluate cause of self-care deficits   - Assess range of motion  - Assess patient's mobility; develop plan if impaired  - Assess patient's need for assistive devices and provide as appropriate  - Encourage maximum independence but intervene and supervise when necessary  - Involve family in performance of ADLs  - Assess for home care needs following discharge   - Consider OT consult to assist with ADL evaluation and planning for discharge  - Provide patient education as appropriate  Outcome: Progressing  Goal: Maintains/Returns to pre admission functional level  Description: INTERVENTIONS:  - Perform AM-PAC 6 Click Basic Mobility/ Daily Activity assessment daily.  - Set and communicate daily mobility goal to care team and patient/family/caregiver.   - Collaborate with rehabilitation services on mobility goals if consulted  - Out of bed for toileting  - Record patient progress and toleration of activity level   Outcome: Progressing     Problem: DISCHARGE PLANNING  Goal: Discharge to home or other facility with appropriate resources  Description: INTERVENTIONS:  - Identify barriers to discharge w/patient and caregiver  - Arrange for needed discharge resources and transportation as appropriate  - Identify discharge learning needs (meds, wound care, etc.)  - Arrange for interpretive services to assist at discharge as needed  - Refer to Case Management Department for coordinating discharge planning if the patient needs post-hospital services based on physician/advanced practitioner order or complex needs related to functional status, cognitive ability, or social support system  Outcome: Progressing     Problem: Knowledge Deficit  Goal:  Patient/family/caregiver demonstrates understanding of disease process, treatment plan, medications, and discharge instructions  Description: Complete learning assessment and assess knowledge base.  Interventions:  - Provide teaching at level of understanding  - Provide teaching via preferred learning methods  Outcome: Progressing     Problem: CARDIOVASCULAR - ADULT  Goal: Maintains optimal cardiac output and hemodynamic stability  Description: INTERVENTIONS:  - Monitor I/O, vital signs and rhythm  - Monitor for S/S and trends of decreased cardiac output  - Administer and titrate ordered vasoactive medications to optimize hemodynamic stability  - Assess quality of pulses, skin color and temperature  - Assess for signs of decreased coronary artery perfusion  - Instruct patient to report change in severity of symptoms  Outcome: Progressing  Goal: Absence of cardiac dysrhythmias or at baseline rhythm  Description: INTERVENTIONS:  - Continuous cardiac monitoring, vital signs, obtain 12 lead EKG if ordered  - Administer antiarrhythmic and heart rate control medications as ordered  - Cardizem drip initiated.   - Monitor electrolytes and administer replacement therapy as ordered  Outcome: Progressing

## 2024-11-14 NOTE — ASSESSMENT & PLAN NOTE
Blood cultures on 11/10 isolated group A Streptococcus.  Likely source is lower extremity stasis dermatitis/skin breaks.  - Continue cefazolin 2 g every 8 hours  - Follow BMP and CBCD to monitor for medication toxicities and treatment response  - Anticipate total 14-day antibiotic course, can finish with oral antibiotics when more ready for discharge (use cephalexin 1 g every 6 hours through 11/23/24)

## 2024-11-14 NOTE — ASSESSMENT & PLAN NOTE
"/71   Pulse 101   Temp 99 °F (37.2 °C) (Oral)   Resp (!) 23   Ht 5' 4\" (1.626 m)   Wt 75.8 kg (167 lb)   SpO2 95%   BMI 28.67 kg/m²   Stable pressures  "

## 2024-11-14 NOTE — ASSESSMENT & PLAN NOTE
Lab Results   Component Value Date    HGBA1C 7.8 (H) 11/11/2024       Recent Labs     11/13/24  1435 11/13/24  1730 11/13/24 2026 11/14/24  0731   POCGLU 303* 245* 304* 172*       Blood Sugar Average: Last 72 hrs:  (P) [id number]  Management per primary team

## 2024-11-14 NOTE — ASSESSMENT & PLAN NOTE
Appears consistent with drug reaction.  Pruritic but no hives.  See images in media 11/12/24.  History of beta-lactam allergies in the past.  Appears to be tolerating cefazolin without extension of rash or worsening symptoms)  - Added ceftriaxone to allergy list, but this is a non-severe allergy and other cephalosporins may be tolerated

## 2024-11-14 NOTE — ASSESSMENT & PLAN NOTE
"/60   Pulse (!) 110   Temp 99.2 °F (37.3 °C) (Oral)   Resp (!) 28   Ht 5' 4\" (1.626 m)   Wt 75.8 kg (167 lb)   SpO2 94%   BMI 28.67 kg/m²   EKG shows rapid afib  Previously holding patient's home Toprol-XL/diltiazem due to hypotension- likely reason for rapid afib  Continued to be in rapid A-fib even after starting her on p.o. Cardizem 60 mg every 6 hours  Continue Cardizem drip until heart rate consistently below 90 on SD2  continue Lopressor 25 mg every 8 hours and follow cardiology recommendations  Monitor on tele  Keep potassium close to 4 and mag 2  Continue Eliquis.  "

## 2024-11-14 NOTE — PLAN OF CARE
Problem: PAIN - ADULT  Goal: Verbalizes/displays adequate comfort level or baseline comfort level  Description: Interventions:  - Encourage patient to monitor pain and request assistance  - Assess pain using appropriate pain scale  - Administer analgesics based on type and severity of pain and evaluate response  - Implement non-pharmacological measures as appropriate and evaluate response  - Consider cultural and social influences on pain and pain management  - Notify physician/advanced practitioner if interventions unsuccessful or patient reports new pain  Outcome: Progressing     Problem: INFECTION - ADULT  Goal: Absence or prevention of progression during hospitalization  Description: INTERVENTIONS:  - Assess and monitor for signs and symptoms of infection  - Monitor lab/diagnostic results  - Monitor all insertion sites, i.e. indwelling lines, tubes, and drains  - Administer medications as ordered  - Instruct and encourage patient and family to use good hand hygiene technique  - Identify and instruct in appropriate isolation precautions for identified infection/condition  Outcome: Progressing  Goal: Absence of fever/infection during neutropenic period  Description: INTERVENTIONS:  - Monitor WBC    Outcome: Progressing     Problem: SAFETY ADULT  Goal: Patient will remain free of falls  Description: INTERVENTIONS:  - Educate patient/family on patient safety including physical limitations  - Instruct patient to call for assistance with activity   - Consult OT/PT to assist with strengthening/mobility   - Keep Call bell within reach  - Keep bed low and locked with side rails adjusted as appropriate  - Keep care items and personal belongings within reach  - Initiate and maintain comfort rounds  - Make Fall Risk Sign visible to staff  - Offer Toileting every 2 Hours, in advance of need  - Initiate/Maintain bed alarm  - Apply yellow socks and bracelet for high fall risk patients  - Consider moving patient to room near  nurses station  Outcome: Progressing  Goal: Maintain or return to baseline ADL function  Description: INTERVENTIONS:  -  Assess patient's ability to carry out ADLs; assess patient's baseline for ADL function and identify physical deficits which impact ability to perform ADLs (bathing, care of mouth/teeth, toileting, grooming, dressing, etc.)  - Assess/evaluate cause of self-care deficits   - Assess range of motion  - Assess patient's mobility; develop plan if impaired  - Assess patient's need for assistive devices and provide as appropriate  - Encourage maximum independence but intervene and supervise when necessary  - Involve family in performance of ADLs  - Assess for home care needs following discharge   - Consider OT consult to assist with ADL evaluation and planning for discharge  - Provide patient education as appropriate  Outcome: Progressing  Goal: Maintains/Returns to pre admission functional level  Description: INTERVENTIONS:  - Perform AM-PAC 6 Click Basic Mobility/ Daily Activity assessment daily.  - Set and communicate daily mobility goal to care team and patient/family/caregiver.   - Collaborate with rehabilitation services on mobility goals if consulted  - Perform Range of Motion 2 times a day.  - Reposition patient every 2 hours.  - Dangle patient 2 times a day  - Stand patient 2 times a day  - Ambulate patient 2 times a day  - Out of bed to chair 2 times a day   - Out of bed for meals 2 times a day  - Out of bed for toileting  - Record patient progress and toleration of activity level   Outcome: Progressing     Problem: DISCHARGE PLANNING  Goal: Discharge to home or other facility with appropriate resources  Description: INTERVENTIONS:  - Identify barriers to discharge w/patient and caregiver  - Arrange for needed discharge resources and transportation as appropriate  - Identify discharge learning needs (meds, wound care, etc.)  - Arrange for interpretive services to assist at discharge as needed  -  Refer to Case Management Department for coordinating discharge planning if the patient needs post-hospital services based on physician/advanced practitioner order or complex needs related to functional status, cognitive ability, or social support system  Outcome: Progressing     Problem: Knowledge Deficit  Goal: Patient/family/caregiver demonstrates understanding of disease process, treatment plan, medications, and discharge instructions  Description: Complete learning assessment and assess knowledge base.  Interventions:  - Provide teaching at level of understanding  - Provide teaching via preferred learning methods  Outcome: Progressing     Problem: CARDIOVASCULAR - ADULT  Goal: Maintains optimal cardiac output and hemodynamic stability  Description: INTERVENTIONS:  - Monitor I/O, vital signs and rhythm  - Monitor for S/S and trends of decreased cardiac output  - Administer and titrate ordered vasoactive medications to optimize hemodynamic stability  - Assess quality of pulses, skin color and temperature  - Assess for signs of decreased coronary artery perfusion  - Instruct patient to report change in severity of symptoms  Outcome: Progressing  Goal: Absence of cardiac dysrhythmias or at baseline rhythm  Description: INTERVENTIONS:  - Continuous cardiac monitoring, vital signs, obtain 12 lead EKG if ordered  - Administer antiarrhythmic and heart rate control medications as ordered  - Cardizem drip initiated.   - Monitor electrolytes and administer replacement therapy as ordered  Outcome: Progressing

## 2024-11-15 LAB
ANION GAP SERPL CALCULATED.3IONS-SCNC: 9 MMOL/L (ref 4–13)
BUN SERPL-MCNC: 13 MG/DL (ref 5–25)
CALCIUM SERPL-MCNC: 7.9 MG/DL (ref 8.4–10.2)
CHLORIDE SERPL-SCNC: 101 MMOL/L (ref 96–108)
CO2 SERPL-SCNC: 22 MMOL/L (ref 21–32)
CREAT SERPL-MCNC: 1.02 MG/DL (ref 0.6–1.3)
DME PARACHUTE DELIVERY DATE REQUESTED: NORMAL
DME PARACHUTE ITEM DESCRIPTION: NORMAL
DME PARACHUTE ORDER STATUS: NORMAL
DME PARACHUTE SUPPLIER NAME: NORMAL
DME PARACHUTE SUPPLIER PHONE: NORMAL
ERYTHROCYTE [DISTWIDTH] IN BLOOD BY AUTOMATED COUNT: 15.2 % (ref 11.6–15.1)
GFR SERPL CREATININE-BSD FRML MDRD: 53 ML/MIN/1.73SQ M
GLUCOSE SERPL-MCNC: 156 MG/DL (ref 65–140)
GLUCOSE SERPL-MCNC: 162 MG/DL (ref 65–140)
GLUCOSE SERPL-MCNC: 167 MG/DL (ref 65–140)
GLUCOSE SERPL-MCNC: 202 MG/DL (ref 65–140)
GLUCOSE SERPL-MCNC: 265 MG/DL (ref 65–140)
HCT VFR BLD AUTO: 31.8 % (ref 34.8–46.1)
HEMOCCULT STL QL: POSITIVE
HGB BLD-MCNC: 10.1 G/DL (ref 11.5–15.4)
MAGNESIUM SERPL-MCNC: 1.8 MG/DL (ref 1.9–2.7)
MCH RBC QN AUTO: 26.4 PG (ref 26.8–34.3)
MCHC RBC AUTO-ENTMCNC: 31.8 G/DL (ref 31.4–37.4)
MCV RBC AUTO: 83 FL (ref 82–98)
PLATELET # BLD AUTO: 276 THOUSANDS/UL (ref 149–390)
PMV BLD AUTO: 9.2 FL (ref 8.9–12.7)
POTASSIUM SERPL-SCNC: 3.6 MMOL/L (ref 3.5–5.3)
RBC # BLD AUTO: 3.82 MILLION/UL (ref 3.81–5.12)
SODIUM SERPL-SCNC: 132 MMOL/L (ref 135–147)
WBC # BLD AUTO: 13.45 THOUSAND/UL (ref 4.31–10.16)

## 2024-11-15 PROCEDURE — 82272 OCCULT BLD FECES 1-3 TESTS: CPT | Performed by: FAMILY MEDICINE

## 2024-11-15 PROCEDURE — 99233 SBSQ HOSP IP/OBS HIGH 50: CPT | Performed by: FAMILY MEDICINE

## 2024-11-15 PROCEDURE — 80048 BASIC METABOLIC PNL TOTAL CA: CPT | Performed by: FAMILY MEDICINE

## 2024-11-15 PROCEDURE — 85027 COMPLETE CBC AUTOMATED: CPT | Performed by: FAMILY MEDICINE

## 2024-11-15 PROCEDURE — 99232 SBSQ HOSP IP/OBS MODERATE 35: CPT | Performed by: INTERNAL MEDICINE

## 2024-11-15 PROCEDURE — 83735 ASSAY OF MAGNESIUM: CPT | Performed by: FAMILY MEDICINE

## 2024-11-15 PROCEDURE — NC001 PR NO CHARGE: Performed by: INTERNAL MEDICINE

## 2024-11-15 PROCEDURE — 82948 REAGENT STRIP/BLOOD GLUCOSE: CPT

## 2024-11-15 RX ORDER — MAGNESIUM SULFATE HEPTAHYDRATE 40 MG/ML
2 INJECTION, SOLUTION INTRAVENOUS ONCE
Status: COMPLETED | OUTPATIENT
Start: 2024-11-15 | End: 2024-11-15

## 2024-11-15 RX ORDER — DIGOXIN 0.25 MG/ML
250 INJECTION INTRAMUSCULAR; INTRAVENOUS EVERY 6 HOURS
Status: COMPLETED | OUTPATIENT
Start: 2024-11-15 | End: 2024-11-16

## 2024-11-15 RX ADMIN — LEVOTHYROXINE SODIUM 125 MCG: 125 TABLET ORAL at 07:23

## 2024-11-15 RX ADMIN — CEFAZOLIN SODIUM 2000 MG: 2 SOLUTION INTRAVENOUS at 18:06

## 2024-11-15 RX ADMIN — METOPROLOL TARTRATE 25 MG: 25 TABLET, FILM COATED ORAL at 00:36

## 2024-11-15 RX ADMIN — FUROSEMIDE 20 MG: 20 TABLET ORAL at 08:27

## 2024-11-15 RX ADMIN — INSULIN LISPRO 1 UNITS: 100 INJECTION, SOLUTION INTRAVENOUS; SUBCUTANEOUS at 16:58

## 2024-11-15 RX ADMIN — METOPROLOL TARTRATE 25 MG: 25 TABLET, FILM COATED ORAL at 16:57

## 2024-11-15 RX ADMIN — INSULIN LISPRO 1 UNITS: 100 INJECTION, SOLUTION INTRAVENOUS; SUBCUTANEOUS at 07:24

## 2024-11-15 RX ADMIN — PRAVASTATIN SODIUM 40 MG: 40 TABLET ORAL at 16:57

## 2024-11-15 RX ADMIN — APIXABAN 5 MG: 5 TABLET, FILM COATED ORAL at 08:27

## 2024-11-15 RX ADMIN — METOPROLOL TARTRATE 25 MG: 25 TABLET, FILM COATED ORAL at 08:32

## 2024-11-15 RX ADMIN — DIGOXIN 250 MCG: 0.25 INJECTION INTRAMUSCULAR; INTRAVENOUS at 23:23

## 2024-11-15 RX ADMIN — DIGOXIN 250 MCG: 0.25 INJECTION INTRAMUSCULAR; INTRAVENOUS at 11:30

## 2024-11-15 RX ADMIN — DILTIAZEM HYDROCHLORIDE 10 MG/HR: 5 INJECTION INTRAVENOUS at 02:48

## 2024-11-15 RX ADMIN — DILTIAZEM HYDROCHLORIDE 5 MG/HR: 5 INJECTION INTRAVENOUS at 16:57

## 2024-11-15 RX ADMIN — IRON SUCROSE 200 MG: 20 INJECTION, SOLUTION INTRAVENOUS at 09:57

## 2024-11-15 RX ADMIN — DIGOXIN 250 MCG: 0.25 INJECTION INTRAMUSCULAR; INTRAVENOUS at 16:57

## 2024-11-15 RX ADMIN — INSULIN LISPRO 1 UNITS: 100 INJECTION, SOLUTION INTRAVENOUS; SUBCUTANEOUS at 11:32

## 2024-11-15 RX ADMIN — CEFAZOLIN SODIUM 2000 MG: 2 SOLUTION INTRAVENOUS at 03:24

## 2024-11-15 RX ADMIN — MAGNESIUM SULFATE HEPTAHYDRATE 2 G: 40 INJECTION, SOLUTION INTRAVENOUS at 13:07

## 2024-11-15 RX ADMIN — APIXABAN 5 MG: 5 TABLET, FILM COATED ORAL at 17:00

## 2024-11-15 RX ADMIN — CEFAZOLIN SODIUM 2000 MG: 2 SOLUTION INTRAVENOUS at 09:57

## 2024-11-15 NOTE — CASE MANAGEMENT
Case Management Discharge Planning Note    Patient name Jenny Galarza  Location ICU 07/ICU 07 MRN [id number]  : 1945 Date 11/15/2024       Current Admission Date: 11/10/2024  Current Admission Diagnosis:Sepsis (HCC)   Patient Active Problem List    Diagnosis Date Noted Date Diagnosed    Persistent atrial fibrillation with RVR (HCC) 2024     Hypo-osmolality and hyponatremia 2024     Rash 2024     Streptococcal bacteremia 2024     Venous stasis dermatitis of both lower extremities 2024     Sepsis (HCC) 11/10/2024     Muscle weakness (generalized) 11/10/2024     Intractable nausea and vomiting 2019     Chronic kidney disease-mineral and bone disorder 10/07/2019     Benign essential hypertension 2019     Hyperkalemia 2017     Gout 2016     Stage III chronic kidney disease (HCC) 2016     Paroxysmal atrial fibrillation (HCC) in RVR 2016     Hypothyroidism 2014     Primary hypertension 2014     Hyperlipidemia, mixed 10/13/2014     Diabetes mellitus, type 2 (HCC) 10/13/2014     Anemia 10/13/2014       LOS (days): 5  Geometric Mean LOS (GMLOS) (days): 3.5  Days to GMLOS:-1.5     OBJECTIVE:  Risk of Unplanned Readmission Score: 15.37         Current admission status: Inpatient   Preferred Pharmacy:   [id number] - [place] - [address]  [address]  [facility] [place] [address]  Phone: [phone] Fax: [phone]    Primary Care Provider: Kathya Irene DO    Primary Insurance: [employer] REP  Secondary Insurance:     DISCHARGE DETAILS:                                          Other Referral/Resources/Interventions Provided:  Referral Comments: Pt still in A fib w AVR per AM SLIM rounds.  Remains in ICU on IV cardizem, IV abx.  Receiving IV iron for depleted levels.  Met w pt to discuss d/c plan;  she is requesting RW which has been recommended by therapy.  Order placed in [place];  awaiting  approval.  PT/OT assigned pt Level III;  provided pt w outpt [facility] therapy locations and also  agencies that are available.  Pt wants to read material and think about it.  Anticipate d/c in 48 hrs per SLIM rounds this AM.         Treatment Team Recommendation: Outpatient Rehab, Home with Home Health Care  Discharge Destination Plan:: Outpatient Rehab, Home with Home Health Care  Transport at Discharge : Family                             IMM Given (Date):: 11/15/24  IMM Given to:: Patient

## 2024-11-15 NOTE — ASSESSMENT & PLAN NOTE
Lab Results   Component Value Date    HGBA1C 7.8 (H) 11/11/2024       Recent Labs     11/14/24  1653 11/14/24  2133 11/15/24  0640 11/15/24  0722   POCGLU 152* 185* 162* 167*       Blood Sugar Average: Last 72 hrs:  (P) [id number]  Management per primary team

## 2024-11-15 NOTE — PLAN OF CARE
Problem: PAIN - ADULT  Goal: Verbalizes/displays adequate comfort level or baseline comfort level  Description: Interventions:  - Encourage patient to monitor pain and request assistance  - Assess pain using appropriate pain scale  - Administer analgesics based on type and severity of pain and evaluate response  - Implement non-pharmacological measures as appropriate and evaluate response  - Consider cultural and social influences on pain and pain management  - Notify physician/advanced practitioner if interventions unsuccessful or patient reports new pain  Outcome: Progressing     Problem: INFECTION - ADULT  Goal: Absence or prevention of progression during hospitalization  Description: INTERVENTIONS:  - Assess and monitor for signs and symptoms of infection  - Monitor lab/diagnostic results  - Monitor all insertion sites, i.e. indwelling lines, tubes, and drains  - Administer medications as ordered  - Instruct and encourage patient and family to use good hand hygiene technique  - Identify and instruct in appropriate isolation precautions for identified infection/condition  Outcome: Progressing  Goal: Absence of fever/infection during neutropenic period  Description: INTERVENTIONS:  - Monitor WBC    Outcome: Progressing     Problem: SAFETY ADULT  Goal: Patient will remain free of falls  Description: INTERVENTIONS:  - Educate patient/family on patient safety including physical limitations  - Instruct patient to call for assistance with activity   - Consult OT/PT to assist with strengthening/mobility   - Keep Call bell within reach  - Keep bed low and locked with side rails adjusted as appropriate  - Keep care items and personal belongings within reach  - Initiate and maintain comfort rounds  - Make Fall Risk Sign visible to staff  - Offer Toileting every 2 Hours, in advance of need  - Initiate/Maintain bed/chair alarm  - Apply yellow socks and bracelet for high fall risk patients  - Consider moving patient to room  near nurses station  Outcome: Progressing  Goal: Maintain or return to baseline ADL function  Description: INTERVENTIONS:  -  Assess patient's ability to carry out ADLs; assess patient's baseline for ADL function and identify physical deficits which impact ability to perform ADLs (bathing, care of mouth/teeth, toileting, grooming, dressing, etc.)  - Assess/evaluate cause of self-care deficits   - Assess range of motion  - Assess patient's mobility; develop plan if impaired  - Assess patient's need for assistive devices and provide as appropriate  - Encourage maximum independence but intervene and supervise when necessary  - Involve family in performance of ADLs  - Assess for home care needs following discharge   - Consider OT consult to assist with ADL evaluation and planning for discharge  - Provide patient education as appropriate  Outcome: Progressing  Goal: Maintains/Returns to pre admission functional level  Description: INTERVENTIONS:  - Perform AM-PAC 6 Click Basic Mobility/ Daily Activity assessment daily.  - Set and communicate daily mobility goal to care team and patient/family/caregiver.   - Collaborate with rehabilitation services on mobility goals if consulted  - Reposition patient every 2 hours.  - Ambulate patient 3 times a day  - Out of bed to chair 3 times a day   - Out of bed for toileting  - Record patient progress and toleration of activity level   Outcome: Progressing     Problem: DISCHARGE PLANNING  Goal: Discharge to home or other facility with appropriate resources  Description: INTERVENTIONS:  - Identify barriers to discharge w/patient and caregiver  - Arrange for needed discharge resources and transportation as appropriate  - Identify discharge learning needs (meds, wound care, etc.)  - Arrange for interpretive services to assist at discharge as needed  - Refer to Case Management Department for coordinating discharge planning if the patient needs post-hospital services based on  physician/advanced practitioner order or complex needs related to functional status, cognitive ability, or social support system  Outcome: Progressing     Problem: Knowledge Deficit  Goal: Patient/family/caregiver demonstrates understanding of disease process, treatment plan, medications, and discharge instructions  Description: Complete learning assessment and assess knowledge base.  Interventions:  - Provide teaching at level of understanding  - Provide teaching via preferred learning methods  Outcome: Progressing     Problem: CARDIOVASCULAR - ADULT  Goal: Maintains optimal cardiac output and hemodynamic stability  Description: INTERVENTIONS:  - Monitor I/O, vital signs and rhythm  - Monitor for S/S and trends of decreased cardiac output  - Administer and titrate ordered vasoactive medications to optimize hemodynamic stability  - Assess quality of pulses, skin color and temperature  - Assess for signs of decreased coronary artery perfusion  - Instruct patient to report change in severity of symptoms  Outcome: Progressing  Goal: Absence of cardiac dysrhythmias or at baseline rhythm  Description: INTERVENTIONS:  - Continuous cardiac monitoring, vital signs, obtain 12 lead EKG if ordered  - Administer antiarrhythmic and heart rate control medications as ordered  - Cardizem drip initiated.   - Monitor electrolytes and administer replacement therapy as ordered  Outcome: Progressing

## 2024-11-15 NOTE — ASSESSMENT & PLAN NOTE
iron panel shows iron level of <10, profound BRITTON- start IV venofer- cleared by ID   Check FOBT  May need further GI consult to r/o GI bleed if FOBT (+)   Has hemorrhoids, and does bleed off and on which can lead to chronic iron deficiency

## 2024-11-15 NOTE — ASSESSMENT & PLAN NOTE
Likely source of bacteremia.  No overt signs of cellulitis, but she does have tenderness and indurated skin, as well as multiple areas of dry cracked skin.  There is weeping superficial wound on the left leg.  Long history of venous insufficiency.  No DVT's.  - Antibiotics as above  - Needs risk factor reduction: [place] use of emollients to dry cracked skin, aggressive leg elevation, compression or Ace wrap as tolerated

## 2024-11-15 NOTE — PROGRESS NOTES
"Progress Note - Hospitalist   Name: Jenny Galarza 79 y.o. female I MRN: [id number]  Unit/Bed#: ICU 07 I Date of Admission: 11/10/2024   Date of Service: 11/15/2024 I Hospital Day: 5    Assessment & Plan  Sepsis (HCC)  Improving  Blood culture: GAS  ID following  Currently on cefazolin, was on ceftriaxone but developed rash with it and improved with cefazolin  Procal downtrending  Leukocytosis could be reactive to RVR, will continue to monitor  Streptococcal bacteremia  Patient went 1/2 blood culture positive for Streptococcus pyogenes, unclear source of infection.  Per infectious disease recommending to continue with IV ceftriaxone  Recommended Doppler ultrasound to rule out septic thrombophlebitis    Paroxysmal atrial fibrillation (HCC) in RVR  /65   Pulse (!) 126   Temp 98.4 °F (36.9 °C) (Oral)   Resp (!) 27   Ht 5' 4\" (1.626 m)   Wt 75.8 kg (167 lb)   SpO2 96%   BMI 28.67 kg/m²   RVR noted on tele this am, digoxin load per cardiology  Previously holding patient's home Toprol-XL/diltiazem due to hypotension- likely reason for rapid afib  Continued to be in rapid A-fib even after starting her on p.o. Cardizem 60 mg every 6 hours  Continue Cardizem drip until heart rate consistently below 90 on SD2  continue Lopressor 25 mg every 8 hours and follow cardiology recommendations  Monitor on tele  Keep potassium close to 4 and mag 2  Continue Eliquis.  Muscle weakness (generalized)  Coming in with generalized weakness/poor oral intake/lightheadedness/chills  Patient also noted to be hypotensive in the ER with systolic blood pressure readings in the 90s which is now improved  Patient receiving empiric IV fluids/IV antibiotics  Hold home antihypertensive agents.  PT OT eval requested  Hypothyroidism  Continue with Synthroid  Hyperlipidemia, mixed  Continue statin.  Diabetes mellitus, type 2 (HCC)  Lab Results   Component Value Date    HGBA1C 7.8 (H) 11/11/2024       Recent Labs     11/14/24  1653 " "11/14/24  2133 11/15/24  0640 11/15/24  0722   POCGLU 152* 185* 162* 167*       Blood Sugar Average: Last 72 hrs:  (P) [id number]  Holding home Jardiance/Glyxambi  Patient placed on sliding scale coverage with ACHS checks  Consistent carb diet  Hypoglycemia protocol.  Stage III chronic kidney disease (HCC)  Lab Results   Component Value Date    EGFR 63 11/14/2024    EGFR 72 11/13/2024    EGFR 43 11/12/2024    CREATININE 0.88 11/14/2024    CREATININE 0.79 11/13/2024    CREATININE 1.20 11/12/2024     Stable renal function  Noted slightly elevation of creatinine from her baseline 1.1-1.2  Suspected in the setting of bacteremia.  Continue with IV fluids and reassess BMP in the morning.  Avoid nephrotoxic agents and hypoperfusion.  Venous stasis dermatitis of both lower extremities  Venous duplex does not show any DVT  Hypo-osmolality and hyponatremia  Pseudohyponatremia, due to elevated blood glucose   Rash  Start benadyl cream  Avoid systemic benadryl for now unless symptoms not controlled to topical  Likely allergy based   Antibiotic switched to IV cefazolin from ceftriaxone  Primary hypertension  /65   Pulse (!) 126   Temp 98.4 °F (36.9 °C) (Oral)   Resp (!) 27   Ht 5' 4\" (1.626 m)   Wt 75.8 kg (167 lb)   SpO2 96%   BMI 28.67 kg/m²   Stable pressures  Anemia  iron panel shows iron level of <10, profound BRITTON- start IV venofer- cleared by ID   Check FOBT  May need further GI consult to r/o GI bleed if FOBT (+)   Has hemorrhoids, and does bleed off and on which can lead to chronic iron deficiency    Persistent atrial fibrillation with RVR (HCC)  As above    VTE Pharmacologic Prophylaxis: VTE Score: 5 High Risk (Score >/= 5) - Pharmacological DVT Prophylaxis Ordered: apixaban (Eliquis). Sequential Compression Devices Ordered.    Mobility:   Basic Mobility Inpatient Raw Score: 18  JH-HLM Goal: 6: Walk 10 steps or more  JH-HLM Achieved: 6: Walk 10 steps or more  JH-HLM Goal achieved. Continue to " encourage appropriate mobility.    Patient Centered Rounds: I performed bedside rounds with nursing staff today.   Discussions with Specialists or Other Care Team Provider: ID, cardiology    Education and Discussions with Family / Patient: Updated  (son) via phone.  Detailed A&P from today was discussed with chrystal.     Current Length of Stay: 5 day(s)  Current Patient Status: Inpatient   Certification Statement: The patient will continue to require additional inpatient hospital stay due to see above  Discharge Plan: Anticipate discharge in 48 hrs to home.    Code Status: Level 1 - Full Code    Subjective   Pt seen and examined at bedside during AM rounds  Feels well  No acute overnight event reported  No cp so b nvd reported    Objective :  Temp:  [98.2 °F (36.8 °C)-99.5 °F (37.5 °C)] 98.4 °F (36.9 °C)  HR:  [] 126  BP: (102-130)/(60-74) 111/65  Resp:  [19-28] 27  SpO2:  [91 %-100 %] 96 %  O2 Device: None (Room air)    Body mass index is 28.67 kg/m².     Input and Output Summary (last 24 hours):     Intake/Output Summary (Last 24 hours) at 11/15/2024 0837  Last data filed at 11/15/2024 0215  Gross per 24 hour   Intake 1258.67 ml   Output 2700 ml   Net -1441.33 ml       Physical Exam  General- Awake, alert and oriented x 3, looks comfortable  HEENT- Normocephalic, atraumatic, oral mucosa- moist  Neck- Supple, No carotid bruit, no JVD  CVS- Normal S1/ S2, afib 100-110, No murmur, No edema  Respiratory system- B/L clear breath sounds, no wheezing  Abdomen- Soft, Non distended, no tenderness, Bowel sound- present 4 quads  Genitourinary- No suprapubic tenderness, No CVA tenderness  Skin- skin rash improved  Musculoskeletal- No gross deformity  Psych- No acute psychosis  CNS- CN II- XII grossly intact, No acute focal neurologic deficit noted      Lines/Drains:        Telemetry:  Telemetry Orders (From admission, onward)               24 Hour Telemetry Monitoring  Continuous x 24 Hours (Telem)         Question:  Reason for 24 Hour Telemetry  Answer:  Arrhythmias requiring acute medical intervention / PPM or ICD malfunction                     Telemetry Reviewed: Atrial fibrillation. HR averaging 120  Indication for Continued Telemetry Use: Arrthymias requiring medical therapy               Lab Results: I have reviewed the following results:   Results from last 7 days   Lab Units 11/14/24  0609   WBC Thousand/uL 12.23*   HEMOGLOBIN g/dL 9.3*   HEMATOCRIT % 29.3*   PLATELETS Thousands/uL 200   SEGS PCT % 87*   LYMPHO PCT % 6*   MONO PCT % 6   EOS PCT % 0     Results from last 7 days   Lab Units 11/14/24  0609 11/12/24  0510 11/11/24  0552   SODIUM mmol/L 133*   < > 133*   POTASSIUM mmol/L 3.2*   < > 3.5   CHLORIDE mmol/L 103   < > 100   CO2 mmol/L 23   < > 21   BUN mg/dL 11   < > 23   CREATININE mg/dL 0.88   < > 1.39*   ANION GAP mmol/L 7   < > 12   CALCIUM mg/dL 7.5*   < > 7.8*   ALBUMIN g/dL  --   --  3.4*   TOTAL BILIRUBIN mg/dL  --   --  1.14*   ALK PHOS U/L  --   --  68   ALT U/L  --   --  16   AST U/L  --   --  25   GLUCOSE RANDOM mg/dL 182*   < > 130    < > = values in this interval not displayed.         Results from last 7 days   Lab Units 11/15/24  0722 11/15/24  0640 11/14/24  2133 11/14/24  1653 11/14/24  1116 11/14/24  0731 11/13/24  2026 11/13/24  1730 11/13/24  1435 11/13/24  1117 11/13/24  0701 11/12/24  2106   POC GLUCOSE mg/dl 167* 162* 185* 152* 197* 172* 304* 245* 303* 239* 174* 202*     Results from last 7 days   Lab Units 11/11/24  0552   HEMOGLOBIN A1C % 7.8*     Results from last 7 days   Lab Units 11/14/24  0609 11/13/24  0431 11/11/24  0552 11/10/24  1421 11/10/24  1208   LACTIC ACID mmol/L  --   --   --  0.7  --    PROCALCITONIN ng/ml 8.12* 12.52* 41.52*  --  3.70*       Recent Cultures (last 7 days):   Results from last 7 days   Lab Units 11/10/24  2101 11/10/24  1421 11/10/24  1415   BLOOD CULTURE   --  No Growth After 4 Days. Streptococcus pyogenes (Group A)*   GRAM STAIN RESULT   --    --  Gram positive cocci in pairs and chains*   URINE CULTURE  No Growth <1000 cfu/mL  --   --        Imaging Results Review: No pertinent imaging studies reviewed.  Other Study Results Review: No additional pertinent studies reviewed.    Last 24 Hours Medication List:     Current Facility-Administered Medications:     acetaminophen (TYLENOL) tablet 650 mg, Q6H PRN    apixaban (ELIQUIS) tablet 5 mg, BID    ceFAZolin (ANCEF) IVPB (premix in dextrose) 2,000 mg 50 mL, Q8H, Last Rate: 2,000 mg (11/15/24 0324)    diltiazem (CARDIZEM) 125 mg in sodium chloride 0.9 % 125 mL infusion, Titrated, Last Rate: 10 mg/hr (11/15/24 0248)    diphenhydrAMINE-zinc acetate (BENADRYL) 2-0.1 % cream, TID PRN    furosemide (LASIX) tablet 20 mg, Daily    insulin lispro (HumALOG/ADMELOG) 100 units/mL subcutaneous injection 1-5 Units, TID AC **AND** Fingerstick Glucose (POCT), TID AC    levothyroxine tablet 125 mcg, Early Morning    metoprolol tartrate (LOPRESSOR) tablet 25 mg, Q8H    oxyCODONE (ROXICODONE) IR tablet 5 mg, Q6H PRN    pravastatin (PRAVACHOL) tablet 40 mg, Daily With Dinner    Administrative Statements   Today, Patient Was Seen By: Lex Dick MD  I have spent a total time of 50 minutes in caring for this patient on the day of the visit/encounter including Diagnostic results, Prognosis, Risks and benefits of tx options, Instructions for management, Patient and family education, Importance of tx compliance, Risk factor reductions, Impressions, Counseling / Coordination of care, Documenting in the medical record, Reviewing / ordering tests, medicine, procedures  , Obtaining or reviewing history  , and Communicating with other healthcare professionals .    **Please Note: This note may have been constructed using a voice recognition system.**

## 2024-11-15 NOTE — ASSESSMENT & PLAN NOTE
"/65   Pulse (!) 126   Temp 98.4 °F (36.9 °C) (Oral)   Resp (!) 27   Ht 5' 4\" (1.626 m)   Wt 75.8 kg (167 lb)   SpO2 96%   BMI 28.67 kg/m²   Stable pressures  "

## 2024-11-15 NOTE — PLAN OF CARE
Problem: PAIN - ADULT  Goal: Verbalizes/displays adequate comfort level or baseline comfort level  Description: Interventions:  - Encourage patient to monitor pain and request assistance  - Assess pain using appropriate pain scale  - Administer analgesics based on type and severity of pain and evaluate response  - Implement non-pharmacological measures as appropriate and evaluate response  - Consider cultural and social influences on pain and pain management  - Notify physician/advanced practitioner if interventions unsuccessful or patient reports new pain  Outcome: Progressing     Problem: INFECTION - ADULT  Goal: Absence or prevention of progression during hospitalization  Description: INTERVENTIONS:  - Assess and monitor for signs and symptoms of infection  - Monitor lab/diagnostic results  - Monitor all insertion sites, i.e. indwelling lines, tubes, and drains  - Administer medications as ordered  - Instruct and encourage patient and family to use good hand hygiene technique  - Identify and instruct in appropriate isolation precautions for identified infection/condition  Outcome: Progressing  Goal: Absence of fever/infection during neutropenic period  Description: INTERVENTIONS:  - Monitor WBC    Outcome: Progressing     Problem: SAFETY ADULT  Goal: Patient will remain free of falls  Description: INTERVENTIONS:  - Educate patient/family on patient safety including physical limitations  - Instruct patient to call for assistance with activity   - Consult OT/PT to assist with strengthening/mobility   - Keep Call bell within reach  - Keep bed low and locked with side rails adjusted as appropriate  - Keep care items and personal belongings within reach  - Initiate and maintain comfort rounds  - Make Fall Risk Sign visible to staff  - Apply yellow socks and bracelet for high fall risk patients  - Consider moving patient to room near nurses station  Outcome: Progressing  Goal: Maintain or return to baseline ADL  function  Description: INTERVENTIONS:  -  Assess patient's ability to carry out ADLs; assess patient's baseline for ADL function and identify physical deficits which impact ability to perform ADLs (bathing, care of mouth/teeth, toileting, grooming, dressing, etc.)  - Assess/evaluate cause of self-care deficits   - Assess range of motion  - Assess patient's mobility; develop plan if impaired  - Assess patient's need for assistive devices and provide as appropriate  - Encourage maximum independence but intervene and supervise when necessary  - Involve family in performance of ADLs  - Assess for home care needs following discharge   - Consider OT consult to assist with ADL evaluation and planning for discharge  - Provide patient education as appropriate  Outcome: Progressing  Goal: Maintains/Returns to pre admission functional level  Description: INTERVENTIONS:  - Perform AM-PAC 6 Click Basic Mobility/ Daily Activity assessment daily.  - Set and communicate daily mobility goal to care team and patient/family/caregiver.   - Collaborate with rehabilitation services on mobility goals if consulted  - Out of bed for toileting  - Record patient progress and toleration of activity level   Outcome: Progressing     Problem: DISCHARGE PLANNING  Goal: Discharge to home or other facility with appropriate resources  Description: INTERVENTIONS:  - Identify barriers to discharge w/patient and caregiver  - Arrange for needed discharge resources and transportation as appropriate  - Identify discharge learning needs (meds, wound care, etc.)  - Arrange for interpretive services to assist at discharge as needed  - Refer to Case Management Department for coordinating discharge planning if the patient needs post-hospital services based on physician/advanced practitioner order or complex needs related to functional status, cognitive ability, or social support system  Outcome: Progressing     Problem: Knowledge Deficit  Goal:  Patient/family/caregiver demonstrates understanding of disease process, treatment plan, medications, and discharge instructions  Description: Complete learning assessment and assess knowledge base.  Interventions:  - Provide teaching at level of understanding  - Provide teaching via preferred learning methods  Outcome: Progressing     Problem: CARDIOVASCULAR - ADULT  Goal: Maintains optimal cardiac output and hemodynamic stability  Description: INTERVENTIONS:  - Monitor I/O, vital signs and rhythm  - Monitor for S/S and trends of decreased cardiac output  - Administer and titrate ordered vasoactive medications to optimize hemodynamic stability  - Assess quality of pulses, skin color and temperature  - Assess for signs of decreased coronary artery perfusion  - Instruct patient to report change in severity of symptoms  Outcome: Progressing  Goal: Absence of cardiac dysrhythmias or at baseline rhythm  Description: INTERVENTIONS:  - Continuous cardiac monitoring, vital signs, obtain 12 lead EKG if ordered  - Administer antiarrhythmic and heart rate control medications as ordered  - Cardizem drip initiated.   - Monitor electrolytes and administer replacement therapy as ordered  Outcome: Progressing

## 2024-11-15 NOTE — PROGRESS NOTES
Cardiology Progress Note - Jenny Galarza 79 y.o. female MRN: [id number]    Unit/Bed#: ICU 07 Encounter: [id number]      Assessment/Plan:   Assessment & Plan  Streptococcal bacteremia  Sepsis/bacteremia  Management per ID/primary team.  Suspected secondary to venous stasis dermatitis/lower extremity skin breaks.  On IV antibiotics.    Persistent atrial fibrillation with RVR (HCC)  Longstanding persistent atrial fibrillation with RVR  She remains in atrial fibrillation with RVR on telemetry.  Likely secondary to holding home AV armond blocking agents in the setting of soft BPs and sepsis/bacteremia.  Begin digoxin 250 mcg IV every 6 hours x 4 doses.  Continue Cardizem gtt. and recommend decreasing rate to 7.5 mg/h and down titrating as tolerated.  Continue Lopressor 25 mg every 8 hours.    Continue anticoagulation with Eliquis.  [id number] 5.   Continue telemetry    Anemia  Management per primary team    Venous stasis dermatitis of both lower extremities  Management per primary team/ID    Primary hypertension  Continue Lasix, Lopressor, Cardizem drip per above.  Continue to monitor BPs closely.    Hyperlipidemia, mixed  Continue statin    Stage III chronic kidney disease ([facility])  Lab Results   Component Value Date    EGFR 53 11/15/2024    EGFR 63 11/14/2024    EGFR 72 11/13/2024    CREATININE 1.02 11/15/2024    CREATININE 0.88 11/14/2024    CREATININE 0.79 11/13/2024   Management per primary team    Diabetes mellitus, type 2 ([facility])  Lab Results   Component Value Date    HGBA1C 7.8 (H) 11/11/2024       Recent Labs     11/14/24  1653 11/14/24  2133 11/15/24  0640 11/15/24  0722   POCGLU 152* 185* 162* 167*       Blood Sugar Average: Last 72 hrs:  (P) 206.7762985662936817  Management per primary team    Hypo-osmolality and hyponatremia  Management per primary team        Subjective:   Patient seen and examined.  She offers no specific cardiac concerns or complaints today.    Objective:     Vitals: Blood pressure 111/65,  "pulse 102, temperature 98.4 °F (36.9 °C), temperature source Oral, resp. rate (!) 33, height 5' 4\" (1.626 m), weight 75.8 kg (167 lb), SpO2 100%., Body mass index is 28.67 kg/m².,   Orthostatic Blood Pressures      Flowsheet Row Most Recent Value   Blood Pressure 111/65 filed at 11/15/2024 0832   Patient Position - Orthostatic VS Lying filed at 11/15/2024 0300              Intake/Output Summary (Last 24 hours) at 11/15/2024 1044  Last data filed at 11/15/2024 0801  Gross per 24 hour   Intake 1258.67 ml   Output 3250 ml   Net -1991.33 ml         Physical Exam:   GEN: Alert and oriented x 3, in no acute distress.  Well appearing and well nourished.   HEENT: Sclera anicteric, conjunctivae pink, mucous membranes moist. Oropharynx clear.   NECK: Supple, no significant JVD. Trachea midline.   HEART: Tachycardic, irregular rhythm, normal S1 and S2, no murmurs, clicks, gallops or rubs. PMI nondisplaced, no thrills.   LUNGS: Clear to auscultation bilaterally; no wheezes, rales, or rhonchi. No increased work of breathing or signs of respiratory distress.   ABDOMEN: Soft, nontender, non-distended.   EXTREMITIES: Bilateral lower extremity edema.  Skin warm and well perfused, no clubbing, cyanosis.  NEURO: No focal findings. Normal speech. Mood and affect normal.   SKIN: Erythematous rash        Telemetry:  Telemetry Orders (From admission, onward)               24 Hour Telemetry Monitoring  Continuous x 24 Hours (Telem)        Question:  Reason for 24 Hour Telemetry  Answer:  Arrhythmias requiring acute medical intervention / PPM or ICD malfunction                     Telemetry Reviewed:  Atrial fibrillation with rates predominantly 120s-130s      Medications:      Current Facility-Administered Medications:     acetaminophen (TYLENOL) tablet 650 mg, 650 mg, Oral, Q6H PRN, Lex Dick MD, 650 mg at 11/12/24 1945    apixaban (ELIQUIS) tablet 5 mg, 5 mg, Oral, BID, Lex Dick MD, 5 mg at 11/15/24 0827    ceFAZolin (ANCEF) IVPB " (premix in dextrose) 2,000 mg 50 mL, 2,000 mg, Intravenous, Q8H, Lex Dick MD, Last Rate: 100 mL/hr at 11/15/24 0957, 2,000 mg at 11/15/24 0957    digoxin (LANOXIN) injection 250 mcg, 250 mcg, Intravenous, Q6H, Koffi Rosario PA-C    diltiazem (CARDIZEM) 125 mg in sodium chloride 0.9 % 125 mL infusion, 1-15 mg/hr, Intravenous, Titrated, Lex Dick MD, Last Rate: 10 mL/hr at 11/15/24 0248, 10 mg/hr at 11/15/24 0248    diphenhydrAMINE-zinc acetate (BENADRYL) 2-0.1 % cream, , Topical, TID PRN, Lex Dick MD    furosemide (LASIX) tablet 20 mg, 20 mg, Oral, Daily, Lex Dick MD, 20 mg at 11/15/24 0827    insulin lispro (HumALOG/ADMELOG) 100 units/mL subcutaneous injection 1-5 Units, 1-5 Units, Subcutaneous, TID AC, 1 Units at 11/15/24 0724 **AND** Fingerstick Glucose (POCT), , , TID AC, Lex Dick MD    iron sucrose (VENOFER) 200 mg in sodium chloride 0.9 % 100 mL IVPB, 200 mg, Intravenous, Daily, Lex Dick MD, Last Rate: 100 mL/hr at 11/15/24 0957, 200 mg at 11/15/24 0957    levothyroxine tablet 125 mcg, 125 mcg, Oral, Early Morning, Lex Dick MD, 125 mcg at 11/15/24 0723    metoprolol tartrate (LOPRESSOR) tablet 25 mg, 25 mg, Oral, Q8H, Lex Dick MD, 25 mg at 11/15/24 0832    oxyCODONE (ROXICODONE) IR tablet 5 mg, 5 mg, Oral, Q6H PRN, Lex Dick MD, 5 mg at 11/11/24 1709    pravastatin (PRAVACHOL) tablet 40 mg, 40 mg, Oral, Daily With Dinner, Lex Dick MD, 40 mg at 11/14/24 1649     Labs & Results:    Results from last 7 days   Lab Units 11/10/24  1648 11/10/24  1531 11/10/24  1208   HS TNI 0HR ng/L  --   --  10   HS TNI 2HR ng/L  --  11  --    HS TNI 4HR ng/L 8  --   --    HSTNI D4 ng/L -2  --   --      Results from last 7 days   Lab Units 11/15/24  0910 11/14/24  0609 11/13/24  0431   WBC Thousand/uL 13.45* 12.23* 10.65*   HEMOGLOBIN g/dL 10.1* 9.3* 9.1*   HEMATOCRIT % 31.8* 29.3* 28.2*   PLATELETS Thousands/uL 276 200 182         Results from last 7 days   Lab Units 11/15/24  0910  11/14/24  0609 11/13/24  0431 11/12/24  0510 11/11/24  0552 11/10/24  1208   POTASSIUM mmol/L 3.6 3.2* 3.5   < > 3.5 3.4*   CHLORIDE mmol/L 101 103 102   < > 100 96   CO2 mmol/L 22 23 20*   < > 21 24   BUN mg/dL 13 11 14   < > 23 26*   CREATININE mg/dL 1.02 0.88 0.79   < > 1.39* 1.30   CALCIUM mg/dL 7.9* 7.5* 6.3*   < > 7.8* 8.8   ALK PHOS U/L  --   --   --   --  68 75   ALT U/L  --   --   --   --  16 13   AST U/L  --   --   --   --  25 19    < > = values in this interval not displayed.         Results from last 7 days   Lab Units 11/15/24  0910 11/14/24  0609 11/12/24  0510   MAGNESIUM mg/dL 1.8* 1.9 1.9       ** Please Note: Fluency DirectDictation voice to text software may have been used in the creation of this document. **

## 2024-11-15 NOTE — ASSESSMENT & PLAN NOTE
Appears consistent with drug reaction.  Pruritic but no hives.  See images in media 11/12/24.  History of beta-lactam allergies in the past.  Appears to be tolerating cefazolin without extension of rash or worsening symptoms)  - Added ceftriaxone to allergy list, but this is a non-severe allergy and other cephalosporins may be tolerated  - She is tolerating cefazolin

## 2024-11-15 NOTE — ASSESSMENT & PLAN NOTE
"/65   Pulse (!) 126   Temp 98.4 °F (36.9 °C) (Oral)   Resp (!) 27   Ht 5' 4\" (1.626 m)   Wt 75.8 kg (167 lb)   SpO2 96%   BMI 28.67 kg/m²   RVR noted on tele this am, digoxin load per cardiology  Previously holding patient's home Toprol-XL/diltiazem due to hypotension- likely reason for rapid afib  Continued to be in rapid A-fib even after starting her on p.o. Cardizem 60 mg every 6 hours  Continue Cardizem drip until heart rate consistently below 90 on SD2  continue Lopressor 25 mg every 8 hours and follow cardiology recommendations  Monitor on tele  Keep potassium close to 4 and mag 2  Continue Eliquis.  "

## 2024-11-15 NOTE — ASSESSMENT & PLAN NOTE
Longstanding persistent atrial fibrillation with RVR  She remains in atrial fibrillation with RVR on telemetry.  Likely secondary to holding home AV armond blocking agents in the setting of soft BPs and sepsis/bacteremia.  Begin digoxin 250 mcg IV every 6 hours x 4 doses.  Continue Cardizem gtt. and recommend decreasing rate to 7.5 mg/h and down titrating as tolerated.  Continue Lopressor 25 mg every 8 hours.    Continue anticoagulation with Eliquis.  [id number] 5.   Continue telemetry

## 2024-11-15 NOTE — ASSESSMENT & PLAN NOTE
Lab Results   Component Value Date    HGBA1C 7.8 (H) 11/11/2024     Recent Labs     11/14/24  1653 11/14/24  2133 11/15/24  0640 11/15/24  0722   POCGLU 152* 185* 162* 167*   This is a risk factor for infection.    - Maintain euglycemia to improve WBC function and improve wound healing   - Needs regular Podiatry follow up for foot care

## 2024-11-15 NOTE — PROGRESS NOTES
Progress Note - Infectious Disease   Name: Jenny Galarza 79 y.o. female I MRN: [id number]  Unit/Bed#: ICU 07 I Date of Admission: 11/10/2024   Date of Service: 11/15/2024 I Hospital Day: 5     Assessment & Plan  Sepsis (HCC)  As evidenced by fever and leukocytosis.  Due to group A strep bacteremia.  Improving on appropriate antibiotics.  - Continue antibiotics and further management as below  - Trend fever, WBC count, vitals  - Additional supportive care per primary team  Streptococcal bacteremia  Blood cultures on 11/10 isolated group A Streptococcus.  Likely source is lower extremity stasis dermatitis/skin breaks.  - Continue cefazolin 2 g every 8 hours  - Follow BMP and CBCD to monitor for medication toxicities and treatment response  - Anticipate total 14-day antibiotic course, can finish with oral antibiotics when more ready for discharge (use cephalexin 1 g every 6 hours through 11/23/24)    Rash  Appears consistent with drug reaction.  Pruritic but no hives.  See images in media 11/12/24.  History of beta-lactam allergies in the past.  Appears to be tolerating cefazolin without extension of rash or worsening symptoms)  - Added ceftriaxone to allergy list, but this is a non-severe allergy and other cephalosporins may be tolerated  - She is tolerating cefazolin  Venous stasis dermatitis of both lower extremities  Likely source of bacteremia.  No overt signs of cellulitis, but she does have tenderness and indurated skin, as well as multiple areas of dry cracked skin.  There is weeping superficial wound on the left leg.  Long history of venous insufficiency.  No DVT's.  - Antibiotics as above  - Needs risk factor reduction: [place] use of emollients to dry cracked skin, aggressive leg elevation, compression or Ace wrap as tolerated  Diabetes mellitus, type 2 (HCC)  Lab Results   Component Value Date    HGBA1C 7.8 (H) 11/11/2024     Recent Labs     11/14/24  1653 11/14/24  2133 11/15/24  0640 11/15/24  0722    POCGLU 152* 185* 162* 167*   This is a risk factor for infection.    - Maintain euglycemia to improve WBC function and improve wound healing   - Needs regular Podiatry follow up for foot care  Paroxysmal atrial fibrillation (HCC) in RVR  Some episodes of uncontrolled rate likely in the setting of infection.  Should continue to improve as infection is treated.    - Management otherwise per primary team  Stage III chronic kidney disease (HCC)  Lab Results   Component Value Date    EGFR 63 2024    EGFR 72 2024    EGFR 43 2024    CREATININE 0.88 2024    CREATININE 0.79 2024    CREATININE 1.20 2024   Estimated Creatinine Clearance: 53.3 mL/min (by C-G formula based on SCr of 0.88 mg/dL).   - No adjustment needed for cefazolin or cephalexin at this CrCl  - Dose adjust other medications       I have discussed the above management plan in detail with the primary service, Dr. Dick, who agrees with ID care plan above.    ID consult service will continue to follow along with you.  Okay for discharge from ID standpoint if otherwise ready over the weekend.  We will plan to reevaluate the patient if still admitted on 2024.  Please contact the on-call ID provider with any more urgent questions or concerns over the weekend.    I have performed an extensive review of the medical records in Epic including review of the notes, radiographs, and laboratory results.    Antibiotics:  Total antibiotics day 6  Cefazolin day 4    24 Hour Events:  Rates better controlled.    Subjective:  She reports feeling well.  No fevers or chills, legs feeling better.  She reports the rash on her back chest and legs is also improving, less itchy.  No pain.    Objective:  Vitals:  Temp:  [98.2 °F (36.8 °C)-99.5 °F (37.5 °C)] 98.4 °F (36.9 °C)  HR:  [] 97  Resp:  [19-28] 27  BP: (102-130)/(60-74) 114/60  SpO2:  [91 %-100 %] 96 %  Temp (24hrs), Av.8 °F (37.1 °C), Min:98.2 °F (36.8 °C), Max:99.5 °F (37.5  °C)  Current: Temperature: 98.4 °F (36.9 °C)    Physical Exam:   General:  Well developed, no acute distress  HEENT:  Normocephalic, sclera anicteric, MMM  Lungs:  Unlabored respirations   Abdomen:  Soft, nontender, nondistended, normal bowel sounds  Extremities:  Chronic woody edema bilaterally   Skin:  Erythematous macular rash over chest, back, arms; lighter and more confluent  Neuro:  Awake, alert, interactive        Labs:   All pertinent labs and imaging studies were personally reviewed  Results from last 7 days   Lab Units 11/14/24  0609 11/13/24 0431 11/12/24  0510   WBC Thousand/uL 12.23* 10.65* 11.66*   HEMOGLOBIN g/dL 9.3* 9.1* 9.9*   PLATELETS Thousands/uL 200 182 178     Results from last 7 days   Lab Units 11/14/24  0609 11/13/24 0431 11/12/24  0510 11/11/24  0552 11/10/24  1208   SODIUM mmol/L 133* 135 131* 133* 131*   POTASSIUM mmol/L 3.2* 3.5 3.1* 3.5 3.4*   CHLORIDE mmol/L 103 102 100 100 96   CO2 mmol/L 23 20* 22 21 24   BUN mg/dL 11 14 26* 23 26*   CREATININE mg/dL 0.88 0.79 1.20 1.39* 1.30   EGFR ml/min/1.73sq m 63 72 43 36 39   CALCIUM mg/dL 7.5* 6.3* 7.4* 7.8* 8.8   AST U/L  --   --   --  25 19   ALT U/L  --   --   --  16 13   ALK PHOS U/L  --   --   --  68 75     Results from last 7 days   Lab Units 11/14/24  0609 11/13/24  0431 11/11/24  0552 11/10/24  1208   PROCALCITONIN ng/ml 8.12* 12.52* 41.52* 3.70*         Results from last 7 days   Lab Units 11/14/24  0609   FERRITIN ng/mL 120           Microbiology:  Results from last 7 days   Lab Units 11/10/24  2101 11/10/24  1421 11/10/24  1415   BLOOD CULTURE   --  No Growth After 4 Days. Streptococcus pyogenes (Group A)*   GRAM STAIN RESULT   --   --  Gram positive cocci in pairs and chains*   URINE CULTURE  No Growth <1000 cfu/mL  --   --        Imaging:  No new imaging.      Pete Lion MD  [facility]

## 2024-11-15 NOTE — ASSESSMENT & PLAN NOTE
Lab Results   Component Value Date    EGFR 53 11/15/2024    EGFR 63 11/14/2024    EGFR 72 11/13/2024    CREATININE 1.02 11/15/2024    CREATININE 0.88 11/14/2024    CREATININE 0.79 11/13/2024   Management per primary team

## 2024-11-15 NOTE — ASSESSMENT & PLAN NOTE
Lab Results   Component Value Date    HGBA1C 7.8 (H) 11/11/2024       Recent Labs     11/14/24  1653 11/14/24  2133 11/15/24  0640 11/15/24  0722   POCGLU 152* 185* 162* 167*       Blood Sugar Average: Last 72 hrs:  (P) 206.0732598805798208  Holding home Jardiance/Glyxambi  Patient placed on sliding scale coverage with ACHS checks  Consistent carb diet  Hypoglycemia protocol.

## 2024-11-16 LAB
ANION GAP SERPL CALCULATED.3IONS-SCNC: 6 MMOL/L (ref 4–13)
ANISOCYTOSIS BLD QL SMEAR: PRESENT
BACTERIA BLD CULT: NORMAL
BASOPHILS # BLD MANUAL: 0 THOUSAND/UL (ref 0–0.1)
BASOPHILS NFR MAR MANUAL: 0 % (ref 0–1)
BUN SERPL-MCNC: 13 MG/DL (ref 5–25)
CALCIUM SERPL-MCNC: 7.7 MG/DL (ref 8.4–10.2)
CHLORIDE SERPL-SCNC: 103 MMOL/L (ref 96–108)
CO2 SERPL-SCNC: 25 MMOL/L (ref 21–32)
CREAT SERPL-MCNC: 1.02 MG/DL (ref 0.6–1.3)
EOSINOPHIL # BLD MANUAL: 1.35 THOUSAND/UL (ref 0–0.4)
EOSINOPHIL NFR BLD MANUAL: 11 % (ref 0–6)
ERYTHROCYTE [DISTWIDTH] IN BLOOD BY AUTOMATED COUNT: 15.1 % (ref 11.6–15.1)
GFR SERPL CREATININE-BSD FRML MDRD: 53 ML/MIN/1.73SQ M
GLUCOSE SERPL-MCNC: 142 MG/DL (ref 65–140)
GLUCOSE SERPL-MCNC: 142 MG/DL (ref 65–140)
GLUCOSE SERPL-MCNC: 167 MG/DL (ref 65–140)
GLUCOSE SERPL-MCNC: 178 MG/DL (ref 65–140)
HCT VFR BLD AUTO: 34.5 % (ref 34.8–46.1)
HGB BLD-MCNC: 11 G/DL (ref 11.5–15.4)
LYMPHOCYTES # BLD AUTO: 1.35 THOUSAND/UL (ref 0.6–4.47)
LYMPHOCYTES # BLD AUTO: 10 % (ref 14–44)
MAGNESIUM SERPL-MCNC: 1.9 MG/DL (ref 1.9–2.7)
MCH RBC QN AUTO: 26.6 PG (ref 26.8–34.3)
MCHC RBC AUTO-ENTMCNC: 31.9 G/DL (ref 31.4–37.4)
MCV RBC AUTO: 84 FL (ref 82–98)
MONOCYTES # BLD AUTO: 0.49 THOUSAND/UL (ref 0–1.22)
MONOCYTES NFR BLD: 4 % (ref 4–12)
MYELOCYTE ABSOLUTE CT: 0.12 THOUSAND/UL (ref 0–0.1)
MYELOCYTES NFR BLD MANUAL: 1 % (ref 0–1)
NEUTROPHILS # BLD MANUAL: 8.81 THOUSAND/UL (ref 1.85–7.62)
NEUTS SEG NFR BLD AUTO: 72 % (ref 43–75)
OVALOCYTES BLD QL SMEAR: PRESENT
PLATELET # BLD AUTO: 313 THOUSANDS/UL (ref 149–390)
PLATELET BLD QL SMEAR: ADEQUATE
PMV BLD AUTO: 9.1 FL (ref 8.9–12.7)
POLYCHROMASIA BLD QL SMEAR: PRESENT
POTASSIUM SERPL-SCNC: 3.6 MMOL/L (ref 3.5–5.3)
PROMYELOCYTE ABSOLUTE CT: 0.12 THOUSAND/UL (ref 0–0)
PROMYELOCYTES NFR BLD MANUAL: 1 % (ref 0–0)
RBC # BLD AUTO: 4.13 MILLION/UL (ref 3.81–5.12)
RBC MORPH BLD: PRESENT
SODIUM SERPL-SCNC: 134 MMOL/L (ref 135–147)
VARIANT LYMPHS # BLD AUTO: 1 %
WBC # BLD AUTO: 12.24 THOUSAND/UL (ref 4.31–10.16)

## 2024-11-16 PROCEDURE — 99222 1ST HOSP IP/OBS MODERATE 55: CPT | Performed by: INTERNAL MEDICINE

## 2024-11-16 PROCEDURE — 83735 ASSAY OF MAGNESIUM: CPT | Performed by: FAMILY MEDICINE

## 2024-11-16 PROCEDURE — 85007 BL SMEAR W/DIFF WBC COUNT: CPT | Performed by: FAMILY MEDICINE

## 2024-11-16 PROCEDURE — 99233 SBSQ HOSP IP/OBS HIGH 50: CPT | Performed by: FAMILY MEDICINE

## 2024-11-16 PROCEDURE — 80048 BASIC METABOLIC PNL TOTAL CA: CPT | Performed by: FAMILY MEDICINE

## 2024-11-16 PROCEDURE — 82948 REAGENT STRIP/BLOOD GLUCOSE: CPT

## 2024-11-16 PROCEDURE — 99232 SBSQ HOSP IP/OBS MODERATE 35: CPT | Performed by: INTERNAL MEDICINE

## 2024-11-16 PROCEDURE — 85027 COMPLETE CBC AUTOMATED: CPT | Performed by: FAMILY MEDICINE

## 2024-11-16 RX ORDER — DILTIAZEM HYDROCHLORIDE 30 MG/1
30 TABLET, FILM COATED ORAL EVERY 6 HOURS SCHEDULED
Status: DISCONTINUED | OUTPATIENT
Start: 2024-11-16 | End: 2024-11-18

## 2024-11-16 RX ORDER — PANTOPRAZOLE SODIUM 40 MG/1
40 TABLET, DELAYED RELEASE ORAL
Status: DISCONTINUED | OUTPATIENT
Start: 2024-11-16 | End: 2024-11-18 | Stop reason: HOSPADM

## 2024-11-16 RX ADMIN — IRON SUCROSE 200 MG: 20 INJECTION, SOLUTION INTRAVENOUS at 08:06

## 2024-11-16 RX ADMIN — APIXABAN 5 MG: 5 TABLET, FILM COATED ORAL at 17:50

## 2024-11-16 RX ADMIN — CEFAZOLIN SODIUM 2000 MG: 2 SOLUTION INTRAVENOUS at 17:50

## 2024-11-16 RX ADMIN — METOPROLOL TARTRATE 25 MG: 25 TABLET, FILM COATED ORAL at 17:50

## 2024-11-16 RX ADMIN — FUROSEMIDE 20 MG: 20 TABLET ORAL at 08:06

## 2024-11-16 RX ADMIN — DILTIAZEM HYDROCHLORIDE 30 MG: 30 TABLET ORAL at 11:30

## 2024-11-16 RX ADMIN — INSULIN LISPRO 1 UNITS: 100 INJECTION, SOLUTION INTRAVENOUS; SUBCUTANEOUS at 11:33

## 2024-11-16 RX ADMIN — PANTOPRAZOLE SODIUM 40 MG: 40 TABLET, DELAYED RELEASE ORAL at 11:30

## 2024-11-16 RX ADMIN — METOPROLOL TARTRATE 25 MG: 25 TABLET, FILM COATED ORAL at 01:57

## 2024-11-16 RX ADMIN — DIGOXIN 250 MCG: 0.25 INJECTION INTRAMUSCULAR; INTRAVENOUS at 04:47

## 2024-11-16 RX ADMIN — CEFAZOLIN SODIUM 2000 MG: 2 SOLUTION INTRAVENOUS at 02:00

## 2024-11-16 RX ADMIN — CEFAZOLIN SODIUM 2000 MG: 2 SOLUTION INTRAVENOUS at 09:59

## 2024-11-16 RX ADMIN — DILTIAZEM HYDROCHLORIDE 30 MG: 30 TABLET ORAL at 17:50

## 2024-11-16 RX ADMIN — PRAVASTATIN SODIUM 40 MG: 40 TABLET ORAL at 17:50

## 2024-11-16 RX ADMIN — INSULIN LISPRO 1 UNITS: 100 INJECTION, SOLUTION INTRAVENOUS; SUBCUTANEOUS at 17:52

## 2024-11-16 RX ADMIN — LEVOTHYROXINE SODIUM 125 MCG: 125 TABLET ORAL at 07:07

## 2024-11-16 RX ADMIN — APIXABAN 5 MG: 5 TABLET, FILM COATED ORAL at 08:06

## 2024-11-16 RX ADMIN — METOPROLOL TARTRATE 25 MG: 25 TABLET, FILM COATED ORAL at 09:11

## 2024-11-16 NOTE — ASSESSMENT & PLAN NOTE
"/58 (BP Location: Left arm)   Pulse 102   Temp 98.7 °F (37.1 °C) (Oral)   Resp 22   Ht 5' 4\" (1.626 m)   Wt 75.8 kg (167 lb)   SpO2 95%   BMI 28.67 kg/m²   RVR noted on tele this am, on digoxin now s/p load   Previously holding patient's home Toprol-XL/diltiazem due to hypotension- likely reason for rapid afib  Continued to be in rapid A-fib even after starting her on p.o. Cardizem 60 mg every 6 hours  Patient now off of Cardizem drip and started on 30 mg every 6 hours of p.o. Cardizem in addition to digoxin.  Monitor blood pressures closely  continue Lopressor 25 mg every 8 hours and follow cardiology recommendations  Monitor on tele  Keep potassium close to 4 and mag 2  Continue Eliquis.  "

## 2024-11-16 NOTE — CONSULTS
Consultation - Gastroenterology   Name: Jenny Galarza 79 y.o. female I MRN: [id number]  Unit/Bed#: ICU 07 I Date of Admission: 11/10/2024   Date of Service: 11/16/2024 I Hospital Day: 6   [facility]ation -  [facility]  Jenny Galarza 79 y.o. female MRN: [id number]  Unit/Bed#: ICU 07 Encounter: [id number]      Inpatient consult to gastroenterology  Consult performed by: Adriane Marrero PA-C  Consult ordered by: Lex Dick MD        Reason for Consult / Principal Problem: Iron deficiency anemia, +FIT      ASSESSMENT & PLAN:    Iron deficiency anemia  +Stool for occult blood  Family history of stomach cancer  Streptococcal Bacteremia    - Patient is admitted with a streptococcal bacteremia.  GI was consulted due to an iron deficiency anemia and positive stool for occult blood.  - No reports of melena.  She has a long history of intermittent rectal bleeding from hemorrhoids.  - HGB today is 11.    - She reports she had a colonoscopy with Dr. Metcalf 1 year ago which showed hemorrhoids and diverticulosis otherwise normal.  - She reports a grandfather and uncle with stomach cancer.  - PPI course.  - Monitor CBC.  Iron replacement per SLIM.  - Monitor for any obvious gastrointestinal bleeding.  - Will plan for an outpatient endoscopic evaluation/EGD in several weeks after resolution of her bacteremia. Note: she will need to hold her Eliquis 2 days prior to the procedure.    HPI: Jenny Galarza is a 79 y.o. year old female with a PMH with a PMH of DM, HTN, atrial fibrillation on Eliquis, hypothyroidism who initially presented to the ED with complaints of chills, weakness, and lightheadedness.  She was admitted with sepsis and found to have streptococcal bacteremia.  ID is following and treating her bacteremia.  GI was consulted due to an iron deficiency anemia.  Hemoglobin today is 11.  She had a positive stool for occult blood.  She denies any melena.  She reports ports a history of recurrent,  intermittent rectal bleeding due to her hemorrhoids.  She reports that this has been a long issue for years.  She had a recent colonoscopy with Dr. Metcalf 1 year ago in October 2023 which she reports was normal except for hemorrhoids and diverticulosis.  She denies any abdominal pain.  She denies any nausea or vomiting.  She reports she had an EGD for GERD many years ago.  She reports she has a grandfather and uncle with stomach cancer.    REVIEW OF SYSTEMS:     CONSTITUTIONAL: Denies any fever, chills, or rigors.   HEENT: No earache or tinnitus. Denies hearing loss or visual disturbances.  CARDIOVASCULAR: No chest pain or palpitations.   RESPIRATORY: Denies any cough, hemoptysis, shortness of breath or dyspnea on exertion.  GASTROINTESTINAL: As noted in the History of Present Illness.   GENITOURINARY: No problems with urination. Denies any hematuria or dysuria.  NEUROLOGIC: No dizziness or vertigo, denies headaches.   MUSCULOSKELETAL: Denies any muscle or joint pain.   SKIN: Denies skin rashes or itching.   ENDOCRINE: Denies excessive thirst. Denies intolerance to heat or cold.  PSYCHOSOCIAL: Denies depression or anxiety. Denies any recent memory loss.     Historical Information   Past Medical History:   Diagnosis Date    A-fib (HCC)     Anemia     Chronic kidney disease     Diabetes mellitus (HCC)     Hypertension      Past Surgical History:   Procedure Laterality Date    CATARACT EXTRACTION, BILATERAL      CHOLECYSTECTOMY      GALLBLADDER SURGERY      HERNIA REPAIR      REPLACEMENT TOTAL KNEE      TONSILLECTOMY       Social History   Social History     Substance and Sexual Activity   Alcohol Use Never     Social History     Substance and Sexual Activity   Drug Use No     Social History     Tobacco Use   Smoking Status Former   Smokeless Tobacco Former     Family History   Problem Relation Age of Onset    No Known Problems Mother     Heart disease Father     No Known Problems Sister        Meds/Allergies        Medications Prior to Admission:     alendronate (FOSAMAX) 35 mg tablet    apixaban (ELIQUIS) 5 mg    diltiazem (TIAZAC) 180 MG 24 hr capsule    Empagliflozin (JARDIANCE PO)    furosemide (LASIX) 20 mg tablet    Glyxambi 25-5 MG TABS    levothyroxine 125 mcg tablet    levothyroxine 175 mcg tablet    metoprolol succinate (TOPROL-XL) 100 mg 24 hr tablet    montelukast (SINGULAIR) 10 mg tablet    Multiple Vitamins-Minerals (CENTRUM SILVER) tablet    rosuvastatin (CRESTOR) 5 mg tablet    simvastatin (ZOCOR) 40 mg tablet    Vitamin D, Ergocalciferol, 2000 units CAPS    Current Facility-Administered Medications:     acetaminophen (TYLENOL) tablet 650 mg, Q6H PRN    apixaban (ELIQUIS) tablet 5 mg, BID    ceFAZolin (ANCEF) IVPB (premix in dextrose) 2,000 mg 50 mL, Q8H, Last Rate: 2,000 mg (11/16/24 0959)    diltiazem (CARDIZEM) tablet 30 mg, Q6H JACKY    diphenhydrAMINE-zinc acetate (BENADRYL) 2-0.1 % cream, TID PRN    furosemide (LASIX) tablet 20 mg, Daily    insulin lispro (HumALOG/ADMELOG) 100 units/mL subcutaneous injection 1-5 Units, TID AC **AND** Fingerstick Glucose (POCT), TID AC    iron sucrose (VENOFER) 200 mg in sodium chloride 0.9 % 100 mL IVPB, Daily, Last Rate: 200 mg (11/16/24 0806)    levothyroxine tablet 125 mcg, Early Morning    metoprolol tartrate (LOPRESSOR) tablet 25 mg, Q8H    oxyCODONE (ROXICODONE) IR tablet 5 mg, Q6H PRN    pravastatin (PRAVACHOL) tablet 40 mg, Daily With Dinner    Allergies   Allergen Reactions    Amoxicillin-Pot Clavulanate Rash and Hives    Bacitracin Itching and Edema     Action Taken: osorio swelling, had to go to er;     Ceftriaxone Rash     Patient received 2 doses of ceftriaxone during 11/2024 admission. Developed pruritic coalescing macular rash over bilateral arms, chest, back. No respiratory symptoms or swelling.  Did not appear to be hives.  Pictures available in Media tab from 11/12/24.    Clindamycin      Other reaction(s): Rash       Objective   Blood pressure  "109/58, pulse 102, temperature 98.7 °F (37.1 °C), temperature source Oral, resp. rate 22, height 5' 4\" (1.626 m), weight 75.8 kg (167 lb), SpO2 95%.    Intake/Output Summary (Last 24 hours) at 11/16/2024 1042  Last data filed at 11/16/2024 0900  Gross per 24 hour   Intake 869.06 ml   Output 2201 ml   Net -1331.94 ml         PHYSICAL EXAM:      General Appearance:   Alert, cooperative, no distress, appears stated age    HEENT:   Normocephalic, atraumatic, anicteric     Neck:  Supple, symmetrical, trachea midline, no adenopathy;    thyroid: no enlargement/tenderness/nodules; no carotid  bruit or JVD    Lungs:   Clear to auscultation bilaterally; no rales, rhonchi or wheezing; respirations unlabored    Heart::   +S1 and S2; no murmur, rub, or gallop.   Abdomen:   Soft, non-tender, non-distended; normal bowel sounds; no masses, no organomegaly    Genitalia:   Deferred    Rectal:   Deferred    Extremities:  No cyanosis, clubbing   Pulses:  2+ and symmetric all extremities    Skin:  Skin color, texture, turgor normal, no rashes or lesions    Lymph nodes:  No palpable cervical, axillary or inguinal lymphadenopathy        Lab Results:   No results displayed because visit has over 200 results.          Imaging Studies: I have personally reviewed pertinent imaging studies.      Adriane Marrero PA-C  11/16/2024,10:42 AM        "

## 2024-11-16 NOTE — ASSESSMENT & PLAN NOTE
iron panel shows iron level of <10, profound BRITTON- start IV venofer- cleared by ID   FOBT is positive  GI recommends outpatient endoscopy and has signed off  Has hemorrhoids, and does bleed off and on which can lead to chronic iron deficiency

## 2024-11-16 NOTE — ASSESSMENT & PLAN NOTE
Lab Results   Component Value Date    HGBA1C 7.8 (H) 11/11/2024       Recent Labs     11/15/24  0722 11/15/24  1131 11/15/24  1538 11/16/24  0705   POCGLU 167* 202* 156* 142*       Blood Sugar Average: Last 72 hrs:  (P) 200  Holding home Jardiance/Glyxambi  Patient placed on sliding scale coverage with ACHS checks  Consistent carb diet  Hypoglycemia protocol.

## 2024-11-16 NOTE — PROGRESS NOTES
Progress Note - Cardiology   Name: Jenny Galarza 79 y.o. female I MRN: [id number]  Unit/Bed#: ICU 07 I Date of Admission: 11/10/2024   Date of Service: 11/16/2024 I Hospital Day: 6    Assessment & Plan  Persistent atrial fibrillation with RVR (HCC)  Longstanding persistent atrial fibrillation with RVR  Heart rates better controlled in the 80s to 100s   S/p IV digoxin, dig level ordered for tomorrow  Discontinue Cardizem drip and start p.o. Cardizem 30 mg every 6 hours  Continue Lopressor 25 mg every 8 hours.    Continue anticoagulation with Eliquis.  [id number].   Continue telemetry    Streptococcal bacteremia  Sepsis/bacteremia  Management per ID/primary team.  Suspected secondary to venous stasis dermatitis/lower extremity skin breaks.  On IV antibiotics.    Anemia  Management per primary team    Primary hypertension  Continue Lasix, Lopressor, Cardizem   Continue to monitor BPs closely.    Hyperlipidemia, mixed  Continue statin    Stage III chronic kidney disease ([facility])  Lab Results   Component Value Date    EGFR 53 11/16/2024    EGFR 53 11/15/2024    EGFR 63 11/14/2024    CREATININE 1.02 11/16/2024    CREATININE 1.02 11/15/2024    CREATININE 0.88 11/14/2024   Management per primary team    Diabetes mellitus, type 2 ([facility])  Lab Results   Component Value Date    HGBA1C 7.8 (H) 11/11/2024       Recent Labs     11/15/24  1131 11/15/24  1538 11/16/24  0705 11/16/24  1132   POCGLU 202* 156* 142* 167*       Blood Sugar Average: Last 72 hrs:  (P) 197.8  Management per primary team      Subjective   Chief Complaint: Feeling better      Objective :  Temp:  [98 °F (36.7 °C)-99.1 °F (37.3 °C)] 98.4 °F (36.9 °C)  HR:  [] 89  BP: ()/(58-68) 109/58  Resp:  [17-29] 28  SpO2:  [94 %-99 %] 95 %  O2 Device: None (Room air)  Orthostatic Blood Pressures      Flowsheet Row Most Recent Value   Blood Pressure 109/58 filed at 11/16/2024 0700   Patient Position - Orthostatic VS Lying filed at 11/16/2024 0700           First Weight: Weight - Scale: 76 kg (167 lb 8.8 oz) (11/10/24 1757)  Vitals:    11/10/24 1757 11/13/24 1041   Weight: 76 kg (167 lb 8.8 oz) 75.8 kg (167 lb)     Physical Exam  Vitals and nursing note reviewed.   Constitutional:       General: She is not in acute distress.     Appearance: She is well-developed.   HENT:      Head: Normocephalic and atraumatic.   Eyes:      Conjunctiva/sclera: Conjunctivae normal.   Cardiovascular:      Rate and Rhythm: Rhythm irregular.      Heart sounds: No murmur heard.  Pulmonary:      Effort: Pulmonary effort is normal. No respiratory distress.      Breath sounds: Normal breath sounds.   Abdominal:      Palpations: Abdomen is soft.      Tenderness: There is no abdominal tenderness.   Musculoskeletal:         General: No swelling.      Cervical back: Neck supple.   Skin:     General: Skin is warm and dry.      Capillary Refill: Capillary refill takes less than 2 seconds.   Neurological:      Mental Status: She is alert.   Psychiatric:         Mood and Affect: Mood normal.           Lab Results: I have reviewed the following results:  Results from last 7 days   Lab Units 11/16/24  0553 11/15/24  0910 11/14/24  0609   WBC Thousand/uL 12.24* 13.45* 12.23*   HEMOGLOBIN g/dL 11.0* 10.1* 9.3*   HEMATOCRIT % 34.5* 31.8* 29.3*   PLATELETS Thousands/uL 313 276 200     Results from last 7 days   Lab Units 11/16/24  0553 11/15/24  0910 11/14/24  0609   POTASSIUM mmol/L 3.6 3.6 3.2*   CHLORIDE mmol/L 103 101 103   CO2 mmol/L 25 22 23   BUN mg/dL 13 13 11   CREATININE mg/dL 1.02 1.02 0.88   CALCIUM mg/dL 7.7* 7.9* 7.5*         Lab Results   Component Value Date    HGBA1C 7.8 (H) 11/11/2024     Lab Results   Component Value Date    TROPONINI <0.02 11/13/2019

## 2024-11-16 NOTE — PROGRESS NOTES
"Progress Note - Hospitalist   Name: Jenny Galarza 79 y.o. female I MRN: [id number]  Unit/Bed#: ICU 07 I Date of Admission: 11/10/2024   Date of Service: 11/16/2024 I Hospital Day: 6    Assessment & Plan  Sepsis (HCC)  Improving  Blood culture: GAS  ID following  Currently on cefazolin, was on ceftriaxone but developed rash with it and improved with cefazolin  Procal downtrending  Leukocytosis could be reactive to RVR, will continue to monitor  Streptococcal bacteremia  Plan to treat for total of 14 days until 11/23/2024, transition to p.o. at discharge.  ID recommends Keflex 1 g every 6 hours.  Until then continue IV antibiotics  Patient had 1/2 blood culture positive for Streptococcus pyogenes, unclear source of infection. Likely Lower extremity wound  Per infectious disease recommending to continue with IV ceftriaxone  Recommended Doppler ultrasound to rule out septic thrombophlebitis    Paroxysmal atrial fibrillation (HCC) in RVR  /58 (BP Location: Left arm)   Pulse 102   Temp 98.7 °F (37.1 °C) (Oral)   Resp 22   Ht 5' 4\" (1.626 m)   Wt 75.8 kg (167 lb)   SpO2 95%   BMI 28.67 kg/m²   RVR noted on tele this am, on digoxin now s/p load   Previously holding patient's home Toprol-XL/diltiazem due to hypotension- likely reason for rapid afib  Continued to be in rapid A-fib even after starting her on p.o. Cardizem 60 mg every 6 hours  Patient now off of Cardizem drip and started on 30 mg every 6 hours of p.o. Cardizem in addition to digoxin.  Monitor blood pressures closely  continue Lopressor 25 mg every 8 hours and follow cardiology recommendations  Monitor on tele  Keep potassium close to 4 and mag 2  Continue Eliquis.  Anemia  iron panel shows iron level of <10, profound BRITTON- start IV venofer- cleared by ID   FOBT is positive  GI recommends outpatient endoscopy and has signed off  Has hemorrhoids, and does bleed off and on which can lead to chronic iron deficiency    Muscle weakness " "(generalized)  Coming in with generalized weakness/poor oral intake/lightheadedness/chills  Patient also noted to be hypotensive in the ER with systolic blood pressure readings in the 90s which is now improved  Patient receiving empiric IV fluids/IV antibiotics  Hold home antihypertensive agents.  PT OT caden requested  Hypothyroidism  Continue with Synthroid  Hyperlipidemia, mixed  Continue statin.  Diabetes mellitus, type 2 (HCC)  Lab Results   Component Value Date    HGBA1C 7.8 (H) 11/11/2024       Recent Labs     11/15/24  0722 11/15/24  1131 11/15/24  1538 11/16/24  0705   POCGLU 167* 202* 156* 142*       Blood Sugar Average: Last 72 hrs:  (P) 200  Holding home Jardiance/Glyxambi  Patient placed on sliding scale coverage with ACHS checks  Consistent carb diet  Hypoglycemia protocol.  Stage III chronic kidney disease (HCC)  Lab Results   Component Value Date    EGFR 53 11/16/2024    EGFR 53 11/15/2024    EGFR 63 11/14/2024    CREATININE 1.02 11/16/2024    CREATININE 1.02 11/15/2024    CREATININE 0.88 11/14/2024     Stable renal function  Noted slightly elevation of creatinine from her baseline 1.1-1.2  Suspected in the setting of bacteremia.  Continue with IV fluids and reassess BMP in the morning.  Avoid nephrotoxic agents and hypoperfusion.  Venous stasis dermatitis of both lower extremities  Venous duplex does not show any DVT  Hypo-osmolality and hyponatremia  Pseudohyponatremia, due to elevated blood glucose   Rash  Start benadyl cream  Avoid systemic benadryl for now unless symptoms not controlled to topical  Likely allergy based   Antibiotic switched to IV cefazolin from ceftriaxone  Primary hypertension  /58 (BP Location: Left arm)   Pulse 102   Temp 98.7 °F (37.1 °C) (Oral)   Resp 22   Ht 5' 4\" (1.626 m)   Wt 75.8 kg (167 lb)   SpO2 95%   BMI 28.67 kg/m²   Stable pressures  Persistent atrial fibrillation with RVR (HCC)  As above    VTE Pharmacologic Prophylaxis: VTE Score: 5 High Risk (Score " >/= 5) - Pharmacological DVT Prophylaxis Ordered: apixaban (Eliquis). Sequential Compression Devices Ordered.    Mobility:   Basic Mobility Inpatient Raw Score: 18  JH-HLM Goal: 6: Walk 10 steps or more  JH-HLM Achieved: 6: Walk 10 steps or more  JH-HLM Goal achieved. Continue to encourage appropriate mobility.    Patient Centered Rounds: I performed bedside rounds with nursing staff today.   Discussions with Specialists or Other Care Team Provider: cardiology, GI    Education and Discussions with Family / Patient: Updated  (son) via phone.    Current Length of Stay: 6 day(s)  Current Patient Status: Inpatient   Certification Statement: The patient will continue to require additional inpatient hospital stay due to see above  Discharge Plan: Anticipate discharge in 48 hrs to home with home services.    Code Status: Level 1 - Full Code    Subjective   Pt seen and examined at bedside during am rounds  No acute overnight events reported  Cont telemetry   No cp sob NVD or fever    Objective :  Temp:  [97.7 °F (36.5 °C)-99.1 °F (37.3 °C)] 98.7 °F (37.1 °C)  HR:  [] 102  BP: ()/(58-73) 109/58  Resp:  [17-33] 22  SpO2:  [94 %-100 %] 95 %  O2 Device: None (Room air)    Body mass index is 28.67 kg/m².     Input and Output Summary (last 24 hours):     Intake/Output Summary (Last 24 hours) at 11/16/2024 0849  Last data filed at 11/16/2024 0600  Gross per 24 hour   Intake 532.94 ml   Output 2201 ml   Net -1668.06 ml       Physical Exam  General- Awake, alert and oriented x 3, looks comfortable  HEENT- Normocephalic, atraumatic, oral mucosa- moist  Neck- Supple, No carotid bruit, no JVD  CVS- Normal S1/ S2, irregularly irregular, No murmur, No edema  Respiratory system- B/L clear breath sounds, no wheezing  Abdomen- Soft, Non distended, no tenderness, Bowel sound- present 4 quads  Genitourinary- No suprapubic tenderness, No CVA tenderness  Skin- No new bruise or rash  Musculoskeletal- No gross  deformity  Psych- No acute psychosis  CNS- CN II- XII grossly intact, No acute focal neurologic deficit noted      Lines/Drains:    Peripheral IV    Telemetry:  Telemetry Orders (From admission, onward)               24 Hour Telemetry Monitoring  Continuous x 24 Hours (Telem)        Question:  Reason for 24 Hour Telemetry  Answer:  Arrhythmias requiring acute medical intervention / PPM or ICD malfunction                     Telemetry Reviewed: Atrial fibrillation. HR averaging 100  Indication for Continued Telemetry Use: Arrthymias requiring medical therapy               Lab Results: I have reviewed the following results:   Results from last 7 days   Lab Units 11/16/24  0553 11/15/24  0910 11/14/24  0609   WBC Thousand/uL 12.24*   < > 12.23*   HEMOGLOBIN g/dL 11.0*   < > 9.3*   HEMATOCRIT % 34.5*   < > 29.3*   PLATELETS Thousands/uL 313   < > 200   SEGS PCT %  --   --  87*   LYMPHO PCT % 10*  --  6*   MONO PCT % 4  --  6   EOS PCT % 11*  --  0    < > = values in this interval not displayed.     Results from last 7 days   Lab Units 11/16/24  0553 11/12/24  0510 11/11/24  0552   SODIUM mmol/L 134*   < > 133*   POTASSIUM mmol/L 3.6   < > 3.5   CHLORIDE mmol/L 103   < > 100   CO2 mmol/L 25   < > 21   BUN mg/dL 13   < > 23   CREATININE mg/dL 1.02   < > 1.39*   ANION GAP mmol/L 6   < > 12   CALCIUM mg/dL 7.7*   < > 7.8*   ALBUMIN g/dL  --   --  3.4*   TOTAL BILIRUBIN mg/dL  --   --  1.14*   ALK PHOS U/L  --   --  68   ALT U/L  --   --  16   AST U/L  --   --  25   GLUCOSE RANDOM mg/dL 142*   < > 130    < > = values in this interval not displayed.         Results from last 7 days   Lab Units 11/16/24  0705 11/15/24  1538 11/15/24  1131 11/15/24  0722 11/15/24  0640 11/14/24  2133 11/14/24  1653 11/14/24  1116 11/14/24  0731 11/13/24  2026 11/13/24  1730 11/13/24  1435   POC GLUCOSE mg/dl 142* 156* 202* 167* 162* 185* 152* 197* 172* 304* 245* 303*     Results from last 7 days   Lab Units 11/11/24  0552   HEMOGLOBIN A1C %  7.8*     Results from last 7 days   Lab Units 11/14/24  0609 11/13/24  0431 11/11/24  0552 11/10/24  1421 11/10/24  1208   LACTIC ACID mmol/L  --   --   --  0.7  --    PROCALCITONIN ng/ml 8.12* 12.52* 41.52*  --  3.70*       Recent Cultures (last 7 days):   Results from last 7 days   Lab Units 11/10/24  2101 11/10/24  1421 11/10/24  1415   BLOOD CULTURE   --  No Growth After 5 Days. Streptococcus pyogenes (Group A)*   GRAM STAIN RESULT   --   --  Gram positive cocci in pairs and chains*   URINE CULTURE  No Growth <1000 cfu/mL  --   --        Imaging Results Review: No pertinent imaging studies reviewed.  Other Study Results Review: No additional pertinent studies reviewed.    Last 24 Hours Medication List:     Current Facility-Administered Medications:     acetaminophen (TYLENOL) tablet 650 mg, Q6H PRN    apixaban (ELIQUIS) tablet 5 mg, BID    ceFAZolin (ANCEF) IVPB (premix in dextrose) 2,000 mg 50 mL, Q8H, Last Rate: Stopped (11/16/24 0230)    diltiazem (CARDIZEM) 125 mg in sodium chloride 0.9 % 125 mL infusion, Titrated, Last Rate: 4 mg/hr (11/16/24 0556)    diphenhydrAMINE-zinc acetate (BENADRYL) 2-0.1 % cream, TID PRN    furosemide (LASIX) tablet 20 mg, Daily    insulin lispro (HumALOG/ADMELOG) 100 units/mL subcutaneous injection 1-5 Units, TID AC **AND** Fingerstick Glucose (POCT), TID AC    iron sucrose (VENOFER) 200 mg in sodium chloride 0.9 % 100 mL IVPB, Daily, Last Rate: 200 mg (11/16/24 0806)    levothyroxine tablet 125 mcg, Early Morning    metoprolol tartrate (LOPRESSOR) tablet 25 mg, Q8H    oxyCODONE (ROXICODONE) IR tablet 5 mg, Q6H PRN    pravastatin (PRAVACHOL) tablet 40 mg, Daily With Dinner    Administrative Statements   Today, Patient Was Seen By: Lex Dick MD  I have spent a total time of 50 minutes in caring for this patient on the day of the visit/encounter including Diagnostic results, Prognosis, Risks and benefits of tx options, Instructions for management, Patient and family education,  Importance of tx compliance, Risk factor reductions, Impressions, Counseling / Coordination of care, Documenting in the medical record, Reviewing / ordering tests, medicine, procedures  , Obtaining or reviewing history  , and Communicating with other healthcare professionals .    **Please Note: This note may have been constructed using a voice recognition system.**

## 2024-11-16 NOTE — PLAN OF CARE
Problem: PAIN - ADULT  Goal: Verbalizes/displays adequate comfort level or baseline comfort level  Description: Interventions:  - Encourage patient to monitor pain and request assistance  - Assess pain using appropriate pain scale  - Administer analgesics based on type and severity of pain and evaluate response  - Implement non-pharmacological measures as appropriate and evaluate response  - Consider cultural and social influences on pain and pain management  - Notify physician/advanced practitioner if interventions unsuccessful or patient reports new pain  Outcome: Progressing     Problem: INFECTION - ADULT  Goal: Absence or prevention of progression during hospitalization  Description: INTERVENTIONS:  - Assess and monitor for signs and symptoms of infection  - Monitor lab/diagnostic results  - Monitor all insertion sites, i.e. indwelling lines, tubes, and drains  - Administer medications as ordered  - Instruct and encourage patient and family to use good hand hygiene technique  - Identify and instruct in appropriate isolation precautions for identified infection/condition  Outcome: Progressing  Goal: Absence of fever/infection during neutropenic period  Description: INTERVENTIONS:  - Monitor WBC    Outcome: Progressing     Problem: SAFETY ADULT  Goal: Patient will remain free of falls  Description: INTERVENTIONS:  - Educate patient/family on patient safety including physical limitations  - Instruct patient to call for assistance with activity   - Consult OT/PT to assist with strengthening/mobility   - Keep Call bell within reach  - Keep bed low and locked with side rails adjusted as appropriate  - Keep care items and personal belongings within reach  - Initiate and maintain comfort rounds  - Make Fall Risk Sign visible to staff  - Offer Toileting every 2 Hours, in advance of need  - Initiate/Maintain bed alarm  - Apply yellow socks and bracelet for high fall risk patients  - Consider moving patient to room near  nurses station  Outcome: Progressing  Goal: Maintain or return to baseline ADL function  Description: INTERVENTIONS:  -  Assess patient's ability to carry out ADLs; assess patient's baseline for ADL function and identify physical deficits which impact ability to perform ADLs (bathing, care of mouth/teeth, toileting, grooming, dressing, etc.)  - Assess/evaluate cause of self-care deficits   - Assess range of motion  - Assess patient's mobility; develop plan if impaired  - Assess patient's need for assistive devices and provide as appropriate  - Encourage maximum independence but intervene and supervise when necessary  - Involve family in performance of ADLs  - Assess for home care needs following discharge   - Consider OT consult to assist with ADL evaluation and planning for discharge  - Provide patient education as appropriate  Outcome: Progressing  Goal: Maintains/Returns to pre admission functional level  Description: INTERVENTIONS:  - Perform AM-PAC 6 Click Basic Mobility/ Daily Activity assessment daily.  - Set and communicate daily mobility goal to care team and patient/family/caregiver.   - Collaborate with rehabilitation services on mobility goals if consulted  - Perform Range of Motion 3 times a day.  - Reposition patient every 2 hours.  - Dangle patient 3 times a day  - Stand patient 3 times a day  - Ambulate patient 3 times a day  - Out of bed to chair 3 times a day   - Out of bed for meals 3 times a day  - Out of bed for toileting  - Record patient progress and toleration of activity level   Outcome: Progressing     Problem: DISCHARGE PLANNING  Goal: Discharge to home or other facility with appropriate resources  Description: INTERVENTIONS:  - Identify barriers to discharge w/patient and caregiver  - Arrange for needed discharge resources and transportation as appropriate  - Identify discharge learning needs (meds, wound care, etc.)  - Arrange for interpretive services to assist at discharge as needed  -  Refer to Case Management Department for coordinating discharge planning if the patient needs post-hospital services based on physician/advanced practitioner order or complex needs related to functional status, cognitive ability, or social support system  Outcome: Progressing     Problem: Knowledge Deficit  Goal: Patient/family/caregiver demonstrates understanding of disease process, treatment plan, medications, and discharge instructions  Description: Complete learning assessment and assess knowledge base.  Interventions:  - Provide teaching at level of understanding  - Provide teaching via preferred learning methods  Outcome: Progressing     Problem: CARDIOVASCULAR - ADULT  Goal: Maintains optimal cardiac output and hemodynamic stability  Description: INTERVENTIONS:  - Monitor I/O, vital signs and rhythm  - Monitor for S/S and trends of decreased cardiac output  - Administer and titrate ordered vasoactive medications to optimize hemodynamic stability  - Assess quality of pulses, skin color and temperature  - Assess for signs of decreased coronary artery perfusion  - Instruct patient to report change in severity of symptoms  Outcome: Progressing  Goal: Absence of cardiac dysrhythmias or at baseline rhythm  Description: INTERVENTIONS:  - Continuous cardiac monitoring, vital signs, obtain 12 lead EKG if ordered  - Administer antiarrhythmic and heart rate control medications as ordered  - Cardizem drip initiated.   - Monitor electrolytes and administer replacement therapy as ordered  Outcome: Progressing

## 2024-11-16 NOTE — ASSESSMENT & PLAN NOTE
Lab Results   Component Value Date    EGFR 53 11/16/2024    EGFR 53 11/15/2024    EGFR 63 11/14/2024    CREATININE 1.02 11/16/2024    CREATININE 1.02 11/15/2024    CREATININE 0.88 11/14/2024     Stable renal function  Noted slightly elevation of creatinine from her baseline 1.1-1.2  Suspected in the setting of bacteremia.  Continue with IV fluids and reassess BMP in the morning.  Avoid nephrotoxic agents and hypoperfusion.

## 2024-11-16 NOTE — ASSESSMENT & PLAN NOTE
Longstanding persistent atrial fibrillation with RVR  Heart rates better controlled in the 80s to 100s   S/p IV digoxin, dig level ordered for tomorrow  Discontinue Cardizem drip and start p.o. Cardizem 30 mg every 6 hours  Continue Lopressor 25 mg every 8 hours.    Continue anticoagulation with Eliquis.  [id number].   Continue telemetry

## 2024-11-16 NOTE — ASSESSMENT & PLAN NOTE
Plan to treat for total of 14 days until 11/23/2024, transition to p.o. at discharge.  ID recommends Keflex 1 g every 6 hours.  Until then continue IV antibiotics  Patient had 1/2 blood culture positive for Streptococcus pyogenes, unclear source of infection. Likely Lower extremity wound  Per infectious disease recommending to continue with IV ceftriaxone  Recommended Doppler ultrasound to rule out septic thrombophlebitis

## 2024-11-16 NOTE — ASSESSMENT & PLAN NOTE
"/58 (BP Location: Left arm)   Pulse 102   Temp 98.7 °F (37.1 °C) (Oral)   Resp 22   Ht 5' 4\" (1.626 m)   Wt 75.8 kg (167 lb)   SpO2 95%   BMI 28.67 kg/m²   Stable pressures  "

## 2024-11-16 NOTE — PLAN OF CARE
Problem: PAIN - ADULT  Goal: Verbalizes/displays adequate comfort level or baseline comfort level  Description: Interventions:  - Encourage patient to monitor pain and request assistance  - Assess pain using appropriate pain scale  - Administer analgesics based on type and severity of pain and evaluate response  - Implement non-pharmacological measures as appropriate and evaluate response  - Consider cultural and social influences on pain and pain management  - Notify physician/advanced practitioner if interventions unsuccessful or patient reports new pain  Outcome: Progressing     Problem: INFECTION - ADULT  Goal: Absence or prevention of progression during hospitalization  Description: INTERVENTIONS:  - Assess and monitor for signs and symptoms of infection  - Monitor lab/diagnostic results  - Monitor all insertion sites, i.e. indwelling lines, tubes, and drains  - Administer medications as ordered  - Instruct and encourage patient and family to use good hand hygiene technique  - Identify and instruct in appropriate isolation precautions for identified infection/condition  Outcome: Progressing  Goal: Absence of fever/infection during neutropenic period  Description: INTERVENTIONS:  - Monitor WBC    Outcome: Progressing     Problem: SAFETY ADULT  Goal: Patient will remain free of falls  Description: INTERVENTIONS:  - Educate patient/family on patient safety including physical limitations  - Instruct patient to call for assistance with activity   - Consult OT/PT to assist with strengthening/mobility   - Keep Call bell within reach  - Keep bed low and locked with side rails adjusted as appropriate  - Keep care items and personal belongings within reach  - Initiate and maintain comfort rounds  - Make Fall Risk Sign visible to staff  - Offer Toileting every  Hours, in advance of need  - Initiate/Maintain alarm  - Obtain necessary fall risk management equipment:   - Apply yellow socks and bracelet for high fall risk  patients  - Consider moving patient to room near nurses station  Outcome: Progressing  Goal: Maintain or return to baseline ADL function  Description: INTERVENTIONS:  -  Assess patient's ability to carry out ADLs; assess patient's baseline for ADL function and identify physical deficits which impact ability to perform ADLs (bathing, care of mouth/teeth, toileting, grooming, dressing, etc.)  - Assess/evaluate cause of self-care deficits   - Assess range of motion  - Assess patient's mobility; develop plan if impaired  - Assess patient's need for assistive devices and provide as appropriate  - Encourage maximum independence but intervene and supervise when necessary  - Involve family in performance of ADLs  - Assess for home care needs following discharge   - Consider OT consult to assist with ADL evaluation and planning for discharge  - Provide patient education as appropriate  Outcome: Progressing  Goal: Maintains/Returns to pre admission functional level  Description: INTERVENTIONS:  - Perform AM-PAC 6 Click Basic Mobility/ Daily Activity assessment daily.  - Set and communicate daily mobility goal to care team and patient/family/caregiver.   - Collaborate with rehabilitation services on mobility goals if consulted  - Perform Range of Motion  times a day.  - Reposition patient every  hours.  - Dangle patient  times a day  - Stand patient  times a day  - Ambulate patient  times a day  - Out of bed to chair  times a day   - Out of bed for meals times a day  - Out of bed for toileting  - Record patient progress and toleration of activity level   Outcome: Progressing     Problem: DISCHARGE PLANNING  Goal: Discharge to home or other facility with appropriate resources  Description: INTERVENTIONS:  - Identify barriers to discharge w/patient and caregiver  - Arrange for needed discharge resources and transportation as appropriate  - Identify discharge learning needs (meds, wound care, etc.)  - Arrange for interpretive  services to assist at discharge as needed  - Refer to Case Management Department for coordinating discharge planning if the patient needs post-hospital services based on physician/advanced practitioner order or complex needs related to functional status, cognitive ability, or social support system  Outcome: Progressing     Problem: Knowledge Deficit  Goal: Patient/family/caregiver demonstrates understanding of disease process, treatment plan, medications, and discharge instructions  Description: Complete learning assessment and assess knowledge base.  Interventions:  - Provide teaching at level of understanding  - Provide teaching via preferred learning methods  Outcome: Progressing     Problem: CARDIOVASCULAR - ADULT  Goal: Maintains optimal cardiac output and hemodynamic stability  Description: INTERVENTIONS:  - Monitor I/O, vital signs and rhythm  - Monitor for S/S and trends of decreased cardiac output  - Administer and titrate ordered vasoactive medications to optimize hemodynamic stability  - Assess quality of pulses, skin color and temperature  - Assess for signs of decreased coronary artery perfusion  - Instruct patient to report change in severity of symptoms  Outcome: Progressing  Goal: Absence of cardiac dysrhythmias or at baseline rhythm  Description: INTERVENTIONS:  - Continuous cardiac monitoring, vital signs, obtain 12 lead EKG if ordered  - Administer antiarrhythmic and heart rate control medications as ordered  - Cardizem drip initiated.   - Monitor electrolytes and administer replacement therapy as ordered  Outcome: Progressing

## 2024-11-16 NOTE — ASSESSMENT & PLAN NOTE
Lab Results   Component Value Date    EGFR 53 11/16/2024    EGFR 53 11/15/2024    EGFR 63 11/14/2024    CREATININE 1.02 11/16/2024    CREATININE 1.02 11/15/2024    CREATININE 0.88 11/14/2024   Management per primary team

## 2024-11-16 NOTE — ASSESSMENT & PLAN NOTE
Lab Results   Component Value Date    HGBA1C 7.8 (H) 11/11/2024       Recent Labs     11/15/24  1131 11/15/24  1538 11/16/24  0705 11/16/24  1132   POCGLU 202* 156* 142* 167*       Blood Sugar Average: Last 72 hrs:  (P) 197.8  Management per primary team

## 2024-11-17 LAB
ANION GAP SERPL CALCULATED.3IONS-SCNC: 7 MMOL/L (ref 4–13)
BUN SERPL-MCNC: 13 MG/DL (ref 5–25)
CALCIUM SERPL-MCNC: 7.7 MG/DL (ref 8.4–10.2)
CHLORIDE SERPL-SCNC: 103 MMOL/L (ref 96–108)
CO2 SERPL-SCNC: 24 MMOL/L (ref 21–32)
CREAT SERPL-MCNC: 0.94 MG/DL (ref 0.6–1.3)
DIGOXIN SERPL-MCNC: 1.2 NG/ML (ref 0.8–2)
ERYTHROCYTE [DISTWIDTH] IN BLOOD BY AUTOMATED COUNT: 15 % (ref 11.6–15.1)
GFR SERPL CREATININE-BSD FRML MDRD: 58 ML/MIN/1.73SQ M
GLUCOSE SERPL-MCNC: 161 MG/DL (ref 65–140)
GLUCOSE SERPL-MCNC: 164 MG/DL (ref 65–140)
GLUCOSE SERPL-MCNC: 165 MG/DL (ref 65–140)
GLUCOSE SERPL-MCNC: 177 MG/DL (ref 65–140)
GLUCOSE SERPL-MCNC: 234 MG/DL (ref 65–140)
HCT VFR BLD AUTO: 35 % (ref 34.8–46.1)
HGB BLD-MCNC: 11.1 G/DL (ref 11.5–15.4)
MAGNESIUM SERPL-MCNC: 1.7 MG/DL (ref 1.9–2.7)
MCH RBC QN AUTO: 26.6 PG (ref 26.8–34.3)
MCHC RBC AUTO-ENTMCNC: 31.7 G/DL (ref 31.4–37.4)
MCV RBC AUTO: 84 FL (ref 82–98)
PLATELET # BLD AUTO: 355 THOUSANDS/UL (ref 149–390)
PMV BLD AUTO: 9.1 FL (ref 8.9–12.7)
POTASSIUM SERPL-SCNC: 3.4 MMOL/L (ref 3.5–5.3)
RBC # BLD AUTO: 4.18 MILLION/UL (ref 3.81–5.12)
SODIUM SERPL-SCNC: 134 MMOL/L (ref 135–147)
WBC # BLD AUTO: 13.86 THOUSAND/UL (ref 4.31–10.16)

## 2024-11-17 PROCEDURE — 99232 SBSQ HOSP IP/OBS MODERATE 35: CPT | Performed by: INTERNAL MEDICINE

## 2024-11-17 PROCEDURE — 85027 COMPLETE CBC AUTOMATED: CPT | Performed by: FAMILY MEDICINE

## 2024-11-17 PROCEDURE — 80048 BASIC METABOLIC PNL TOTAL CA: CPT | Performed by: FAMILY MEDICINE

## 2024-11-17 PROCEDURE — 80162 ASSAY OF DIGOXIN TOTAL: CPT

## 2024-11-17 PROCEDURE — 82948 REAGENT STRIP/BLOOD GLUCOSE: CPT

## 2024-11-17 PROCEDURE — 99233 SBSQ HOSP IP/OBS HIGH 50: CPT | Performed by: FAMILY MEDICINE

## 2024-11-17 PROCEDURE — 83735 ASSAY OF MAGNESIUM: CPT | Performed by: FAMILY MEDICINE

## 2024-11-17 RX ORDER — POTASSIUM CHLORIDE 1500 MG/1
40 TABLET, EXTENDED RELEASE ORAL ONCE
Status: COMPLETED | OUTPATIENT
Start: 2024-11-17 | End: 2024-11-17

## 2024-11-17 RX ORDER — MAGNESIUM SULFATE HEPTAHYDRATE 40 MG/ML
2 INJECTION, SOLUTION INTRAVENOUS ONCE
Status: COMPLETED | OUTPATIENT
Start: 2024-11-17 | End: 2024-11-17

## 2024-11-17 RX ORDER — FUROSEMIDE 10 MG/ML
20 INJECTION INTRAMUSCULAR; INTRAVENOUS ONCE
Status: DISCONTINUED | OUTPATIENT
Start: 2024-11-17 | End: 2024-11-17

## 2024-11-17 RX ORDER — FUROSEMIDE 10 MG/ML
20 INJECTION INTRAMUSCULAR; INTRAVENOUS
Status: DISCONTINUED | OUTPATIENT
Start: 2024-11-17 | End: 2024-11-18 | Stop reason: HOSPADM

## 2024-11-17 RX ORDER — INSULIN LISPRO 100 [IU]/ML
1-5 INJECTION, SOLUTION INTRAVENOUS; SUBCUTANEOUS
Status: DISCONTINUED | OUTPATIENT
Start: 2024-11-17 | End: 2024-11-18 | Stop reason: HOSPADM

## 2024-11-17 RX ORDER — INSULIN LISPRO 100 [IU]/ML
1-5 INJECTION, SOLUTION INTRAVENOUS; SUBCUTANEOUS
Status: DISCONTINUED | OUTPATIENT
Start: 2024-11-18 | End: 2024-11-18 | Stop reason: HOSPADM

## 2024-11-17 RX ADMIN — INSULIN LISPRO 1 UNITS: 100 INJECTION, SOLUTION INTRAVENOUS; SUBCUTANEOUS at 08:30

## 2024-11-17 RX ADMIN — CEFAZOLIN SODIUM 2000 MG: 2 SOLUTION INTRAVENOUS at 10:53

## 2024-11-17 RX ADMIN — DILTIAZEM HYDROCHLORIDE 30 MG: 30 TABLET ORAL at 17:30

## 2024-11-17 RX ADMIN — PANTOPRAZOLE SODIUM 40 MG: 40 TABLET, DELAYED RELEASE ORAL at 05:19

## 2024-11-17 RX ADMIN — METOPROLOL TARTRATE 25 MG: 25 TABLET, FILM COATED ORAL at 16:41

## 2024-11-17 RX ADMIN — INSULIN LISPRO 2 UNITS: 100 INJECTION, SOLUTION INTRAVENOUS; SUBCUTANEOUS at 22:01

## 2024-11-17 RX ADMIN — MAGNESIUM SULFATE HEPTAHYDRATE 2 G: 40 INJECTION, SOLUTION INTRAVENOUS at 10:54

## 2024-11-17 RX ADMIN — LEVOTHYROXINE SODIUM 125 MCG: 125 TABLET ORAL at 05:19

## 2024-11-17 RX ADMIN — APIXABAN 5 MG: 5 TABLET, FILM COATED ORAL at 17:30

## 2024-11-17 RX ADMIN — FUROSEMIDE 20 MG: 20 TABLET ORAL at 08:29

## 2024-11-17 RX ADMIN — INSULIN LISPRO 1 UNITS: 100 INJECTION, SOLUTION INTRAVENOUS; SUBCUTANEOUS at 16:40

## 2024-11-17 RX ADMIN — PRAVASTATIN SODIUM 40 MG: 40 TABLET ORAL at 16:41

## 2024-11-17 RX ADMIN — DILTIAZEM HYDROCHLORIDE 30 MG: 30 TABLET ORAL at 05:19

## 2024-11-17 RX ADMIN — POTASSIUM CHLORIDE 40 MEQ: 1500 TABLET, EXTENDED RELEASE ORAL at 11:01

## 2024-11-17 RX ADMIN — INSULIN LISPRO 1 UNITS: 100 INJECTION, SOLUTION INTRAVENOUS; SUBCUTANEOUS at 12:22

## 2024-11-17 RX ADMIN — DILTIAZEM HYDROCHLORIDE 30 MG: 30 TABLET ORAL at 00:39

## 2024-11-17 RX ADMIN — IRON SUCROSE 200 MG: 20 INJECTION, SOLUTION INTRAVENOUS at 08:31

## 2024-11-17 RX ADMIN — DILTIAZEM HYDROCHLORIDE 30 MG: 30 TABLET ORAL at 12:21

## 2024-11-17 RX ADMIN — DILTIAZEM HYDROCHLORIDE 30 MG: 30 TABLET ORAL at 23:27

## 2024-11-17 RX ADMIN — CEFAZOLIN SODIUM 2000 MG: 2 SOLUTION INTRAVENOUS at 18:27

## 2024-11-17 RX ADMIN — CEFAZOLIN SODIUM 2000 MG: 2 SOLUTION INTRAVENOUS at 02:34

## 2024-11-17 RX ADMIN — METOPROLOL TARTRATE 25 MG: 25 TABLET, FILM COATED ORAL at 08:31

## 2024-11-17 RX ADMIN — FUROSEMIDE 20 MG: 10 INJECTION, SOLUTION INTRAMUSCULAR; INTRAVENOUS at 16:40

## 2024-11-17 RX ADMIN — METOPROLOL TARTRATE 25 MG: 25 TABLET, FILM COATED ORAL at 00:41

## 2024-11-17 RX ADMIN — APIXABAN 5 MG: 5 TABLET, FILM COATED ORAL at 08:30

## 2024-11-17 NOTE — ASSESSMENT & PLAN NOTE
resolved  Blood culture: GAS  ID following  Currently on cefazolin, was on ceftriaxone but developed rash with it and improved with cefazolin  Procal downtrending  Leukocytosis could be reactive to RVR, will continue to monitor

## 2024-11-17 NOTE — ASSESSMENT & PLAN NOTE
Lab Results   Component Value Date    EGFR 58 11/17/2024    EGFR 53 11/16/2024    EGFR 53 11/15/2024    CREATININE 0.94 11/17/2024    CREATININE 1.02 11/16/2024    CREATININE 1.02 11/15/2024     Stable renal function  Noted slightly elevation of creatinine from her baseline 1.1-1.2  Suspected in the setting of bacteremia.  Continue with IV fluids and reassess BMP in the morning.  Avoid nephrotoxic agents and hypoperfusion.

## 2024-11-17 NOTE — PLAN OF CARE
Problem: PAIN - ADULT  Goal: Verbalizes/displays adequate comfort level or baseline comfort level  Description: Interventions:  - Encourage patient to monitor pain and request assistance  - Assess pain using appropriate pain scale  - Administer analgesics based on type and severity of pain and evaluate response  - Implement non-pharmacological measures as appropriate and evaluate response  - Consider cultural and social influences on pain and pain management  - Notify physician/advanced practitioner if interventions unsuccessful or patient reports new pain  Outcome: Progressing     Problem: INFECTION - ADULT  Goal: Absence of fever/infection during neutropenic period  Description: INTERVENTIONS:  - Monitor WBC    Outcome: Progressing     Problem: SAFETY ADULT  Goal: Maintain or return to baseline ADL function  Description: INTERVENTIONS:  -  Assess patient's ability to carry out ADLs; assess patient's baseline for ADL function and identify physical deficits which impact ability to perform ADLs (bathing, care of mouth/teeth, toileting, grooming, dressing, etc.)  - Assess/evaluate cause of self-care deficits   - Assess range of motion  - Assess patient's mobility; develop plan if impaired  - Assess patient's need for assistive devices and provide as appropriate  - Encourage maximum independence but intervene and supervise when necessary  - Involve family in performance of ADLs  - Assess for home care needs following discharge   - Consider OT consult to assist with ADL evaluation and planning for discharge  - Provide patient education as appropriate  Outcome: Progressing     Problem: DISCHARGE PLANNING  Goal: Discharge to home or other facility with appropriate resources  Description: INTERVENTIONS:  - Identify barriers to discharge w/patient and caregiver  - Arrange for needed discharge resources and transportation as appropriate  - Identify discharge learning needs (meds, wound care, etc.)  - Arrange for  interpretive services to assist at discharge as needed  - Refer to Case Management Department for coordinating discharge planning if the patient needs post-hospital services based on physician/advanced practitioner order or complex needs related to functional status, cognitive ability, or social support system  Outcome: Progressing     Problem: Knowledge Deficit  Goal: Patient/family/caregiver demonstrates understanding of disease process, treatment plan, medications, and discharge instructions  Description: Complete learning assessment and assess knowledge base.  Interventions:  - Provide teaching at level of understanding  - Provide teaching via preferred learning methods  Outcome: Progressing     Problem: CARDIOVASCULAR - ADULT  Goal: Maintains optimal cardiac output and hemodynamic stability  Description: INTERVENTIONS:  - Monitor I/O, vital signs and rhythm  - Monitor for S/S and trends of decreased cardiac output  - Administer and titrate ordered vasoactive medications to optimize hemodynamic stability  - Assess quality of pulses, skin color and temperature  - Assess for signs of decreased coronary artery perfusion  - Instruct patient to report change in severity of symptoms  Outcome: Progressing

## 2024-11-17 NOTE — PROGRESS NOTES
Progress Note - Cardiology   Name: Jenny Galarza 79 y.o. female I MRN: [id number]  Unit/Bed#: ICU 07 I Date of Admission: 11/10/2024   Date of Service: 11/17/2024 I Hospital Day: 7    Assessment & Plan  Persistent atrial fibrillation with RVR (HCC)  Longstanding persistent atrial fibrillation with RVR  Heart rates better controlled in the 80s  S/p IV digoxin, dig level 1.2 today   continue Cardizem 30 mg every 6 hours and  Lopressor 25 mg every 8 hours    Continue anticoagulation with Eliquis.  [id number].   Continue telemetry    Streptococcal bacteremia  Sepsis/bacteremia  Management per ID/primary team.  Suspected secondary to venous stasis dermatitis/lower extremity skin breaks.  On IV antibiotics.    Anemia  Management per primary team    Primary hypertension  Continue Lasix, Lopressor, Cardizem   Continue to monitor BPs closely.    Hyperlipidemia, mixed  Continue statin    Stage III chronic kidney disease ([facility])  Lab Results   Component Value Date    EGFR 58 11/17/2024    EGFR 53 11/16/2024    EGFR 53 11/15/2024    CREATININE 0.94 11/17/2024    CREATININE 1.02 11/16/2024    CREATININE 1.02 11/15/2024   Management per primary team    Diabetes mellitus, type 2 ([facility])  Lab Results   Component Value Date    HGBA1C 7.8 (H) 11/11/2024       Recent Labs     11/16/24  0705 11/16/24  1132 11/16/24  1547 11/17/24  0659   POCGLU 142* 167* 178* 177*       Blood Sugar Average: Last 72 hrs:  (P) 171.9881371695818746  Management per primary team    Venous stasis dermatitis of both lower extremities  Management per primary team/ID  Patient with significant lower extremity today, start IV Lasix 20 mg twice daily      Subjective   Chief Complaint: Feeling better, but legs are swollen      Objective :  Temp:  [98 °F (36.7 °C)-98.9 °F (37.2 °C)] 98 °F (36.7 °C)  HR:  [] 87  BP: (109-138)/(56-69) 112/57  Resp:  [14-25] 16  SpO2:  [95 %-100 %] 95 %  O2 Device: None (Room air)  Orthostatic Blood Pressures      Flowsheet  Row Most Recent Value   Blood Pressure 112/57 filed at 11/17/2024 0700   Patient Position - Orthostatic VS Lying filed at 11/17/2024 0700          First Weight: Weight - Scale: 76 kg (167 lb 8.8 oz) (11/10/24 1757)  Vitals:    11/10/24 1757 11/13/24 1041   Weight: 76 kg (167 lb 8.8 oz) 75.8 kg (167 lb)     Physical Exam  Vitals and nursing note reviewed.   Constitutional:       General: She is not in acute distress.     Appearance: She is well-developed.   HENT:      Head: Normocephalic and atraumatic.   Eyes:      Conjunctiva/sclera: Conjunctivae normal.   Cardiovascular:      Rate and Rhythm: Rhythm irregular.      Heart sounds: No murmur heard.  Pulmonary:      Effort: Pulmonary effort is normal. No respiratory distress.      Breath sounds: Normal breath sounds.   Abdominal:      Palpations: Abdomen is soft.      Tenderness: There is no abdominal tenderness.   Musculoskeletal:         General: No swelling.      Cervical back: Neck supple.      Right lower leg: Edema present.      Left lower leg: Edema present.   Skin:     General: Skin is warm and dry.      Capillary Refill: Capillary refill takes less than 2 seconds.   Neurological:      Mental Status: She is alert.   Psychiatric:         Mood and Affect: Mood normal.           Lab Results: I have reviewed the following results:Creatinine Clearance: Estimated Creatinine Clearance: 49.9 mL/min (by C-G formula based on SCr of 0.94 mg/dL)., Lipid Profile:   , TSH:     Results from last 7 days   Lab Units 11/17/24  0514 11/16/24  0553 11/15/24  0910   WBC Thousand/uL 13.86* 12.24* 13.45*   HEMOGLOBIN g/dL 11.1* 11.0* 10.1*   HEMATOCRIT % 35.0 34.5* 31.8*   PLATELETS Thousands/uL 355 313 276     Results from last 7 days   Lab Units 11/17/24  0514 11/16/24  0553 11/15/24  0910   POTASSIUM mmol/L 3.4* 3.6 3.6   CHLORIDE mmol/L 103 103 101   CO2 mmol/L 24 25 22   BUN mg/dL 13 13 13   CREATININE mg/dL 0.94 1.02 1.02   CALCIUM mg/dL 7.7* 7.7* 7.9*         Lab Results    Component Value Date    HGBA1C 7.8 (H) 11/11/2024     Lab Results   Component Value Date    TROPONINI <0.02 11/13/2019

## 2024-11-17 NOTE — ASSESSMENT & PLAN NOTE
"/57 (BP Location: Left arm)   Pulse 87   Temp 98 °F (36.7 °C) (Oral)   Resp 16   Ht 5' 4\" (1.626 m)   Wt 75.8 kg (167 lb)   SpO2 95%   BMI 28.67 kg/m²   RVR noted on tele this am, on digoxin now s/p load   Previously holding patient's home Toprol-XL/diltiazem due to hypotension- likely reason for rapid afib  Continued to be in rapid A-fib even after starting her on p.o. Cardizem 60 mg every 6 hours  Patient now off of Cardizem drip and started on 30 mg every 6 hours of p.o. Cardizem . S/p dig load  Monitor blood pressures closely  continue Lopressor 25 mg every 8 hours and follow cardiology recommendations  Monitor on tele  Keep potassium close to 4 and mag 2  Continue Eliquis.  "

## 2024-11-17 NOTE — ASSESSMENT & PLAN NOTE
"/57 (BP Location: Left arm)   Pulse 87   Temp 98 °F (36.7 °C) (Oral)   Resp 16   Ht 5' 4\" (1.626 m)   Wt 75.8 kg (167 lb)   SpO2 95%   BMI 28.67 kg/m²   Stable pressures  "

## 2024-11-17 NOTE — ASSESSMENT & PLAN NOTE
Lab Results   Component Value Date    HGBA1C 7.8 (H) 11/11/2024       Recent Labs     11/16/24  0705 11/16/24  1132 11/16/24  1547 11/17/24  0659   POCGLU 142* 167* 178* 177*       Blood Sugar Average: Last 72 hrs:  (P) 171.7731117138446118  Management per primary team

## 2024-11-17 NOTE — ASSESSMENT & PLAN NOTE
Lab Results   Component Value Date    HGBA1C 7.8 (H) 11/11/2024       Recent Labs     11/16/24  0705 11/16/24  1132 11/16/24  1547 11/17/24  0659   POCGLU 142* 167* 178* 177*       Blood Sugar Average: Last 72 hrs:  (P) 171.9115889156441204  Holding home Jardiance/Glyxambi  Patient placed on sliding scale coverage with ACHS checks  Consistent carb diet  Hypoglycemia protocol.

## 2024-11-17 NOTE — ASSESSMENT & PLAN NOTE
Management per primary team/ID  Patient with significant lower extremity today, start IV Lasix 20 mg twice daily

## 2024-11-17 NOTE — ASSESSMENT & PLAN NOTE
Lab Results   Component Value Date    EGFR 58 11/17/2024    EGFR 53 11/16/2024    EGFR 53 11/15/2024    CREATININE 0.94 11/17/2024    CREATININE 1.02 11/16/2024    CREATININE 1.02 11/15/2024   Management per primary team

## 2024-11-17 NOTE — PROGRESS NOTES
"Progress Note - Hospitalist   Name: Jenny Galarza 79 y.o. female I MRN: [id number]  Unit/Bed#: ICU 07 I Date of Admission: 11/10/2024   Date of Service: 11/17/2024 I Hospital Day: 7    Assessment & Plan  Sepsis (HCC)  resolved  Blood culture: GAS  ID following  Currently on cefazolin, was on ceftriaxone but developed rash with it and improved with cefazolin  Procal downtrending  Leukocytosis could be reactive to RVR, will continue to monitor  Streptococcal bacteremia  Plan to treat for total of 14 days until 11/23/2024, transition to p.o. at discharge.  ID recommends Keflex 1 g every 6 hours.  Until then continue IV antibiotics  Patient had 1/2 blood culture positive for Streptococcus pyogenes, unclear source of infection. Likely Lower extremity wound  Per infectious disease recommending to continue with IV ceftriaxone  Recommended Doppler ultrasound to rule out septic thrombophlebitis    Paroxysmal atrial fibrillation (HCC) in RVR  /57 (BP Location: Left arm)   Pulse 87   Temp 98 °F (36.7 °C) (Oral)   Resp 16   Ht 5' 4\" (1.626 m)   Wt 75.8 kg (167 lb)   SpO2 95%   BMI 28.67 kg/m²   RVR noted on tele this am, on digoxin now s/p load   Previously holding patient's home Toprol-XL/diltiazem due to hypotension- likely reason for rapid afib  Continued to be in rapid A-fib even after starting her on p.o. Cardizem 60 mg every 6 hours  Patient now off of Cardizem drip and started on 30 mg every 6 hours of p.o. Cardizem . S/p dig load  Monitor blood pressures closely  continue Lopressor 25 mg every 8 hours and follow cardiology recommendations  Monitor on tele  Keep potassium close to 4 and mag 2  Continue Eliquis.  Anemia  iron panel shows iron level of <10, profound BRITTON- start IV venofer- cleared by ID   FOBT is positive  GI recommends outpatient endoscopy and has signed off  Has hemorrhoids, and does bleed off and on which can lead to chronic iron deficiency    Muscle weakness (generalized)  Coming in with " "generalized weakness/poor oral intake/lightheadedness/chills  Patient also noted to be hypotensive in the ER with systolic blood pressure readings in the 90s which is now improved  Patient receiving empiric IV fluids/IV antibiotics  Hold home antihypertensive agents.  PT OT shalondaal requested  Hypothyroidism  Continue with Synthroid  Hyperlipidemia, mixed  Continue statin.  Diabetes mellitus, type 2 (HCC)  Lab Results   Component Value Date    HGBA1C 7.8 (H) 11/11/2024       Recent Labs     11/16/24  0705 11/16/24  1132 11/16/24  1547 11/17/24  0659   POCGLU 142* 167* 178* 177*       Blood Sugar Average: Last 72 hrs:  (P) 171.8837438135969122  Holding home Jardiance/Glyxambi  Patient placed on sliding scale coverage with ACHS checks  Consistent carb diet  Hypoglycemia protocol.  Stage III chronic kidney disease ([facility])  Lab Results   Component Value Date    EGFR 58 11/17/2024    EGFR 53 11/16/2024    EGFR 53 11/15/2024    CREATININE 0.94 11/17/2024    CREATININE 1.02 11/16/2024    CREATININE 1.02 11/15/2024     Stable renal function  Noted slightly elevation of creatinine from her baseline 1.1-1.2  Suspected in the setting of bacteremia.  Continue with IV fluids and reassess BMP in the morning.  Avoid nephrotoxic agents and hypoperfusion.  Venous stasis dermatitis of both lower extremities  Venous duplex does not show any DVT  Hypo-osmolality and hyponatremia  Pseudohyponatremia, due to elevated blood glucose   Rash  Start benadyl cream  Avoid systemic benadryl for now unless symptoms not controlled to topical  Likely allergy based   Antibiotic switched to IV cefazolin from ceftriaxone  Primary hypertension  /57 (BP Location: Left arm)   Pulse 87   Temp 98 °F (36.7 °C) (Oral)   Resp 16   Ht 5' 4\" (1.626 m)   Wt 75.8 kg (167 lb)   SpO2 95%   BMI 28.67 kg/m²   Stable pressures  Persistent atrial fibrillation with RVR (HCC)  As above    VTE Pharmacologic Prophylaxis: VTE Score: 5 High Risk (Score >/= 5) - " Pharmacological DVT Prophylaxis Ordered: apixaban (Eliquis). Sequential Compression Devices Ordered.    Mobility:   Basic Mobility Inpatient Raw Score: 19  JH-HLM Goal: 6: Walk 10 steps or more  JH-HLM Achieved: 6: Walk 10 steps or more  JH-HLM Goal achieved. Continue to encourage appropriate mobility.    Patient Centered Rounds: I performed bedside rounds with nursing staff today.   Discussions with Specialists or Other Care Team Provider: cardiology    Education and Discussions with Family / Patient: Updated  (son) via phone.    Current Length of Stay: 7 day(s)  Current Patient Status: Inpatient   Certification Statement: The patient will continue to require additional inpatient hospital stay due to IV lasix  Discharge Plan: Anticipate discharge in 24-48 hrs to home.    Code Status: Level 1 - Full Code    Subjective   Pt seen and examined at bedside during AM rounds  Leg swelling noted  No pain  No sob or cp or palpitations  No acute overnight events    Objective :  Temp:  [98 °F (36.7 °C)-98.9 °F (37.2 °C)] 98 °F (36.7 °C)  HR:  [] 87  BP: (109-138)/(56-69) 112/57  Resp:  [14-25] 16  SpO2:  [95 %-100 %] 95 %  O2 Device: None (Room air)    Body mass index is 28.67 kg/m².     Input and Output Summary (last 24 hours):     Intake/Output Summary (Last 24 hours) at 11/17/2024 1133  Last data filed at 11/16/2024 1835  Gross per 24 hour   Intake 461 ml   Output 800 ml   Net -339 ml       Physical Exam  General- Awake, alert and oriented x 3, looks comfortable  HEENT- Normocephalic, atraumatic, oral mucosa- moist  Neck- Supple, No carotid bruit, no JVD  CVS- Normal S1/ S2, irregular, No murmur, +2-3 pitting pedal edema above the calf area as she has SCDs on  Respiratory system- B/L clear breath sounds, no wheezing  Abdomen- Soft, Non distended, no tenderness, Bowel sound- present 4 quads  Genitourinary- No suprapubic tenderness, No CVA tenderness  Skin- No new bruise or rash  Musculoskeletal- No gross  deformity  Psych- No acute psychosis  CNS- CN II- XII grossly intact, No acute focal neurologic deficit noted      Lines/Drains:        Telemetry:  Telemetry Orders (From admission, onward)               24 Hour Telemetry Monitoring  Continuous x 24 Hours (Telem)        Question:  Reason for 24 Hour Telemetry  Answer:  Arrhythmias requiring acute medical intervention / PPM or ICD malfunction                     Telemetry Reviewed: Atrial fibrillation. HR averaging   Indication for Continued Telemetry Use: Arrthymias requiring medical therapy               Lab Results: I have reviewed the following results:   Results from last 7 days   Lab Units 11/17/24  0514 11/16/24  0553 11/15/24  0910 11/14/24  0609   WBC Thousand/uL 13.86* 12.24*   < > 12.23*   HEMOGLOBIN g/dL 11.1* 11.0*   < > 9.3*   HEMATOCRIT % 35.0 34.5*   < > 29.3*   PLATELETS Thousands/uL 355 313   < > 200   SEGS PCT %  --   --   --  87*   LYMPHO PCT %  --  10*  --  6*   MONO PCT %  --  4  --  6   EOS PCT %  --  11*  --  0    < > = values in this interval not displayed.     Results from last 7 days   Lab Units 11/17/24  0514 11/12/24  0510 11/11/24  0552   SODIUM mmol/L 134*   < > 133*   POTASSIUM mmol/L 3.4*   < > 3.5   CHLORIDE mmol/L 103   < > 100   CO2 mmol/L 24   < > 21   BUN mg/dL 13   < > 23   CREATININE mg/dL 0.94   < > 1.39*   ANION GAP mmol/L 7   < > 12   CALCIUM mg/dL 7.7*   < > 7.8*   ALBUMIN g/dL  --   --  3.4*   TOTAL BILIRUBIN mg/dL  --   --  1.14*   ALK PHOS U/L  --   --  68   ALT U/L  --   --  16   AST U/L  --   --  25   GLUCOSE RANDOM mg/dL 165*   < > 130    < > = values in this interval not displayed.         Results from last 7 days   Lab Units 11/17/24  0659 11/16/24  1547 11/16/24  1132 11/16/24  0705 11/15/24  1538 11/15/24  1131 11/15/24  0722 11/15/24  0640 11/14/24  2133 11/14/24  1653 11/14/24  1116 11/14/24  0731   POC GLUCOSE mg/dl 177* 178* 167* 142* 156* 202* 167* 162* 185* 152* 197* 172*     Results from last 7 days    Lab Units 11/11/24  0552   HEMOGLOBIN A1C % 7.8*     Results from last 7 days   Lab Units 11/14/24  0609 11/13/24  0431 11/11/24  0552 11/10/24  1421 11/10/24  1208   LACTIC ACID mmol/L  --   --   --  0.7  --    PROCALCITONIN ng/ml 8.12* 12.52* 41.52*  --  3.70*       Recent Cultures (last 7 days):   Results from last 7 days   Lab Units 11/10/24  2101 11/10/24  1421 11/10/24  1415   BLOOD CULTURE   --  No Growth After 5 Days. Streptococcus pyogenes (Group A)*   GRAM STAIN RESULT   --   --  Gram positive cocci in pairs and chains*   URINE CULTURE  No Growth <1000 cfu/mL  --   --        Imaging Results Review: No pertinent imaging studies reviewed.  Other Study Results Review: No additional pertinent studies reviewed.    Last 24 Hours Medication List:     Current Facility-Administered Medications:     acetaminophen (TYLENOL) tablet 650 mg, Q6H PRN    apixaban (ELIQUIS) tablet 5 mg, BID    ceFAZolin (ANCEF) IVPB (premix in dextrose) 2,000 mg 50 mL, Q8H, Last Rate: 2,000 mg (11/17/24 1053)    diltiazem (CARDIZEM) tablet 30 mg, Q6H JACKY    diphenhydrAMINE-zinc acetate (BENADRYL) 2-0.1 % cream, TID PRN    furosemide (LASIX) injection 20 mg, BID (diuretic)    insulin lispro (HumALOG/ADMELOG) 100 units/mL subcutaneous injection 1-5 Units, TID AC **AND** Fingerstick Glucose (POCT), TID AC    levothyroxine tablet 125 mcg, Early Morning    magnesium sulfate 2 g/50 mL IVPB (premix) 2 g, Once, Last Rate: 2 g (11/17/24 1054)    metoprolol tartrate (LOPRESSOR) tablet 25 mg, Q8H    oxyCODONE (ROXICODONE) IR tablet 5 mg, Q6H PRN    pantoprazole (PROTONIX) EC tablet 40 mg, Early Morning    pravastatin (PRAVACHOL) tablet 40 mg, Daily With Dinner    Administrative Statements   Today, Patient Was Seen By: Lex Dick MD  I have spent a total time of 50 minutes in caring for this patient on the day of the visit/encounter including Diagnostic results, Prognosis, Risks and benefits of tx options, Instructions for management, Patient and  family education, Importance of tx compliance, Risk factor reductions, Impressions, Counseling / Coordination of care, Documenting in the medical record, Reviewing / ordering tests, medicine, procedures  , Obtaining or reviewing history  , and Communicating with other healthcare professionals .    **Please Note: This note may have been constructed using a voice recognition system.**

## 2024-11-17 NOTE — ASSESSMENT & PLAN NOTE
Longstanding persistent atrial fibrillation with RVR  Heart rates better controlled in the 80s  S/p IV digoxin, dig level 1.2 today   continue Cardizem 30 mg every 6 hours and  Lopressor 25 mg every 8 hours    Continue anticoagulation with Eliquis.  [id number] 5.   Continue telemetry

## 2024-11-18 VITALS
BODY MASS INDEX: 30.94 KG/M2 | TEMPERATURE: 97.7 F | HEART RATE: 74 BPM | OXYGEN SATURATION: 100 % | WEIGHT: 181.22 LBS | DIASTOLIC BLOOD PRESSURE: 65 MMHG | HEIGHT: 64 IN | RESPIRATION RATE: 16 BRPM | SYSTOLIC BLOOD PRESSURE: 120 MMHG

## 2024-11-18 LAB
ANION GAP SERPL CALCULATED.3IONS-SCNC: 8 MMOL/L (ref 4–13)
BUN SERPL-MCNC: 13 MG/DL (ref 5–25)
CALCIUM SERPL-MCNC: 7.8 MG/DL (ref 8.4–10.2)
CHLORIDE SERPL-SCNC: 104 MMOL/L (ref 96–108)
CO2 SERPL-SCNC: 25 MMOL/L (ref 21–32)
CREAT SERPL-MCNC: 0.87 MG/DL (ref 0.6–1.3)
DME PARACHUTE DELIVERY DATE ACTUAL: NORMAL
DME PARACHUTE DELIVERY DATE REQUESTED: NORMAL
DME PARACHUTE ITEM DESCRIPTION: NORMAL
DME PARACHUTE ORDER STATUS: NORMAL
DME PARACHUTE SUPPLIER NAME: NORMAL
DME PARACHUTE SUPPLIER PHONE: NORMAL
ERYTHROCYTE [DISTWIDTH] IN BLOOD BY AUTOMATED COUNT: 15.2 % (ref 11.6–15.1)
GFR SERPL CREATININE-BSD FRML MDRD: 64 ML/MIN/1.73SQ M
GLUCOSE SERPL-MCNC: 156 MG/DL (ref 65–140)
GLUCOSE SERPL-MCNC: 165 MG/DL (ref 65–140)
GLUCOSE SERPL-MCNC: 181 MG/DL (ref 65–140)
HCT VFR BLD AUTO: 34.7 % (ref 34.8–46.1)
HGB BLD-MCNC: 10.8 G/DL (ref 11.5–15.4)
MAGNESIUM SERPL-MCNC: 1.7 MG/DL (ref 1.9–2.7)
MCH RBC QN AUTO: 26 PG (ref 26.8–34.3)
MCHC RBC AUTO-ENTMCNC: 31.1 G/DL (ref 31.4–37.4)
MCV RBC AUTO: 83 FL (ref 82–98)
PLATELET # BLD AUTO: 348 THOUSANDS/UL (ref 149–390)
PMV BLD AUTO: 8.6 FL (ref 8.9–12.7)
POTASSIUM SERPL-SCNC: 3.4 MMOL/L (ref 3.5–5.3)
QRS AXIS: 12 DEGREES
QRS AXIS: 17 DEGREES
QRSD INTERVAL: 84 MS
QRSD INTERVAL: 88 MS
QT INTERVAL: 386 MS
QT INTERVAL: 422 MS
QTC INTERVAL: 429 MS
QTC INTERVAL: 478 MS
RBC # BLD AUTO: 4.16 MILLION/UL (ref 3.81–5.12)
SODIUM SERPL-SCNC: 137 MMOL/L (ref 135–147)
T WAVE AXIS: 55 DEGREES
T WAVE AXIS: 73 DEGREES
VENTRICULAR RATE: 74 BPM
VENTRICULAR RATE: 77 BPM
WBC # BLD AUTO: 13.5 THOUSAND/UL (ref 4.31–10.16)

## 2024-11-18 PROCEDURE — 82948 REAGENT STRIP/BLOOD GLUCOSE: CPT

## 2024-11-18 PROCEDURE — 83735 ASSAY OF MAGNESIUM: CPT | Performed by: FAMILY MEDICINE

## 2024-11-18 PROCEDURE — 99239 HOSP IP/OBS DSCHRG MGMT >30: CPT | Performed by: FAMILY MEDICINE

## 2024-11-18 PROCEDURE — 85027 COMPLETE CBC AUTOMATED: CPT | Performed by: FAMILY MEDICINE

## 2024-11-18 PROCEDURE — 80048 BASIC METABOLIC PNL TOTAL CA: CPT | Performed by: FAMILY MEDICINE

## 2024-11-18 PROCEDURE — 93010 ELECTROCARDIOGRAM REPORT: CPT | Performed by: STUDENT IN AN ORGANIZED HEALTH CARE EDUCATION/TRAINING PROGRAM

## 2024-11-18 PROCEDURE — 99232 SBSQ HOSP IP/OBS MODERATE 35: CPT | Performed by: STUDENT IN AN ORGANIZED HEALTH CARE EDUCATION/TRAINING PROGRAM

## 2024-11-18 PROCEDURE — 93005 ELECTROCARDIOGRAM TRACING: CPT

## 2024-11-18 RX ORDER — FUROSEMIDE 20 MG/1
20 TABLET ORAL DAILY
Qty: 30 TABLET | Refills: 0 | Status: SHIPPED | OUTPATIENT
Start: 2024-11-18 | End: 2024-12-18

## 2024-11-18 RX ORDER — PANTOPRAZOLE SODIUM 40 MG/1
40 TABLET, DELAYED RELEASE ORAL
Qty: 30 TABLET | Refills: 0 | Status: SHIPPED | OUTPATIENT
Start: 2024-11-19

## 2024-11-18 RX ORDER — DIPHENHYDRAMINE HYDROCHLORIDE, ZINC ACETATE 2; .1 G/100G; G/100G
CREAM TOPICAL 3 TIMES DAILY PRN
Qty: 15 G | Refills: 0 | Status: SHIPPED | OUTPATIENT
Start: 2024-11-18

## 2024-11-18 RX ORDER — POTASSIUM CHLORIDE 1500 MG/1
40 TABLET, EXTENDED RELEASE ORAL ONCE
Status: COMPLETED | OUTPATIENT
Start: 2024-11-18 | End: 2024-11-18

## 2024-11-18 RX ORDER — DILTIAZEM HYDROCHLORIDE 180 MG/1
180 CAPSULE, COATED, EXTENDED RELEASE ORAL DAILY
Status: DISCONTINUED | OUTPATIENT
Start: 2024-11-18 | End: 2024-11-18 | Stop reason: HOSPADM

## 2024-11-18 RX ORDER — CEPHALEXIN 500 MG/1
1000 CAPSULE ORAL EVERY 6 HOURS SCHEDULED
Qty: 40 CAPSULE | Refills: 0 | Status: SHIPPED | OUTPATIENT
Start: 2024-11-18 | End: 2024-11-23

## 2024-11-18 RX ORDER — MAGNESIUM SULFATE HEPTAHYDRATE 40 MG/ML
2 INJECTION, SOLUTION INTRAVENOUS ONCE
Status: COMPLETED | OUTPATIENT
Start: 2024-11-18 | End: 2024-11-18

## 2024-11-18 RX ADMIN — PANTOPRAZOLE SODIUM 40 MG: 40 TABLET, DELAYED RELEASE ORAL at 06:40

## 2024-11-18 RX ADMIN — DILTIAZEM HYDROCHLORIDE 30 MG: 30 TABLET ORAL at 06:40

## 2024-11-18 RX ADMIN — MAGNESIUM SULFATE HEPTAHYDRATE 2 G: 40 INJECTION, SOLUTION INTRAVENOUS at 09:27

## 2024-11-18 RX ADMIN — CEFAZOLIN SODIUM 2000 MG: 2 SOLUTION INTRAVENOUS at 02:29

## 2024-11-18 RX ADMIN — FUROSEMIDE 20 MG: 10 INJECTION, SOLUTION INTRAMUSCULAR; INTRAVENOUS at 09:58

## 2024-11-18 RX ADMIN — LEVOTHYROXINE SODIUM 125 MCG: 125 TABLET ORAL at 06:40

## 2024-11-18 RX ADMIN — CEFAZOLIN SODIUM 2000 MG: 2 SOLUTION INTRAVENOUS at 11:27

## 2024-11-18 RX ADMIN — METOPROLOL TARTRATE 25 MG: 25 TABLET, FILM COATED ORAL at 02:29

## 2024-11-18 RX ADMIN — APIXABAN 5 MG: 5 TABLET, FILM COATED ORAL at 09:23

## 2024-11-18 RX ADMIN — INSULIN LISPRO 1 UNITS: 100 INJECTION, SOLUTION INTRAVENOUS; SUBCUTANEOUS at 11:58

## 2024-11-18 RX ADMIN — METOPROLOL TARTRATE 25 MG: 25 TABLET, FILM COATED ORAL at 09:23

## 2024-11-18 RX ADMIN — INSULIN LISPRO 1 UNITS: 100 INJECTION, SOLUTION INTRAVENOUS; SUBCUTANEOUS at 07:58

## 2024-11-18 RX ADMIN — DILTIAZEM HYDROCHLORIDE 180 MG: 180 CAPSULE, COATED, EXTENDED RELEASE ORAL at 11:53

## 2024-11-18 RX ADMIN — POTASSIUM CHLORIDE 40 MEQ: 1500 TABLET, EXTENDED RELEASE ORAL at 09:27

## 2024-11-18 NOTE — CASE MANAGEMENT
Case Management Discharge Planning Note    Patient name Jenny Galarza  Location /-01 MRN [id number]  : 1945 Date 2024       Current Admission Date: 11/10/2024  Current Admission Diagnosis:Sepsis (HCC)   Patient Active Problem List    Diagnosis Date Noted Date Diagnosed    Persistent atrial fibrillation with RVR (HCC) 2024     Hypo-osmolality and hyponatremia 2024     Rash 2024     Streptococcal bacteremia 2024     Venous stasis dermatitis of both lower extremities 2024     Sepsis (HCC) 11/10/2024     Muscle weakness (generalized) 11/10/2024     Intractable nausea and vomiting 2019     Chronic kidney disease-mineral and bone disorder 10/07/2019     Benign essential hypertension 2019     Hyperkalemia 2017     Gout 2016     Stage III chronic kidney disease (HCC) 2016     Paroxysmal atrial fibrillation (HCC) in RVR 2016     Hypothyroidism 2014     Primary hypertension 2014     Hyperlipidemia, mixed 10/13/2014     Diabetes mellitus, type 2 (HCC) 10/13/2014     Anemia 10/13/2014       LOS (days): 8  Geometric Mean LOS (GMLOS) (days): 3.5  Days to GMLOS:-4.2     OBJECTIVE:  Risk of Unplanned Readmission Score: 16.56         Current admission status: Inpatient   Preferred Pharmacy:   [id number] - [place], [place] - [address]  [address]  [facility] [address]  Phone: [phone] Fax: [phone]    Primary Care Provider: Kathya Irene DO    Primary Insurance: [employer] REP  Secondary Insurance:     DISCHARGE DETAILS:    Discharge planning discussed with:: Patient at the bedside  Freedom of Choice: Yes  Comments - Freedom of Choice: Patient declined HHC. CM informed her if she changes her mind to please contact her PCP to set up services.  CM contacted family/caregiver?: No- see comments  Were Treatment Team discharge recommendations reviewed with patient/caregiver?: Yes  Did  patient/caregiver verbalize understanding of patient care needs?: Yes  Were patient/caregiver advised of the risks associated with not following Treatment Team discharge recommendations?: Yes    Contacts  Patient Contacts: Shawn, laura  Relationship to Patient:: Family  Reason/Outcome: Emergency Contact    Requested Home Health Care         Is the patient interested in HHC at discharge?: No         Other Referral/Resources/Interventions Provided:  Interventions: DME  Referral Comments: Rolling walker delivered to patient and set up. Patient is aware and agreeable to the $14.02 copay. She signed the delivery ticket and copy. Copy faxed to [place]    Would you like to participate in our [facility] service program?  : No - Declined    Treatment Team Recommendation: Outpatient Rehab, Home with Home Health Care  Discharge Destination Plan:: Outpatient Rehab  Transport at Discharge : Family                                IMM Given to:: Patient     Additional Comments: IMM and Medicare rights reviewed with patient at the bedside. Patient verbalized understanding and signed original. Copy given to patient, signed original filed to medical records.

## 2024-11-18 NOTE — ASSESSMENT & PLAN NOTE
"/61   Pulse 83   Temp 97.7 °F (36.5 °C)   Resp 16   Ht 5' 4\" (1.626 m)   Wt 82.2 kg (181 lb 3.5 oz)   SpO2 100%   BMI 31.11 kg/m²   RVR noted on tele this am, on digoxin now s/p load   Previously holding patient's home Toprol-XL/diltiazem due to hypotension- likely reason for rapid afib  Continued to be in rapid A-fib even after starting her on p.o. Cardizem 60 mg every 6 hours  Patient now off of Cardizem drip and started on 30 mg every 6 hours of p.o. Cardizem . S/p dig load  Monitor blood pressures closely  continue Lopressor 25 mg every 8 hours and follow cardiology recommendations  Monitor on tele  Keep potassium close to 4 and mag 2  Continue Eliquis.  "

## 2024-11-18 NOTE — NURSING NOTE
Reviewed discharge instructions with pt, removed IV and Masimo. All belongings taken with patient.

## 2024-11-18 NOTE — ASSESSMENT & PLAN NOTE
Sepsis/bacteremia  Management per ID/primary team.    Suspected secondary to venous stasis dermatitis/lower extremity skin breaks.    On antibiotics.

## 2024-11-18 NOTE — ASSESSMENT & PLAN NOTE
Lab Results   Component Value Date    EGFR 64 11/18/2024    EGFR 58 11/17/2024    EGFR 53 11/16/2024    CREATININE 0.87 11/18/2024    CREATININE 0.94 11/17/2024    CREATININE 1.02 11/16/2024   Management per primary team     SUBJECTIVE:  Joyce is a 39 year old female here for concerns about allergies.  Longstanding history of allergies, recently has had increased trouble with wheezing chest tightness and chest congestion.  Previously had only occasional congestion but now symptoms have become more bothersome.  Notes stuffy nose, postnasal drip, sneezing, sinus headache, itchy watery eyes.  Also feels shortness of breath wheezing and chest tightness which is relatively symptoms are year-round.  Has never been diagnosed with asthma before.  I do note that 2 years ago patient was placed on a beta-blocker for hypertension control but this does not necessarily directly correlate to the onset of symptoms.    Nasal symptoms aggravated by dust exposure, strong odors, possibly cats, temperature change and cigarette smoke.  Chest symptoms are aggravated by cut grass, exertion, dust exposure, smoke, cold air and viral infections.  Sometimes feels chest tightness and feels confused because feeling so short of breath.  Has never been to the hospital for an asthma attack or exacerbation.  Has never been formally diagnosed with bronchitis or pneumonia does see some mold growth in the home and wonders if this play some role.  Using albuterol multiple times a day right now, up to 3 times a day for symptom relief.  Is on montelukast, no improvement with this.  Also takes allergy medication as detailed below.  This includes fluticasone, olopatadine eyedrops, the montelukast and oral antihistamines.      Current Outpatient Medications   Medication   • galcanezumab-gnlm (Emgality) 120 MG/ML injection   • sumatriptan (IMITREX) 100 MG tablet   • fluticasone (FLONASE) 50 MCG/ACT nasal spray   • lisinopril (ZESTRIL) 10 MG tablet   • olopatadine (PATANOL) 0.1 % ophthalmic solution   • Retin-A 0.05 % cream   • clobetasol (TEMOVATE) 0.05 % topical solution   • montelukast (SINGULAIR) 10 MG tablet   • triamcinolone (ARISTOCORT) 0.1 % cream   • topiramate (TOPAMAX)  50 MG tablet   • pantoprazole (PROTONIX) 20 MG tablet   • labetalol (NORMODYNE) 200 MG tablet  Started 2 years.     • azelaic acid (FINACEA) 15 % gel   • Multiple Vitamins-Minerals (WOMENS MULTI PO)   • clindamycin (CLEOCIN T) 1 % topical solution   • Lactobacillus-Inulin ([facility] DIGESTIVE [facility]) capsule   • albuterol 108 (90 Base) MCG/ACT inhaler   • ondansetron (ZOFRAN ODT) 4 MG disintegrating tablet   • acetaminophen (TYLENOL) 325 MG tablet     No current facility-administered medications for this visit.       ALLERGIES:   is allergic to neosporin pain relieving.    No history of Bee Sting/Venom Allergy   No history of Food Allergy      PAST SURGICAL HISTORY  Past Surgical History:   Procedure Laterality Date   • Cervical biopsy      negative per patient   •  section, low transverse  , 2019   • D&c after delivery  2019    after delayed PPH   • Removal of tonsils,<11 y/o     • Salpingectomy  2019   • Tubal ligation         PAST MEDICAL HISTORY  Past Medical History:   Diagnosis Date   • Acne    • ADD (attention deficit disorder)    • Bipolar affective disorder (CMS/HCC)    • Essential (primary) hypertension    • GERD (gastroesophageal reflux disease)    • History of drug use 5/10/2017   • Tobacco use 5/10/2017       FAMILY HISTORY  Family History   Problem Relation Age of Onset   • Heart Mother         MI at 48 yo   • Hypertension Mother    • Hyperlipidemia Mother    • Alcohol Abuse Mother    • Cirrhosis Father    • Hypertension Father    • Alcohol Abuse Father    • Stroke Paternal Grandmother        SOCIAL HISTORY:  Second hand smoke is not present.  Joyce is at home.   Joyce does not smoke.  Joyce does not drink alcohol.      ENVIRONMENTAL HISTORY:  Joyce lives in an apartment.  Pets:  Joyce has a dog.  The home has central HVAC.  The bedroom has wall to wall carpeting.  The basement is wet.  There is mold apparent in the home.  Cockroaches are not present in the home.      REVIEW  OF SYSTEMS:  Notes fatigue.  No weight loss or vertigo.  No headache, blurry or double vision.  No problems with taste or smell.  No bleeding of mouth or gums. no earache.  No pharyngitis.  No chest pain or palpitations.  Notes wheezing, coughing or shortness of breath. No abdominal pain, cramping or diarrhea. No reflux. No hematuria or dysuria. No arthritis in the legs. No swelling in the legs. No skin lesions or rashes. No hives or urticaria. Notes complaints of anxiety or nervousness.     PHYSICAL EXAMINATION:   GENERAL: Joyce is in no acute distress.   VITAL SIGNS:   Visit Vitals  /72   Pulse 86   Temp 99.2 °F (37.3 °C) (Oral)   LMP 05/04/2021 (Exact Date)   SpO2 98%      HEENT exam is clear. There are no allergic shiners or sinus tenderness. Conjunctivae, pupils, and lids are normal. External and internal ear exam is normal. Nasal mucosa is pale. There is no turbinate hypertrophy, no polyps, no bleeding, no ulcer, no swelling, no discharge.  Oropharynx is clear. There is no thrush. Lips, gums and teeth are normal.   NECK: Supple and flexible with no cervical lymphadenopathy. Trachea is midline. There is no stridor.  Thyroid exam is normal.  CHEST: There are no supra or sub-clavicular lymph nodes palpable.  There are no retractions or visible respiratory distress.  No wheezes, rales or rhonchi. There is good air movement throughout.    CARDIAC: Regular rate and rhythm. Normal S1, S2.  No murmur, gallop or rub. There is  no peripheral edema.   ABDOMEN: Normal bowel sounds in all four quadrants.  Soft, with no epigastric tenderness or rebound. No hepatomegaly or splenomegaly.     SKIN: Warm and dry.  There is no eczema, urticaria or dermatographism.   NEURO: The patient is alert and oriented x 3 with a normal affect.  The patient gives a clear medical history    DATA:    Spirometry today is normal.  FEV1 102% predicted, % predicted.  Flow volume loop is near normal although effort is somewhat  erratic.    ASSESSMENT AND PLAN:  Based on today's evaluation, I made the following assessment and recommendations:    1. Mild persistent asthma with frequent albuterol use.  Normal lung function today but albuterol use 3 times a day.  Cut this down to p.r.n..  Start Flovent 110, take 2 puffs b.i.d. with this goal of reducing albuterol use.  Continue montelukast for now.  Check a chest x-ray which shows some hyperinflation.  Check CBC and IgE.  Follow-up in 1 month to confirm improvement.  Consider whether beta-blocker could be playing some role in symptoms although there is no obstruction right now.  Do not stop the medication without checking with primary physician 1st.    Encouraged patient to get COVID vaccine declined this today    2. Allergic rhinitis by history, cannot skin test on beta-blocker.  Check immuno cap for inhalant allergens make recommendations based on results.  Continue fluticasone and antihistamine and montelukast for now.      Thank you for allowing me to share in Joyce's  care.     Jun Stanton MD

## 2024-11-18 NOTE — ASSESSMENT & PLAN NOTE
Lab Results   Component Value Date    HGBA1C 7.8 (H) 11/11/2024       Recent Labs     11/17/24  1144 11/17/24  1636 11/17/24  2101 11/18/24  0730   POCGLU 164* 161* 234* 181*       Blood Sugar Average: Last 72 hrs:  (P) 174.25  Holding home Jardiance/Glyxambi  Patient placed on sliding scale coverage with ACHS checks  Consistent carb diet  Hypoglycemia protocol.

## 2024-11-18 NOTE — PROGRESS NOTES
Progress Note - Cardiology   Name: Jenny Galarza 79 y.o. female I MRN: [id number]  Unit/Bed#: -01 I Date of Admission: 11/10/2024   Date of Service: 11/18/2024 I Hospital Day: 8    Assessment & Plan  Persistent atrial fibrillation with RVR (HCC)  Longstanding persistent atrial fibrillation with RVR  Heart rates better controlled in the 80s  S/p IV digoxin, dig level 1.2 today   DC with Cardizem  and Toprol 100mg QD  Continue anticoagulation with Eliquis.  [id number] 5.   Continue telemetry    Streptococcal bacteremia  Sepsis/bacteremia  Management per ID/primary team.    Suspected secondary to venous stasis dermatitis/lower extremity skin breaks.    On antibiotics.    Anemia  Management per primary team    Primary hypertension  Continue Lasix, Toprol, Cardizem   Continue to monitor BPs closely.    Hyperlipidemia, mixed  Continue statin    Stage III chronic kidney disease ([facility])  Lab Results   Component Value Date    EGFR 64 11/18/2024    EGFR 58 11/17/2024    EGFR 53 11/16/2024    CREATININE 0.87 11/18/2024    CREATININE 0.94 11/17/2024    CREATININE 1.02 11/16/2024   Management per primary team    Diabetes mellitus, type 2 ([facility])  Lab Results   Component Value Date    HGBA1C 7.8 (H) 11/11/2024   Mgmt per SLIM    Pt stable from cardiac standpoint for DC; Pt to follow with primary cardiologist Dr Chavarria    Subjective   Chief Complaint: Im feeling much better. Denies cp, palpitations    Objective :  Temp:  [97.7 °F (36.5 °C)-98.9 °F (37.2 °C)] 97.7 °F (36.5 °C)  HR:  [74-98] 74  BP: (115-143)/(60-80) 120/65  Resp:  [15-16] 16  SpO2:  [95 %-100 %] 100 %  O2 Device: None (Room air)  Orthostatic Blood Pressures      Flowsheet Row Most Recent Value   Blood Pressure 120/65 filed at 11/18/2024 1153   Patient Position - Orthostatic VS Lying filed at 11/17/2024 2118          First Weight: Weight - Scale: 76 kg (167 lb 8.8 oz) (11/10/24 1757)  Vitals:    11/18/24 0713 11/18/24 0736   Weight: 82.2 kg (181 lb 3.5  oz) 82.2 kg (181 lb 3.5 oz)     Physical Exam  Vitals and nursing note reviewed. Exam conducted with a chaperone present.   Constitutional:       Appearance: Normal appearance.   HENT:      Head: Normocephalic and atraumatic.   Cardiovascular:      Rate and Rhythm: Normal rate. Rhythm irregular.      Pulses: Normal pulses.      Heart sounds: Normal heart sounds.   Pulmonary:      Effort: Pulmonary effort is normal.      Breath sounds: Normal breath sounds.   Musculoskeletal:         General: Normal range of motion.      Cervical back: Normal range of motion and neck supple.   Skin:     General: Skin is warm and dry.   Neurological:      General: No focal deficit present.      Mental Status: She is alert and oriented to person, place, and time.   Psychiatric:         Mood and Affect: Mood normal.         Behavior: Behavior normal.         Thought Content: Thought content normal.         Judgment: Judgment normal.           Lab Results: I have reviewed the following results:CBC/BMP:   .     11/18/24  0651   WBC 13.50*   HGB 10.8*   HCT 34.7*      SODIUM 137   K 3.4*      CO2 25   BUN 13   CREATININE 0.87   GLUC 156*   MG 1.7*    , Creatinine Clearance: Estimated Creatinine Clearance: 56.2 mL/min (by C-G formula based on SCr of 0.87 mg/dL)., LFTs: No new results in last 24 hours. , PTT/INR:No new results in last 24 hours. , Troponin,BNP:No new results in last 24 hours.   Results from last 7 days   Lab Units 11/18/24  0651 11/17/24  0514 11/16/24  0553   WBC Thousand/uL 13.50* 13.86* 12.24*   HEMOGLOBIN g/dL 10.8* 11.1* 11.0*   HEMATOCRIT % 34.7* 35.0 34.5*   PLATELETS Thousands/uL 348 355 313     Results from last 7 days   Lab Units 11/18/24  0651 11/17/24  0514 11/16/24  0553   POTASSIUM mmol/L 3.4* 3.4* 3.6   CHLORIDE mmol/L 104 103 103   CO2 mmol/L 25 24 25   BUN mg/dL 13 13 13   CREATININE mg/dL 0.87 0.94 1.02   CALCIUM mg/dL 7.8* 7.7* 7.7*         Lab Results   Component Value Date    HGBA1C 7.8 (H)  11/11/2024     Lab Results   Component Value Date    TROPONINI <0.02 11/13/2019

## 2024-11-18 NOTE — ASSESSMENT & PLAN NOTE
Longstanding persistent atrial fibrillation with RVR  Heart rates better controlled in the 80s  S/p IV digoxin, dig level 1.2 today   DC with Cardizem  and Toprol 100mg QD  Continue anticoagulation with Eliquis.  [id number] 5.   Continue telemetry

## 2024-11-18 NOTE — ASSESSMENT & PLAN NOTE
"/61   Pulse 83   Temp 97.7 °F (36.5 °C)   Resp 16   Ht 5' 4\" (1.626 m)   Wt 82.2 kg (181 lb 3.5 oz)   SpO2 100%   BMI 31.11 kg/m²   Stable pressures  "

## 2024-11-18 NOTE — PLAN OF CARE
Problem: SAFETY ADULT  Goal: Patient will remain free of falls  Description: INTERVENTIONS:  - Educate patient/family on patient safety including physical limitations  - Instruct patient to call for assistance with activity   - Consult OT/PT to assist with strengthening/mobility   - Keep Call bell within reach  - Keep bed low and locked with side rails adjusted as appropriate  - Keep care items and personal belongings within reach  - Initiate and maintain comfort rounds  - Make Fall Risk Sign visible to staff  - Offer Toileting every 2 Hours, in advance of need  - Initiate/Maintain bed/chair alarm  - Apply yellow socks and bracelet for high fall risk patients  - Consider moving patient to room near nurses station  Outcome: Progressing  Goal: Maintain or return to baseline ADL function  Description: INTERVENTIONS:  -  Assess patient's ability to carry out ADLs; assess patient's baseline for ADL function and identify physical deficits which impact ability to perform ADLs (bathing, care of mouth/teeth, toileting, grooming, dressing, etc.)  - Assess/evaluate cause of self-care deficits   - Assess range of motion  - Assess patient's mobility; develop plan if impaired  - Assess patient's need for assistive devices and provide as appropriate  - Encourage maximum independence but intervene and supervise when necessary  - Involve family in performance of ADLs  - Assess for home care needs following discharge   - Consider OT consult to assist with ADL evaluation and planning for discharge  - Provide patient education as appropriate  Outcome: Progressing  Goal: Maintains/Returns to pre admission functional level  Description: INTERVENTIONS:  - Perform AM-PAC 6 Click Basic Mobility/ Daily Activity assessment daily.  - Set and communicate daily mobility goal to care team and patient/family/caregiver.   - Collaborate with rehabilitation services on mobility goals if consulted  - Perform Range of Motion 3 times a day.  -  Reposition patient every 2 hours.  - Dangle patient 3 times a day  - Stand patient 3 times a day  - Ambulate patient 3 times a day  - Out of bed to chair 3 times a day   - Out of bed for meals 3 times a day  - Out of bed for toileting  - Record patient progress and toleration of activity level   Outcome: Progressing

## 2024-11-18 NOTE — ASSESSMENT & PLAN NOTE
Lab Results   Component Value Date    EGFR 64 11/18/2024    EGFR 58 11/17/2024    EGFR 53 11/16/2024    CREATININE 0.87 11/18/2024    CREATININE 0.94 11/17/2024    CREATININE 1.02 11/16/2024     Stable renal function  Noted slightly elevation of creatinine from her baseline 1.1-1.2  Suspected in the setting of bacteremia.  Continue with IV fluids and reassess BMP in the morning.  Avoid nephrotoxic agents and hypoperfusion.

## 2024-11-18 NOTE — DISCHARGE SUMMARY
"Discharge Summary - Hospitalist   Name: Jenny Galarza 79 y.o. female I MRN: [id number]  Unit/Bed#: -01 I Date of Admission: 11/10/2024   Date of Service: 11/18/2024 I Hospital Day: 8     Assessment & Plan  Sepsis (HCC)  resolved  Blood culture: GAS  ID following  Currently on cefazolin, was on ceftriaxone but developed rash with it and improved with cefazolin  Procal downtrending  Leukocytosis could be reactive to RVR, will continue to monitor  Streptococcal bacteremia  Plan to treat for total of 14 days until 11/23/2024, transition to p.o. at discharge.  ID recommends Keflex 1 g every 6 hours.  Until then continue IV antibiotics  Patient had 1/2 blood culture positive for Streptococcus pyogenes, unclear source of infection. Likely Lower extremity wound  Per infectious disease recommending to continue with IV ceftriaxone  Recommended Doppler ultrasound to rule out septic thrombophlebitis    Paroxysmal atrial fibrillation (HCC) in RVR  /61   Pulse 83   Temp 97.7 °F (36.5 °C)   Resp 16   Ht 5' 4\" (1.626 m)   Wt 82.2 kg (181 lb 3.5 oz)   SpO2 100%   BMI 31.11 kg/m²   RVR noted on tele this am, on digoxin now s/p load   Previously holding patient's home Toprol-XL/diltiazem due to hypotension- likely reason for rapid afib  Continued to be in rapid A-fib even after starting her on p.o. Cardizem 60 mg every 6 hours  Patient now off of Cardizem drip and started on 30 mg every 6 hours of p.o. Cardizem . S/p dig load  Monitor blood pressures closely  continue Lopressor 25 mg every 8 hours and follow cardiology recommendations  Monitor on tele  Keep potassium close to 4 and mag 2  Continue Eliquis.  Anemia  iron panel shows iron level of <10, profound BRITTON- start IV venofer- cleared by ID   FOBT is positive  GI recommends outpatient endoscopy and has signed off  Has hemorrhoids, and does bleed off and on which can lead to chronic iron deficiency    Muscle weakness (generalized)  Coming in with generalized " "weakness/poor oral intake/lightheadedness/chills  Patient also noted to be hypotensive in the ER with systolic blood pressure readings in the 90s which is now improved  Patient receiving empiric IV fluids/IV antibiotics  Hold home antihypertensive agents.  PT OT shalondaal requested  Hypothyroidism  Continue with Synthroid  Hyperlipidemia, mixed  Continue statin.  Diabetes mellitus, type 2 ([facility])  Lab Results   Component Value Date    HGBA1C 7.8 (H) 11/11/2024       Recent Labs     11/17/24  1144 11/17/24  1636 11/17/24  2101 11/18/24  0730   POCGLU 164* 161* 234* 181*       Blood Sugar Average: Last 72 hrs:  (P) 174.25  Holding home Jardiance/Glyxambi  Patient placed on sliding scale coverage with ACHS checks  Consistent carb diet  Hypoglycemia protocol.  Stage III chronic kidney disease ([facility])  Lab Results   Component Value Date    EGFR 64 11/18/2024    EGFR 58 11/17/2024    EGFR 53 11/16/2024    CREATININE 0.87 11/18/2024    CREATININE 0.94 11/17/2024    CREATININE 1.02 11/16/2024     Stable renal function  Noted slightly elevation of creatinine from her baseline 1.1-1.2  Suspected in the setting of bacteremia.  Continue with IV fluids and reassess BMP in the morning.  Avoid nephrotoxic agents and hypoperfusion.  Venous stasis dermatitis of both lower extremities  Venous duplex does not show any DVT  Hypo-osmolality and hyponatremia  Pseudohyponatremia, due to elevated blood glucose   Rash  Start benadyl cream  Avoid systemic benadryl for now unless symptoms not controlled to topical  Likely allergy based   Antibiotic switched to IV cefazolin from ceftriaxone  Primary hypertension  /61   Pulse 83   Temp 97.7 °F (36.5 °C)   Resp 16   Ht 5' 4\" (1.626 m)   Wt 82.2 kg (181 lb 3.5 oz)   SpO2 100%   BMI 31.11 kg/m²   Stable pressures  Persistent atrial fibrillation with RVR ([facility])  As above     Medical Problems       Resolved Problems  Date Reviewed: 11/17/2024   None       Discharging Physician / Practitioner: " Lex Dick MD  PCP: Kathya Irene DO  Admission Date:   Admission Orders (From admission, onward)       Ordered        11/10/24 1730  Inpatient Admission  Once                          Discharge Date: 11/18/24    Consultations During Hospital Stay:  IP CONSULT TO INFECTIOUS DISEASES  IP CONSULT TO CARDIOLOGY  IP CONSULT TO GASTROENTEROLOGY      Procedures Performed:   Echo:   Overall preserved left ventricular systolic function of 60 to 65%.  The regional wall motion abnormalities cannot be assessed because of poor endocardial definition.n.    Biatrial enlargement of mild to moderate degree.    Mild mitral calcification with mild mitral regurgitation.  Mild thickening of the aortic valve leaflets.  No obvious vegetation noted on the heart valves.    Diastolic function cannot be assessed because of atrial fibrillation.      Significant Findings / Test Results:   As above    Incidental Findings:   Irregular opacity in the right upper lobe and a few additional pulmonary nodules with the largest being 7 mm in the left lower lobe. Recommend 3-month follow-up chest CT.    Multiple pancreatic cysts have mildly enlarged from 2019, largest measuring 2.6 cm in the pancreatic head.  Recommend outpatient contrast-enhanced abdominal MRI and MRCP for further evaluation of these solid organ findings.     All these findings have been discussed with the patient and patient's son in detail.  They understand and they will follow-up outpatient with her PCP, pulmonary and GI    Test Results Pending at Discharge (will require follow up):   none     Outpatient Tests Requested:  BMP and mag with PCP in one week    Complications:  afib RVR    Reason for Admission: generalized weakness    Hospital Course:   Jenny Galarza is a 79 y.o. female patient who originally presented to the hospital on 11/10/2024 due to generalized weakness, diagnosed to be having sepsis likely source lower extremity wound.  Initial thought process was UTI as  "well but that was ruled out.  Patient was found to be bacteremic with group A pyogenes streptococcus.  Patient has been treated with IV antibiotics: Initially received 2 days of ceftriaxone and then was transitioned to IV cefazolin.  Has been thoroughly treated with IV antibiotics for 9 days in the hospital and has been transitioned to p.o. Keflex 1 g every 6 hours as recommended by infectious disease until 11/23/2024 to finish 2 weeks of antibiotic treatment.  Imaging of CT abdomen pelvis with contrast did not show any significant findings that would explain patient's bacteremia.  Incidental findings as mentioned above.  Patient was then found to go into rapid A-fib as her home medications were held secondary to low blood pressures initially from sepsis and bacteremia.  Patient was upgraded to stepdown to and was started on Cardizem drip.  Anticoagulation was continued.  Cardiology was consulted and echocardiogram was done as mentioned above.  Digoxin load was given as well.  A-fib RVR is now resolved but patient continues to be in rate controlled A-fib and follows with Dr. Wylie as outpatient.  Cardiology has cleared the patient for discharge today on home dose of Cardizem and Toprol-XL along with 20 mg of p.o. Lasix.  Patient was also found to have iron deficiency anemia and was given Venofer.  Patient also received IV Lasix for lower extremity swelling which is now better and will need compression stockings at discharge  Patient has been cleared for discharge by infectious disease and cardiology.    Please see above list of diagnoses and related plan for additional information.     Condition at Discharge: stable    Discharge Day Visit / Exam:   Subjective: I am doing much better, would like to go home today  Vitals: Blood Pressure: 126/61 (11/18/24 0959)  Pulse: 83 (11/18/24 0959)  Temperature: 97.7 °F (36.5 °C) (11/18/24 0713)  Temp Source: Oral (11/17/24 2118)  Respirations: 16 (11/18/24 0640)  Height: 5' 4\" " (162.6 cm) (11/13/24 1041)  Weight - Scale: 82.2 kg (181 lb 3.5 oz) (11/18/24 0736)  SpO2: 100 % (11/18/24 0959)  Physical Exam General- Awake, alert and oriented x 3, looks comfortable  HEENT- Normocephalic, atraumatic, oral mucosa- moist  Neck- Supple, No carotid bruit, no JVD  CVS- Normal S1/ S2, irregular, No murmur, pedal edema, +1 pitting   respiratory system- B/L clear breath sounds, no wheezing  Abdomen- Soft, Non distended, no tenderness, Bowel sound- present 4 quads  Genitourinary- No suprapubic tenderness, No CVA tenderness  Skin-no cellulitis noted, wound appears to be healing a right lower extremity  Musculoskeletal- No gross deformity  Psych- No acute psychosis  CNS- CN II- XII grossly intact, No acute focal neurologic deficit noted      Discussion with Family: Updated  (son) via phone.    Discharge instructions/Information to patient and family:   See after visit summary for information provided to patient and family.      Provisions for Follow-Up Care:  See after visit summary for information related to follow-up care and any pertinent home health orders.      Mobility at time of Discharge:   Basic Mobility Inpatient Raw Score: 23  JH-HLM Goal: 7: Walk 25 feet or more  JH-HLM Achieved: 7: Walk 25 feet or more  HLM Goal achieved. Continue to encourage appropriate mobility.     Disposition:   Home with [facility] Services (Reminder: Complete face to face encounter)    Planned Readmission: no    Discharge Medications:  See after visit summary for reconciled discharge medications provided to patient and/or family.      Administrative Statements   Discharge Statement:  I have spent a total time of 76 minutes in caring for this patient on the day of the visit/encounter. >30 minutes of time was spent on: Diagnostic results, Prognosis, Risks and benefits of tx options, Instructions for management, Patient and family education, Importance of tx compliance, Risk factor reductions, Impressions,  Counseling / Coordination of care, Documenting in the medical record, Reviewing / ordering tests, medicine, procedures  , and Communicating with other healthcare professionals .    **Please Note: This note may have been constructed using a voice recognition system**

## 2024-12-11 PROBLEM — A41.9 SEPSIS (HCC): Status: RESOLVED | Noted: 2024-11-10 | Resolved: 2024-12-11

## 2025-03-31 LAB
25(OH)D3+25(OH)D2 SERPL-MCNC: 42 NG/ML (ref 30–100)
BASOPHILS # BLD AUTO: 0.1 THOU/CMM (ref 0–0.1)
BASOPHILS NFR BLD AUTO: 1 %
CREAT UR-MCNC: 14.1 MG/DL (ref 50–200)
DIFFERENTIAL METHOD BLD: ABNORMAL
EOSINOPHIL # BLD AUTO: 0.3 THOU/CMM (ref 0–0.5)
EOSINOPHIL NFR BLD AUTO: 4 %
ERYTHROCYTE [DISTWIDTH] IN BLOOD BY AUTOMATED COUNT: 15.7 % (ref 12–16)
GLUCOSE UR QL STRIP: ABNORMAL MG/DL
HCT VFR BLD AUTO: 42 % (ref 35–43)
HGB BLD-MCNC: 13.7 G/DL (ref 11.5–14.5)
HGB UR QL STRIP: NEGATIVE MG/DL
KETONES UR QL STRIP: NEGATIVE MG/DL
LEUKOCYTE ESTERASE UR QL STRIP: NEGATIVE /UL
LYMPHOCYTES # BLD AUTO: 2.1 THOU/CMM (ref 1–3)
LYMPHOCYTES NFR BLD AUTO: 27 %
MCH RBC QN AUTO: 28 PG (ref 26–34)
MCHC RBC AUTO-ENTMCNC: 32.7 G/DL (ref 32–37)
MCV RBC AUTO: 86 FL (ref 80–100)
MONOCYTES # BLD AUTO: 0.6 THOU/CMM (ref 0.3–1)
MONOCYTES NFR BLD AUTO: 8 %
NEUTROPHILS # BLD AUTO: 4.7 THOU/CMM (ref 1.8–7.8)
NEUTROPHILS NFR BLD AUTO: 60 %
NITRITE UR QL STRIP: NEGATIVE
PH UR: 5 [PH] (ref 4.5–8)
PHOSPHATE SERPL-MCNC: 4.4 MG/DL (ref 2.3–4.6)
PLATELET # BLD AUTO: 264 THOU/CMM (ref 140–350)
PMV BLD REES-ECKER: 8.3 FL (ref 7.5–11.3)
PROT 24H UR-MRATE: NEGATIVE MG/DL
PROT UR-MCNC: 4.6 MG/DL
PROT/CREAT UR: 0.33
RBC # BLD AUTO: 4.91 MILL/CMM (ref 3.7–4.7)
RBC #/AREA URNS HPF: ABNORMAL /HPF (ref 0–2)
SL AMB POCT URINE COMMENT: ABNORMAL
SP GR UR: 1.01 (ref 1–1.03)
SPECIMEN SOURCE: ABNORMAL
SQUAMOUS #/AREA URNS HPF: ABNORMAL /LPF (ref 0–5)
WBC # BLD AUTO: 7.8 THOU/CMM (ref 4–10)
WBC #/AREA URNS HPF: ABNORMAL /HPF (ref 0–5)

## 2025-04-01 LAB — PTH-INTACT SERPL-MCNC: 47.2 PG/ML (ref 12–88)

## 2025-04-04 ENCOUNTER — TELEPHONE (OUTPATIENT)
Dept: NEPHROLOGY | Facility: CLINIC | Age: 80
End: 2025-04-04

## 2025-04-04 NOTE — TELEPHONE ENCOUNTER
I called [employer] @ [phone] and spoke to Trupti () to check eligible for the patient and as of 4/4/2025 the patient has current active coverage as of 1/1/2025.  Jennifer Foster, .

## 2025-04-15 ENCOUNTER — OFFICE VISIT (OUTPATIENT)
Dept: NEPHROLOGY | Facility: CLINIC | Age: 80
End: 2025-04-15
Payer: COMMERCIAL

## 2025-04-15 VITALS
OXYGEN SATURATION: 99 % | SYSTOLIC BLOOD PRESSURE: 110 MMHG | TEMPERATURE: 96.9 F | HEIGHT: 64 IN | HEART RATE: 61 BPM | BODY MASS INDEX: 29.02 KG/M2 | RESPIRATION RATE: 16 BRPM | DIASTOLIC BLOOD PRESSURE: 70 MMHG | WEIGHT: 170 LBS

## 2025-04-15 DIAGNOSIS — E83.9 CHRONIC KIDNEY DISEASE-MINERAL AND BONE DISORDER: ICD-10-CM

## 2025-04-15 DIAGNOSIS — E11.22 TYPE 2 DIABETES MELLITUS WITH STAGE 3B CHRONIC KIDNEY DISEASE, WITHOUT LONG-TERM CURRENT USE OF INSULIN (HCC): ICD-10-CM

## 2025-04-15 DIAGNOSIS — I10 BENIGN ESSENTIAL HYPERTENSION: ICD-10-CM

## 2025-04-15 DIAGNOSIS — I48.0 PAROXYSMAL ATRIAL FIBRILLATION (HCC): ICD-10-CM

## 2025-04-15 DIAGNOSIS — N18.9 CHRONIC KIDNEY DISEASE-MINERAL AND BONE DISORDER: ICD-10-CM

## 2025-04-15 DIAGNOSIS — M89.9 CHRONIC KIDNEY DISEASE-MINERAL AND BONE DISORDER: ICD-10-CM

## 2025-04-15 DIAGNOSIS — N18.32 TYPE 2 DIABETES MELLITUS WITH STAGE 3B CHRONIC KIDNEY DISEASE, WITHOUT LONG-TERM CURRENT USE OF INSULIN (HCC): ICD-10-CM

## 2025-04-15 DIAGNOSIS — N18.32 STAGE 3B CHRONIC KIDNEY DISEASE (HCC): Primary | ICD-10-CM

## 2025-04-15 PROCEDURE — 99214 OFFICE O/P EST MOD 30 MIN: CPT | Performed by: INTERNAL MEDICINE

## 2025-04-15 RX ORDER — INSULIN GLARGINE 100 [IU]/ML
20 INJECTION, SOLUTION SUBCUTANEOUS DAILY
COMMUNITY
Start: 2025-02-13 | End: 2025-05-14

## 2025-04-15 NOTE — PROGRESS NOTES
NEPHROLOGY OFFICE FOLLOW UP  Jenny Galarza 80 y.o.female YOB: 1945 MRN: [id number]    Encounter: [id number] DATE: 4/15/2025    REASON FOR VISIT: Jenny Galarza is a 80 y.o. female who is here on 4/15/2025 for Chronic Kidney Disease and Follow-up      ASSESSMENT and PLAN:  Jenny was seen today for chronic kidney disease and follow-up.    Diagnoses and all orders for this visit:    Stage 3b chronic kidney disease (HCC)  -     Basic metabolic panel; Future  -     CBC and differential; Future  -     Phosphorus; Future  -     Protein / creatinine ratio, urine; Future  -     PTH, intact; Future  -     UA (URINE) with reflex to Scope; Future  -     Vitamin D 25 hydroxy; Future    Chronic kidney disease-mineral and bone disorder  -     Basic metabolic panel; Future  -     CBC and differential; Future  -     Phosphorus; Future  -     Protein / creatinine ratio, urine; Future  -     PTH, intact; Future  -     UA (URINE) with reflex to Scope; Future  -     Vitamin D 25 hydroxy; Future    Benign essential hypertension    Paroxysmal atrial fibrillation (HCC) in [facility]    Type 2 diabetes mellitus with stage 3b chronic kidney disease, without long-term current use of insulin (HCC)      Assessment & Plan  Stage 3b chronic kidney disease (HCC)  Lab Results   Component Value Date    EGFR 42 (L) 03/31/2025    EGFR 41 (L) 01/29/2025    EGFR 64 11/18/2024    CREATININE 1.3 (H) 03/31/2025    CREATININE 1.32 (H) 01/29/2025    CREATININE 0.87 11/18/2024   Renal function is quite stable.  Patient was hospitalized with sepsis but overall seems to be stable for now    Advise hydration  Chronic kidney disease-mineral and bone disorder  Lab Results   Component Value Date    EGFR 42 (L) 03/31/2025    EGFR 41 (L) 01/29/2025    EGFR 64 11/18/2024    CREATININE 1.3 (H) 03/31/2025    CREATININE 1.32 (H) 01/29/2025    CREATININE 0.87 11/18/2024   PTH and phosphorus along with vitamin D are within acceptable range and will continue  to monitor  Benign essential hypertension  Very well-controlled  Paroxysmal atrial fibrillation (HCC) in R[facility]  Rate is stable  Type 2 diabetes mellitus with stage 3b chronic kidney disease, without long-term current use of insulin (HCC)    Lab Results   Component Value Date    HGBA1C 8.6 (H) 01/29/2025     Need to be better controlled per patient is taking insulin Jardiance which we will continue for now    Everything discussed at length with the patient.  I will see her back in 6 months.  Will get blood and urine test before that visit    HPI:    Patient with CKD came for follow-up    Since I saw her last patient was hospitalized with sepsis likely because of cellulitis after fall    She is doing much better    She stayed in the hospital most for 7 days    No acute complaint today    No chest pain no palpitation    Denies any urinary complaint        REVIEW OF SYSTEMS:    Review of Systems   Constitutional:  Negative for fatigue.   HENT:  Negative for congestion.    Eyes:  Negative for photophobia and pain.   Respiratory:  Negative for chest tightness and shortness of breath.    Cardiovascular:  Negative for chest pain and palpitations.   Gastrointestinal:  Negative for abdominal distention, abdominal pain and blood in stool.   Endocrine: Negative for polydipsia.   Genitourinary:  Negative for difficulty urinating, dysuria, flank pain, hematuria and urgency.   Musculoskeletal:  Negative for arthralgias and back pain.   Skin:  Negative for rash.   Neurological:  Negative for dizziness, light-headedness and headaches.   Hematological:  Does not bruise/bleed easily.   Psychiatric/Behavioral:  Negative for behavioral problems. The patient is not nervous/anxious.          PAST MEDICAL HISTORY:  Past Medical History:   Diagnosis Date    A-fib (HCC)     Anemia     Chronic kidney disease     Diabetes mellitus (HCC)     Hypertension        PAST SURGICAL HISTORY:  Past Surgical History:   Procedure Laterality Date    CATARACT  EXTRACTION, BILATERAL      CHOLECYSTECTOMY      GALLBLADDER SURGERY      HERNIA REPAIR      REPLACEMENT TOTAL KNEE      TONSILLECTOMY         SOCIAL HISTORY:  Social History     Substance and Sexual Activity   Alcohol Use Never     Social History     Substance and Sexual Activity   Drug Use No     Social History     Tobacco Use   Smoking Status Former   Smokeless Tobacco Former       FAMILY HISTORY:  Family History   Problem Relation Age of Onset    No Known Problems Mother     Heart disease Father     No Known Problems Sister        ALLERGY:  Allergies   Allergen Reactions    Amoxicillin-Pot Clavulanate Rash and Hives    Bacitracin Itching and Edema     Action Taken: osorio swelling, had to go to er;     Ceftriaxone Rash     Patient received 2 doses of ceftriaxone during 11/2024 admission. Developed pruritic coalescing macular rash over bilateral arms, chest, back. No respiratory symptoms or swelling.  Did not appear to be hives.  Pictures available in Media tab from 11/12/24.    Clindamycin      Other reaction(s): Rash       MEDICATIONS:    Current Outpatient Medications:     alendronate (FOSAMAX) 35 mg tablet, 35 mg once a week , Disp: , Rfl:     apixaban (ELIQUIS) 5 mg, Take 5 mg by mouth 2 (two) times a day, Disp: , Rfl:     diltiazem (TIAZAC) 180 MG 24 hr capsule, Take 360 mg by mouth Daily , Disp: , Rfl:     diphenhydrAMINE-zinc acetate (BENADRYL) cream, Apply topically 3 (three) times a day as needed for itching, Disp: 15 g, Rfl: 0    furosemide (LASIX) 20 mg tablet, Take 1 tablet (20 mg total) by mouth daily (Patient taking differently: Take 10 mg by mouth daily), Disp: 30 tablet, Rfl: 0    Glyxambi 25-5 MG TABS, Take 1 tablet by mouth daily, Disp: , Rfl:     Lantus SoloStar 100 units/mL SOPN, Inject 20 Units under the skin daily, Disp: , Rfl:     levothyroxine 125 mcg tablet, , Disp: , Rfl:     metoprolol succinate (TOPROL-XL) 100 mg 24 hr tablet, Take 1 tablet by mouth 2 (two) times a day, Disp: , Rfl:      "montelukast (SINGULAIR) 10 mg tablet, Take 10 mg by mouth daily at bedtime, Disp: , Rfl:     Multiple Vitamins-Minerals (CENTRUM SILVER) tablet, Take 1 tablet by mouth daily, Disp: , Rfl:     pantoprazole (PROTONIX) 40 mg tablet, Take 1 tablet (40 mg total) by mouth daily in the early morning, Disp: 30 tablet, Rfl: 0    rosuvastatin (CRESTOR) 5 mg tablet, Take 5 mg by mouth daily, Disp: , Rfl:     Vitamin D, Ergocalciferol, 2000 units CAPS, Take 2,000 capsules by mouth every other day Patient takes 2,000 units every other day, Disp: , Rfl:     Empagliflozin (JARDIANCE PO), Take 25 mg by mouth in the morning (Patient not taking: Reported on 4/4/2024), Disp: , Rfl:     simvastatin (ZOCOR) 40 mg tablet, simvastatin 40 mg tablet  TAKE 1 TABLET BY MOUTH EVERY DAY (Patient not taking: Reported on 4/4/2024), Disp: , Rfl:     PHYSICAL EXAM:  Vitals:    04/15/25 1433   BP: 110/70   BP Location: Right arm   Patient Position: Sitting   Cuff Size: Standard   Pulse: 61   Resp: 16   Temp: (!) 96.9 °F (36.1 °C)   TempSrc: Temporal   SpO2: 99%   Weight: 77.1 kg (170 lb)   Height: 5' 4\" (1.626 m)     Body mass index is 29.18 kg/m².    Physical Exam  Constitutional:       General: She is not in acute distress.     Appearance: She is well-developed.   HENT:      Head: Normocephalic.      Mouth/Throat:      Mouth: Mucous membranes are moist.   Eyes:      General: No scleral icterus.     Conjunctiva/sclera: Conjunctivae normal.   Neck:      Vascular: No JVD.   Cardiovascular:      Rate and Rhythm: Normal rate.      Heart sounds: Normal heart sounds.   Pulmonary:      Effort: Pulmonary effort is normal.      Breath sounds: No wheezing.   Abdominal:      Palpations: Abdomen is soft.      Tenderness: There is no abdominal tenderness.   Musculoskeletal:         General: Normal range of motion.      Cervical back: Neck supple.   Skin:     General: Skin is warm.      Findings: No rash.   Neurological:      Mental Status: She is alert and " "oriented to person, place, and time.   Psychiatric:         Behavior: Behavior normal.         LAB RESULTS:  Results for orders placed or performed in visit on 03/31/25   PTH, intact   Result Value Ref Range    PTH, Intact 47.2 12.0 - 88.0 pg/mL         Portions of the record may have been created with voice recognition software. Occasional wrong word or \"sound a like\" substitutions may have occurred due to the inherent limitations of voice recognition software. Read the chart carefully and recognize, using context, where substitutions have occurred. If you have any questions, please contact the dictating provider.   "

## 2025-04-15 NOTE — ASSESSMENT & PLAN NOTE
Lab Results   Component Value Date    EGFR 42 (L) 03/31/2025    EGFR 41 (L) 01/29/2025    EGFR 64 11/18/2024    CREATININE 1.3 (H) 03/31/2025    CREATININE 1.32 (H) 01/29/2025    CREATININE 0.87 11/18/2024   PTH and phosphorus along with vitamin D are within acceptable range and will continue to monitor

## 2025-04-15 NOTE — ASSESSMENT & PLAN NOTE
Lab Results   Component Value Date    EGFR 42 (L) 03/31/2025    EGFR 41 (L) 01/29/2025    EGFR 64 11/18/2024    CREATININE 1.3 (H) 03/31/2025    CREATININE 1.32 (H) 01/29/2025    CREATININE 0.87 11/18/2024   Renal function is quite stable.  Patient was hospitalized with sepsis but overall seems to be stable for now    Advise hydration